# Patient Record
Sex: MALE | Race: BLACK OR AFRICAN AMERICAN | Employment: UNEMPLOYED | URBAN - METROPOLITAN AREA
[De-identification: names, ages, dates, MRNs, and addresses within clinical notes are randomized per-mention and may not be internally consistent; named-entity substitution may affect disease eponyms.]

---

## 2017-01-10 ENCOUNTER — OFFICE VISIT (OUTPATIENT)
Dept: ORTHOPEDIC SURGERY | Age: 47
End: 2017-01-10

## 2017-01-10 VITALS
HEART RATE: 75 BPM | HEIGHT: 69 IN | DIASTOLIC BLOOD PRESSURE: 95 MMHG | TEMPERATURE: 98.2 F | OXYGEN SATURATION: 94 % | WEIGHT: 223 LBS | SYSTOLIC BLOOD PRESSURE: 147 MMHG | BODY MASS INDEX: 33.03 KG/M2 | RESPIRATION RATE: 16 BRPM

## 2017-01-10 DIAGNOSIS — M25.669 DECREASED RANGE OF MOTION (ROM) OF KNEE: ICD-10-CM

## 2017-01-10 DIAGNOSIS — M17.32 POST-TRAUMATIC OSTEOARTHRITIS OF LEFT KNEE: Primary | ICD-10-CM

## 2017-01-10 DIAGNOSIS — S83.411A TEAR OF MCL (MEDIAL COLLATERAL LIGAMENT) OF KNEE, RIGHT, INITIAL ENCOUNTER: ICD-10-CM

## 2017-01-10 RX ORDER — LIDOCAINE 50 MG/G
OINTMENT TOPICAL
COMMUNITY
End: 2017-03-08

## 2017-01-10 RX ORDER — METFORMIN HYDROCHLORIDE 500 MG/1
500 TABLET ORAL
COMMUNITY
Start: 2016-10-12 | End: 2017-03-08

## 2017-01-10 RX ORDER — LISINOPRIL 20 MG/1
TABLET ORAL
Refills: 10 | COMMUNITY
Start: 2016-12-28 | End: 2018-07-09 | Stop reason: SDUPTHER

## 2017-01-10 RX ORDER — METFORMIN HYDROCHLORIDE EXTENDED-RELEASE TABLETS 1000 MG/1
TABLET, FILM COATED, EXTENDED RELEASE ORAL
Refills: 4 | COMMUNITY
Start: 2016-12-27 | End: 2017-03-08

## 2017-01-10 RX ORDER — ASPIRIN 325 MG
325 TABLET ORAL
COMMUNITY
End: 2017-03-08

## 2017-01-10 RX ORDER — FLUTICASONE PROPIONATE AND SALMETEROL 500; 50 UG/1; UG/1
1 POWDER RESPIRATORY (INHALATION)
COMMUNITY
End: 2017-03-08

## 2017-01-10 RX ORDER — NITROGLYCERIN 0.4 MG/1
0.4 TABLET SUBLINGUAL
COMMUNITY
End: 2017-03-08

## 2017-01-10 RX ORDER — OXYCODONE AND ACETAMINOPHEN 5; 325 MG/1; MG/1
1 TABLET ORAL
Qty: 90 TAB | Refills: 0 | Status: SHIPPED | OUTPATIENT
Start: 2017-01-10 | End: 2017-02-14 | Stop reason: SDUPTHER

## 2017-01-10 RX ORDER — LISINOPRIL 5 MG/1
5 TABLET ORAL
COMMUNITY
Start: 2014-10-22 | End: 2017-03-08

## 2017-01-10 RX ORDER — DULOXETIN HYDROCHLORIDE 30 MG/1
CAPSULE, DELAYED RELEASE ORAL
Refills: 0 | COMMUNITY
Start: 2016-12-27 | End: 2017-07-25 | Stop reason: SDUPTHER

## 2017-01-10 RX ORDER — METOPROLOL SUCCINATE 25 MG/1
25 TABLET, EXTENDED RELEASE ORAL
COMMUNITY
Start: 2016-10-12 | End: 2017-03-08

## 2017-01-10 RX ORDER — PRAVASTATIN SODIUM 20 MG/1
20 TABLET ORAL
COMMUNITY
End: 2017-03-08

## 2017-01-10 RX ORDER — LORATADINE AND PSEUDOEPHEDRINE 10; 240 MG/1; MG/1
TABLET, EXTENDED RELEASE ORAL
COMMUNITY
End: 2018-10-25

## 2017-01-10 RX ORDER — ALBUTEROL SULFATE 90 UG/1
1-2 AEROSOL, METERED RESPIRATORY (INHALATION)
COMMUNITY
Start: 2016-10-12 | End: 2018-10-25 | Stop reason: SDUPTHER

## 2017-01-10 RX ORDER — OMEPRAZOLE 20 MG/1
20 CAPSULE, DELAYED RELEASE ORAL
COMMUNITY
End: 2017-03-08

## 2017-01-10 RX ORDER — ASPIRIN 325 MG
500 TABLET, DELAYED RELEASE (ENTERIC COATED) ORAL
COMMUNITY
End: 2017-03-08

## 2017-01-10 RX ORDER — NAPROXEN 500 MG/1
500 TABLET ORAL
COMMUNITY
Start: 2016-12-03 | End: 2017-03-08

## 2017-01-10 RX ORDER — OMEPRAZOLE 40 MG/1
CAPSULE, DELAYED RELEASE ORAL
Refills: 10 | COMMUNITY
Start: 2016-12-28 | End: 2018-11-23 | Stop reason: SDUPTHER

## 2017-01-10 RX ORDER — MONTELUKAST SODIUM 10 MG/1
10 TABLET ORAL
COMMUNITY
End: 2017-03-08

## 2017-01-10 RX ORDER — LIDOCAINE 50 MG/G
OINTMENT TOPICAL
Refills: 3 | COMMUNITY
Start: 2016-12-27 | End: 2020-10-06

## 2017-01-10 NOTE — PROGRESS NOTES
.1. Have you been to the ER, urgent care clinic since your last visit? Hospitalized since your last visit? No    2. Have you seen or consulted any other health care providers outside of the [address] [place] [address] since your last visit? Include any pap smears or colon screening.  No

## 2017-01-10 NOTE — PROGRESS NOTES
HISTORY OF PRESENT ILLNESS    Stephanie Amador is a 51y.o. year old male comes in today to be evaluated and treated for: follow up knee pain    Since last appt has been having mechanical Sx right knee and to PT once as he is waiting on evening appt but is doing HEP on own at home. Injection right knee didn't seem to help as he still has pain that side. Was having issues with ROM after surgery prior but still having issues as no consistent PT. Social History     Social History    Marital status: SINGLE     Spouse name: N/A    Number of children: N/A    Years of education: N/A     Social History Main Topics    Smoking status: Former Smoker     Packs/day: 0.25    Smokeless tobacco: None    Alcohol use No      Comment: \"a little\"    Drug use: No    Sexual activity: Yes     Partners: Female     Other Topics Concern    None     Social History Narrative     Current Outpatient Prescriptions   Medication Sig Dispense Refill    lidocaine (XYLOCAINE) 5 % ointment APPLY THIN FILM RUB EXTERNALLY TWICE DAILY IN THE MORNING AND EVENING  3    lisinopril (PRINIVIL, ZESTRIL) 20 mg tablet TAKE 1 TABLET BY MOUTH EVERY DAY AS DIRECTED  10    albuterol (PROVENTIL HFA, VENTOLIN HFA, PROAIR HFA) 90 mcg/actuation inhaler 1-2 Puffs.  naproxen (NAPROSYN) 500 mg tablet 500 mg.  pravastatin (PRAVACHOL) 20 mg tablet 20 mg.      tiotropium (SPIRIVA) 18 mcg inhalation capsule 18 mcg.  omega 3-dha-epa-fish oil (FISH OIL) 60- mg cap Take 500 mg by mouth daily.  tiotropium (SPIRIVA) 18 mcg inhalation capsule Take 1 Cap by inhalation daily.  cyclobenzaprine (FLEXERIL) 10 mg tablet Cyclobenzaprine HCl - 10 MG Oral Tablet  take 1 tablet by mouth every 8 hours if needed for UP TO 5 DAYS   Quantity: 12; Refills: 0     Started 22-Nov-2016  Active      ASPIRIN PO Take 81 mg by mouth.  metoprolol succinate (TOPROL-XL) 25 mg XL tablet Take 25 mg by mouth daily.       metFORMIN (GLUCOPHAGE) 1,000 mg tablet Take 1,000 mg by mouth two (2) times a day.  omeprazole (PRILOSEC) 20 mg capsule Take 20 mg by mouth daily.  montelukast (SINGULAIR) 10 mg tablet Take 10 mg by mouth daily.  loratadine (CLARITIN) 10 mg tablet Take 10 mg by mouth daily.  fluticasone-salmeterol (ADVAIR DISKUS) 500-50 mcg/dose diskus inhaler Take 1 Puff by inhalation every twelve (12) hours.  albuterol (VENTOLIN HFA) 90 mcg/actuation inhaler Take 1 Puff by inhalation every six (6) hours as needed.  nitroglycerin (NITROSTAT) 0.4 mg SL tablet 0.4 mg by SubLINGual route every five (5) minutes as needed for Chest Pain.  DULoxetine (CYMBALTA) 30 mg capsule TAKE 1 CAPSULE BY MOUTH EVERY MORNING  0    metFORMIN ER 1,000 mg tr24 TAKE 1 TABLET BY MOUTH 2 TIMES A DAY  4    omeprazole (PRILOSEC) 40 mg capsule TAKE 1 CAPSULE BY MOUTH 2 TIMES A DAY  10    aspirin (ASPIRIN) 325 mg tablet 325 mg.      omega 3-dha-epa-fish oil (FISH OIL) 60- mg cap 500 mg.      fluticasone-salmeterol (ADVAIR) 500-50 mcg/dose diskus inhaler 1 Puff.  loratadine-pseudoephedrine (CLARITIN-D 24-HOUR)  mg per tablet Take 1 Tab by Mouth Once a Day.  lidocaine (XYLOCAINE) 5 % ointment Use 1 Application to affected area Take As Needed.  lisinopril (PRINIVIL, ZESTRIL) 5 mg tablet 5 mg.  metFORMIN (GLUCOPHAGE) 500 mg tablet 500 mg.      metoprolol succinate (TOPROL-XL) 25 mg XL tablet 25 mg.      montelukast (SINGULAIR) 10 mg tablet 10 mg.      nitroglycerin (NITROSTAT) 0.4 mg SL tablet 0.4 mg.      omeprazole (PRILOSEC) 20 mg capsule 20 mg.      oxyCODONE-acetaminophen (PERCOCET) 5-325 mg per tablet Take 1 Tab by mouth every eight (8) hours as needed for Pain. Max Daily Amount: 3 Tabs. 90 Tab 0    pravastatin (PRAVACHOL) 20 mg tablet Take 20 mg by mouth nightly.       docusate sodium (COLACE) 100 mg capsule Take one po daily prn constipation 30 Cap 3    lisinopril (PRINIVIL, ZESTRIL) 10 mg tablet Take 20 mg by mouth daily. Past Medical History   Diagnosis Date    Arthritis      left knee    Asthma     CAD (coronary artery disease)      heart attack 6/2012, Being followed by Dr. Greyson Crenshaw Depression     Diabetes ([facility] [address]     GERD (gastroesophageal reflux disease)     Hypertension     Psychiatric disorder      depression and schziophenia    Schizophrenia ([facility] [address]      Will be seeing [facility]    Unspecified sleep apnea      cpap machine,Will be seeing neurologist     Family History   Problem Relation Age of Onset    Anemia Mother     Cancer Father      prostate    Cancer Brother     Diabetes Brother      ROS:  Some swell, bruise, no numb, tingle, swell    Objective:  Visit Vitals    BP (!) 147/95 (BP 1 Location: Left arm, BP Patient Position: Sitting)    Pulse 75    Temp 98.2 °F (36.8 °C) (Oral)    Resp 16    Ht 5' 9\" (1.753 m)    Wt 223 lb (101.2 kg)    SpO2 94%    BMI 32.93 kg/m2     GEN: Appears stated age in NAD. HEAD: Normocephalic, atraumatic. NEURO: Sensation intact light touch B/L lower extremities. MS: LE Strength +5/5 bilateral .   right Knee:  2+ Effusion . Lachman negative. Valgus positive B/L. Varus negative. positive joint line tenderness medial, posterior. Ashok positive. Posterior drawer positive. Noble compression test negative. Patellar apprehension negative. Patellar facet positive tender to palpation medial no crepitance right. Loss ROM left knee to 70 degrees but I can get to 90 degrees with gentle pressure. EXT: no clubbing/cyanosis. no edema. SKIN: Warm/dry without rash. Assessment/Plan:     ICD-10-CM ICD-9-CM    1. Post-traumatic osteoarthritis of left knee M17.32 715.26 oxyCODONE-acetaminophen (PERCOCET) 5-325 mg per tablet   2. Tear of MCL (medial collateral ligament) of knee, right, initial encounter S83.411A 844.1 oxyCODONE-acetaminophen (PERCOCET) 5-325 mg per tablet   3.  Decreased range of motion (ROM) of knee M25.669 719.56        Patient verbalizes understanding of evaluation and plan. Will await MRI and refill percocet for pain control and RTC after MRI for results. Continue PT.  free of abhorrences.

## 2017-01-10 NOTE — PATIENT INSTRUCTIONS
Magnetic Resonance Imaging (MRI): About This Test  What is it? Magnetic resonance imaging (MRI) is a test that uses a magnetic field and pulses of radio wave energy to make pictures of organs and structures inside the body. When you have an MRI, you lie on a table and your body is moved into the MRI machine, where an image is taken of the area of the body being studied. Why is this test done? You may have an MRI for many reasons. This test can find problems such as tumors, bleeding, injury, blood vessel disease, and infection. An MRI also may provide more information about a problem seen on an X-ray, ultrasound scan, CT scan, or nuclear medicine exam.  How can you prepare for the test?  Talk to your doctor about all your health conditions before the test. For example, tell your doctor if:  · You are allergic to any medicines. · You are or might be pregnant. · You have a pacemaker, an artificial limb, any metal pins or metal parts in your body, metal heart valves, metal clips in your brain, metal implants in your ears, or any other implanted or prosthetic medical device. · You have an intrauterine device (IUD) in place. · You get nervous in confined spaces. You may need medicine to help you relax. · You wear any patches that contain medicine. · You have kidney disease. What happens before the test?  · You will remove all metal objects from your body. These include hearing aids, dentures, jewelry, watches, and hairpins. · You will take off all or most of your clothes and then change into a gown. · If you do leave some clothes on, make sure you take everything out of your pockets. · You may have contrast materials (dye) put into your arm through a tube called an IV. Contrast material helps doctors see specific organs, blood vessels, and most tumors. What happens during the test?  · You will lie on your back on a table that is part of the MRI scanner.   · The table will slide into the space that contains the magnet. · Inside the scanner you will hear a fan and feel air moving. You may hear tapping, thumping, or snapping noises. You may be given earplugs or headphones to reduce the noise. · You will be asked to hold still during the scan. You may be asked to hold your breath for short periods. · You may be alone in the scanning room, but a technologist will be watching you through a window and talking with you during the test.  What else should you know about the test?  · An MRI does not hurt. · If a dye is used, you may feel a quick sting or pinch and some coolness when the IV is started. The dye may give you a metallic taste in your mouth. Some people feel sick to their stomach or get a headache. · If you breastfeed and are concerned about whether the dye used in this test is safe, talk to your doctor. Most experts believe that very little dye passes into breast milk and even less is passed on to the baby. But if you prefer, you can store some of your breast milk ahead of time and use it for a day or two after the test.  · You may feel warmth in the area being examined. This is normal.  How long does the test take? · The test usually takes 30 to 60 minutes but can take as long as 2 hours. What happens after the test?  · You will probably be able to go home right away, depending on the reason for the test.  Follow-up care is a key part of your treatment and safety. Be sure to make and go to all appointments, and call your doctor if you are having problems. It's also a good idea to keep a list of the medicines you take. Ask your doctor when you can expect to have your test results. Where can you learn more? Go to http://carlos a-fernando.info/. Enter V016 in the search box to learn more about \"Magnetic Resonance Imaging (MRI): About This Test.\"  Current as of: February 19, 2016  Content Version: 11.1  © 2088-4690 [email or web address].  Care instructions adapted under license by [employer] (which disclaims liability or warranty for this information). If you have questions about a medical condition or this instruction, always ask your healthcare professional. [address] any warranty or liability for your use of this information.

## 2017-01-10 NOTE — MR AVS SNAPSHOT
Visit Information Date & Time Provider Department Dept. Phone Encounter #  
 1/10/2017  9:20 AM Triston Roach, [address] [facility] - [phone] [id number] Upcoming Health Maintenance Date Due  
 LIPID PANEL Q1 1970 FOOT EXAM Q1 2/5/1979 MICROALBUMIN Q1 2/5/1979 EYE EXAM RETINAL OR DILATED Q1 2/5/1979 HEMOGLOBIN A1C Q6M 2/5/2015 DTaP/Tdap/Td series (2 - Td) 12/12/2026 Allergies as of 1/10/2017  Review Complete On: 1/10/2017 By: Triston Roach DO Severity Noted Reaction Type Reactions Norco [Hydrocodone-acetaminophen] High 12/12/2016    Nausea and Vomiting Gi distress Shellfish Derived High 07/31/2014    Swelling, Angioedema Other reaction(s): anaphylaxis/angioedema [address] AND [facility] Current Immunizations  Never Reviewed Name Date Influenza Vaccine 10/27/2014, 9/1/2014 Pneumococcal Polysaccharide (PPSV-23) 11/4/2014 12:17 PM, 10/31/2013 Not reviewed this visit You Were Diagnosed With   
  
 Codes Comments Post-traumatic osteoarthritis of left knee    -  Primary ICD-10-CM: M17.32 
ICD-9-CM: 715.26 Tear of MCL (medial collateral ligament) of knee, right, initial encounter     ICD-10-CM: B32.058Q ICD-9-CM: 844.1 Decreased range of motion (ROM) of knee     ICD-10-CM: K67.767 ICD-9-CM: 719.56 Vitals BP Pulse Temp Resp Height(growth percentile) Weight(growth percentile) (!) 147/95 (BP 1 Location: Left arm, BP Patient Position: Sitting) 75 98.2 °F (36.8 °C) (Oral) 16 5' 9\" (1.753 m) 223 lb (101.2 kg) SpO2 BMI Smoking Status 94% 32.93 kg/m2 Former Smoker Vitals History BMI and BSA Data Body Mass Index Body Surface Area  
 32.93 kg/m 2 2.22 m 2 Preferred Pharmacy Pharmacy Name Phone [facility] [address] Your Updated Medication List  
  
   
 This list is accurate as of: 1/10/17 10:05 AM.  Always use your most recent med list.  
  
  
  
  
 * ADVAIR DISKUS 500-50 mcg/dose diskus inhaler Generic drug:  fluticasone-salmeterol Take 1 Puff by inhalation every twelve (12) hours. * fluticasone-salmeterol 500-50 mcg/dose diskus inhaler Commonly known as:  ADVAIR  
1 Puff. * ASPIRIN PO Take 81 mg by mouth. * aspirin 325 mg tablet Commonly known as:  ASPIRIN  
325 mg.  
  
 cyclobenzaprine 10 mg tablet Commonly known as:  FLEXERIL Cyclobenzaprine HCl - 10 MG Oral Tablet take 1 tablet by mouth every 8 hours if needed for UP TO 5 DAYS  Quantity: 12; Refills: 0   Started 22-Nov-2016 Active  
  
 docusate sodium 100 mg capsule Commonly known as:  Vikash Barefoot Take one po daily prn constipation DULoxetine 30 mg capsule Commonly known as:  CYMBALTA TAKE 1 CAPSULE BY MOUTH EVERY MORNING  
  
 * FISH OIL 60- mg Cap Generic drug:  omega 3-dha-epa-fish oil Take 500 mg by mouth daily. * FISH OIL 60- mg Cap Generic drug:  omega 3-dha-epa-fish oil 500 mg.  
  
 * lidocaine 5 % ointment Commonly known as:  XYLOCAINE Use 1 Application to affected area Take As Needed. * lidocaine 5 % ointment Commonly known as:  XYLOCAINE  
APPLY THIN FILM RUB EXTERNALLY TWICE DAILY IN THE MORNING AND EVENING  
  
 * lisinopril 10 mg tablet Commonly known as:  Leticia Primmer Take 20 mg by mouth daily. * lisinopril 5 mg tablet Commonly known as:  PRINIVIL, ZESTRIL  
5 mg.  
  
 * lisinopril 20 mg tablet Commonly known as:  PRINIVIL, ZESTRIL  
TAKE 1 TABLET BY MOUTH EVERY DAY AS DIRECTED  
  
 loratadine 10 mg tablet Commonly known as:  Shira Nay Take 10 mg by mouth daily. loratadine-pseudoephedrine  mg per tablet Commonly known as:  CLARITIN-D 24-hour Take 1 Tab by Mouth Once a Day. * metFORMIN 1,000 mg tablet Commonly known as:  GLUCOPHAGE  
 Take 1,000 mg by mouth two (2) times a day. * GLUCOPHAGE 500 mg tablet Generic drug:  metFORMIN  
500 mg.  
  
 * metFORMIN ER 1,000 mg Tr24 TAKE 1 TABLET BY MOUTH 2 TIMES A DAY  
  
 * metoprolol succinate 25 mg XL tablet Commonly known as:  TOPROL-XL Take 25 mg by mouth daily. * metoprolol succinate 25 mg XL tablet Commonly known as:  TOPROL-XL 25 mg.  
  
 * montelukast 10 mg tablet Commonly known as:  SINGULAIR Take 10 mg by mouth daily. * montelukast 10 mg tablet Commonly known as:  SINGULAIR  
10 mg. NAPROSYN 500 mg tablet Generic drug:  naproxen 500 mg.  
  
 * NITROSTAT 0.4 mg SL tablet Generic drug:  nitroglycerin 0.4 mg by SubLINGual route every five (5) minutes as needed for Chest Pain. * nitroglycerin 0.4 mg SL tablet Commonly known as:  NITROSTAT  
0.4 mg.  
  
 * omeprazole 20 mg capsule Commonly known as:  PRILOSEC Take 20 mg by mouth daily. * omeprazole 20 mg capsule Commonly known as:  PRILOSEC  
20 mg.  
  
 * omeprazole 40 mg capsule Commonly known as:  PRILOSEC  
TAKE 1 CAPSULE BY MOUTH 2 TIMES A DAY  
  
 oxyCODONE-acetaminophen 5-325 mg per tablet Commonly known as:  PERCOCET Take 1 Tab by mouth every eight (8) hours as needed for Pain. Max Daily Amount: 3 Tabs. * pravastatin 20 mg tablet Commonly known as:  PRAVACHOL Take 20 mg by mouth nightly. * pravastatin 20 mg tablet Commonly known as:  PRAVACHOL  
20 mg.  
  
 * tiotropium 18 mcg inhalation capsule Commonly known as:  Karen Standing Take 1 Cap by inhalation daily. * tiotropium 18 mcg inhalation capsule Commonly known as:  Karen Standing 18 mcg. * VENTOLIN HFA 90 mcg/actuation inhaler Generic drug:  albuterol Take 1 Puff by inhalation every six (6) hours as needed. * albuterol 90 mcg/actuation inhaler Commonly known as:  PROVENTIL HFA, VENTOLIN HFA, PROAIR HFA  
1-2 Puffs. * Notice: This list has 29 medication(s) that are the same as other medications prescribed for you. Read the directions carefully, and ask your doctor or other care provider to review them with you. Prescriptions Printed Refills  
 oxyCODONE-acetaminophen (PERCOCET) 5-325 mg per tablet 0 Sig: Take 1 Tab by mouth every eight (8) hours as needed for Pain. Max Daily Amount: 3 Tabs. Class: Print Route: Oral  
  
Patient Instructions Magnetic Resonance Imaging (MRI): About This Test 
What is it? Magnetic resonance imaging (MRI) is a test that uses a magnetic field and pulses of radio wave energy to make pictures of organs and structures inside the body. When you have an MRI, you lie on a table and your body is moved into the MRI machine, where an image is taken of the area of the body being studied. Why is this test done? You may have an MRI for many reasons. This test can find problems such as tumors, bleeding, injury, blood vessel disease, and infection. An MRI also may provide more information about a problem seen on an X-ray, ultrasound scan, CT scan, or nuclear medicine exam. 
How can you prepare for the test? 
Talk to your doctor about all your health conditions before the test. For example, tell your doctor if: 
· You are allergic to any medicines. · You are or might be pregnant. · You have a pacemaker, an artificial limb, any metal pins or metal parts in your body, metal heart valves, metal clips in your brain, metal implants in your ears, or any other implanted or prosthetic medical device. · You have an intrauterine device (IUD) in place. · You get nervous in confined spaces. You may need medicine to help you relax. · You wear any patches that contain medicine. · You have kidney disease. What happens before the test? 
· You will remove all metal objects from your body. These include hearing aids, dentures, jewelry, watches, and hairpins. · You will take off all or most of your clothes and then change into a gown. · If you do leave some clothes on, make sure you take everything out of your pockets. · You may have contrast materials (dye) put into your arm through a tube called an IV. Contrast material helps doctors see specific organs, blood vessels, and most tumors. What happens during the test? 
· You will lie on your back on a table that is part of the MRI scanner. · The table will slide into the space that contains the magnet. · Inside the scanner you will hear a fan and feel air moving. You may hear tapping, thumping, or snapping noises. You may be given earplugs or headphones to reduce the noise. · You will be asked to hold still during the scan. You may be asked to hold your breath for short periods. · You may be alone in the scanning room, but a technologist will be watching you through a window and talking with you during the test. 
What else should you know about the test? 
· An MRI does not hurt. · If a dye is used, you may feel a quick sting or pinch and some coolness when the IV is started. The dye may give you a metallic taste in your mouth. Some people feel sick to their stomach or get a headache. · If you breastfeed and are concerned about whether the dye used in this test is safe, talk to your doctor. Most experts believe that very little dye passes into breast milk and even less is passed on to the baby. But if you prefer, you can store some of your breast milk ahead of time and use it for a day or two after the test. 
· You may feel warmth in the area being examined. This is normal. 
How long does the test take? · The test usually takes 30 to 60 minutes but can take as long as 2 hours. What happens after the test? 
· You will probably be able to go home right away, depending on the reason for the test. 
Follow-up care is a key part of your treatment and safety.  Be sure to make and go to all appointments, and call your doctor if you are having problems. It's also a good idea to keep a list of the medicines you take. Ask your doctor when you can expect to have your test results. Where can you learn more? Go to http://carlos a-fernando.info/. Enter V016 in the search box to learn more about \"Magnetic Resonance Imaging (MRI): About This Test.\" Current as of: February 19, 2016 Content Version: 11.1 © 8824-5198 [employer]. Care instructions adapted under license by [employer] (which disclaims liability or warranty for this information). If you have questions about a medical condition or this instruction, always ask your healthcare professional. [address] any warranty or liability for your use of this information. Introducing [facility]! Erica Andrade introduces [employer] patient portal. Now you can access parts of your medical record, email your doctor's office, and request medication refills online. 1. In your internet browser, go to [email or web address] 2. Click on the First Time User? Click Here link in the Sign In box. You will see the New Member Sign Up page. 3. Enter your [employer] Access Code exactly as it appears below. You will not need to use this code after youve completed the sign-up process. If you do not sign up before the expiration date, you must request a new code. · [employer] Access Code: [email or web address] Expires: 3/12/2017  3:38 PM 
 
4. Enter the last four digits of your Social Security Number (xxxx) and Date of Birth (mm/dd/yyyy) as indicated and click Submit. You will be taken to the next sign-up page. 5. Create a thinkingphonest ID. This will be your [employer] login ID and cannot be changed, so think of one that is secure and easy to remember. 6. Create a thinkingphonest password. You can change your password at any time. 7. Enter your Password Reset Question and Answer. This can be used at a later time if you forget your password. 8. Enter your e-mail address. You will receive e-mail notification when new information is available in [address] 9. Click Sign Up. You can now view and download portions of your medical record. 10. Click the Download Summary menu link to download a portable copy of your medical information. If you have questions, please visit the Frequently Asked Questions section of the [employer] website. Remember, [employer] is NOT to be used for urgent needs. For medical emergencies, dial 911. Now available from your iPhone and Android! Please provide this summary of care documentation to your next provider. Your primary care clinician is listed as oTny Howard. If you have any questions after today's visit, please call [phone].

## 2017-01-11 ENCOUNTER — TELEPHONE (OUTPATIENT)
Dept: ORTHOPEDIC SURGERY | Age: 47
End: 2017-01-11

## 2017-01-11 NOTE — TELEPHONE ENCOUNTER
Pt called and states that he is in need of Pre-authorization to obtain he recent prescription of Percocet. Please call pt with further information at [phone].

## 2017-01-12 DIAGNOSIS — E78.5 DYSLIPIDEMIA: ICD-10-CM

## 2017-01-12 DIAGNOSIS — I10 BENIGN HYPERTENSION WITHOUT CHF: ICD-10-CM

## 2017-01-12 DIAGNOSIS — Z13.21 ENCOUNTER FOR VITAMIN DEFICIENCY SCREENING: ICD-10-CM

## 2017-01-12 DIAGNOSIS — K59.00 CONSTIPATION, UNSPECIFIED CONSTIPATION TYPE: ICD-10-CM

## 2017-01-12 DIAGNOSIS — E11.9 DIABETES MELLITUS, STABLE (HCC): ICD-10-CM

## 2017-01-12 NOTE — TELEPHONE ENCOUNTER
I have placed the prior authorization for patient's percocet on provider desk to be signed and then faxed to insurance company

## 2017-01-12 NOTE — TELEPHONE ENCOUNTER
Called and left patient a message that with his insurance his percocet will not be approved and per Dr. Arlette Solomon he can then take Aleve or Ibuprofen. If he had any question to call me back.

## 2017-01-23 ENCOUNTER — OFFICE VISIT (OUTPATIENT)
Dept: SURGERY | Age: 47
End: 2017-01-23

## 2017-01-23 VITALS
RESPIRATION RATE: 20 BRPM | DIASTOLIC BLOOD PRESSURE: 80 MMHG | HEIGHT: 69 IN | BODY MASS INDEX: 33.53 KG/M2 | TEMPERATURE: 97.6 F | OXYGEN SATURATION: 95 % | HEART RATE: 76 BPM | SYSTOLIC BLOOD PRESSURE: 150 MMHG | WEIGHT: 226.4 LBS

## 2017-01-23 DIAGNOSIS — R19.01 ABDOMINAL MASS, RIGHT UPPER QUADRANT: Primary | ICD-10-CM

## 2017-01-23 DIAGNOSIS — R10.11 RIGHT UPPER QUADRANT ABDOMINAL PAIN: ICD-10-CM

## 2017-01-23 NOTE — PROGRESS NOTES
General Surgery Consult    Dilma Crespo  Admit date: (Not on file)    MRN: [id number]     : 1970     Age: 51 y.o. Attending Physician: Elizabeth Martinez MD, [facility]      History of Present Illness:      Dilma Crespo is a 51 y.o. male who presented with was referred for the possibility of an incisional hernia. The patient had umbilical hernia about 2-3 years ago. And for the past one to 2 years been having constipation. He also he has been having abdominal pain on the right side of the hernia repair. He was noticed to have a bulge just on the right side of the hernia. He denies any nausea or vomiting. Patient has significant medical history including schizophrenia and DM and it seems history of coronary artery disease. He said that in his previous hernia surgery a mesh was placed.     Patient Active Problem List    Diagnosis Date Noted    [facility]ia ([facility]     Unspecified sleep apnea     Psychiatric disorder     Hypertension     GERD (gastroesophageal reflux disease)     Diabetes ([facility]     Depression     CAD (coronary artery disease)     Asthma     Arthritis     Encounter for long-term (current) use of other medications 2015    Left knee pain 2015    Chronic pain syndrome 2015    History of total knee arthroplasty 2015    S/P surgical manipulation of knee joint 2015    History of anxiety 2015    DJD (degenerative joint disease) of knee 2014     Past Medical History   Diagnosis Date    Arthritis      left knee    Asthma     CAD (coronary artery disease)      heart attack 2012, Being followed by Dr. Tabitha Cedeño Depression     Diabetes ([facility]     GERD (gastroesophageal reflux disease)     Hypertension     Psychiatric disorder      depression and schziophenia    Schizophrenia ([facility]      Will be seeing [employer]    Unspecified sleep apnea      cpap machine,Will be seeing neurologist      Past Surgical History Procedure Laterality Date    Hx orthopaedic       L knee 1987, 1996    Pr abdomen surgery proc unlisted      Hx heart catheterization       cardiac cath     Hx hernia repair      Hx other surgical       eye sx at 2 yrs old    Hx knee replacement       left TKR      Social History   Substance Use Topics    Smoking status: Former Smoker     Packs/day: 0.25    Smokeless tobacco: Not on file    Alcohol use No      Comment: \"a little\"      History   Smoking Status    Former Smoker    Packs/day: 0.25   Smokeless Tobacco    Not on file     Family History   Problem Relation Age of Onset    Anemia Mother     Cancer Father      prostate    Cancer Brother     Diabetes Brother       Current Outpatient Prescriptions   Medication Sig    lisinopril (PRINIVIL, ZESTRIL) 20 mg tablet TAKE 1 TABLET BY MOUTH EVERY DAY AS DIRECTED    metFORMIN ER 1,000 mg tr24 TAKE 1 TABLET BY MOUTH 2 TIMES A DAY    omeprazole (PRILOSEC) 40 mg capsule TAKE 1 CAPSULE BY MOUTH 2 TIMES A DAY    aspirin (ASPIRIN) 325 mg tablet 325 mg.    albuterol (PROVENTIL HFA, VENTOLIN HFA, PROAIR HFA) 90 mcg/actuation inhaler 1-2 Puffs.  naproxen (NAPROSYN) 500 mg tablet 500 mg.    omega 3-dha-epa-fish oil (FISH OIL) 60- mg cap 500 mg.    fluticasone-salmeterol (ADVAIR) 500-50 mcg/dose diskus inhaler 1 Puff.  loratadine-pseudoephedrine (CLARITIN-D 24-HOUR)  mg per tablet Take 1 Tab by Mouth Once a Day.  lisinopril (PRINIVIL, ZESTRIL) 5 mg tablet 5 mg.  metFORMIN (GLUCOPHAGE) 500 mg tablet 500 mg.    metoprolol succinate (TOPROL-XL) 25 mg XL tablet 25 mg.    montelukast (SINGULAIR) 10 mg tablet 10 mg.    nitroglycerin (NITROSTAT) 0.4 mg SL tablet 0.4 mg.    omeprazole (PRILOSEC) 20 mg capsule 20 mg.  pravastatin (PRAVACHOL) 20 mg tablet 20 mg.    tiotropium (SPIRIVA) 18 mcg inhalation capsule 18 mcg.  omega 3-dha-epa-fish oil (FISH OIL) 60- mg cap Take 500 mg by mouth daily.     pravastatin (PRAVACHOL) 20 mg tablet Take 20 mg by mouth nightly.  tiotropium (SPIRIVA) 18 mcg inhalation capsule Take 1 Cap by inhalation daily.  ASPIRIN PO Take 81 mg by mouth.  docusate sodium (COLACE) 100 mg capsule Take one po daily prn constipation    metoprolol succinate (TOPROL-XL) 25 mg XL tablet Take 25 mg by mouth daily.  metFORMIN (GLUCOPHAGE) 1,000 mg tablet Take 1,000 mg by mouth two (2) times a day.  omeprazole (PRILOSEC) 20 mg capsule Take 20 mg by mouth daily.  montelukast (SINGULAIR) 10 mg tablet Take 10 mg by mouth daily.  loratadine (CLARITIN) 10 mg tablet Take 10 mg by mouth daily.  lisinopril (PRINIVIL, ZESTRIL) 10 mg tablet Take 20 mg by mouth daily.  fluticasone-salmeterol (ADVAIR DISKUS) 500-50 mcg/dose diskus inhaler Take 1 Puff by inhalation every twelve (12) hours.  albuterol (VENTOLIN HFA) 90 mcg/actuation inhaler Take 1 Puff by inhalation every six (6) hours as needed.  nitroglycerin (NITROSTAT) 0.4 mg SL tablet 0.4 mg by SubLINGual route every five (5) minutes as needed for Chest Pain.  DULoxetine (CYMBALTA) 30 mg capsule TAKE 1 CAPSULE BY MOUTH EVERY MORNING    lidocaine (XYLOCAINE) 5 % ointment APPLY THIN FILM RUB EXTERNALLY TWICE DAILY IN THE MORNING AND EVENING    lidocaine (XYLOCAINE) 5 % ointment Use 1 Application to affected area Take As Needed.  oxyCODONE-acetaminophen (PERCOCET) 5-325 mg per tablet Take 1 Tab by mouth every eight (8) hours as needed for Pain. Max Daily Amount: 3 Tabs.  cyclobenzaprine (FLEXERIL) 10 mg tablet Cyclobenzaprine HCl - 10 MG Oral Tablet  take 1 tablet by mouth every 8 hours if needed for UP TO 5 DAYS   Quantity: 12; Refills: 0     Started 22-Nov-2016  Active     No current facility-administered medications for this visit.        Allergies   Allergen Reactions    Norco [Hydrocodone-Acetaminophen] Nausea and Vomiting     Gi distress    Shellfish Derived Swelling and Angioedema     Other reaction(s): anaphylaxis/angioedema  [place] AND CRAB        Review of Systems:  Constitutional: negative for fevers and chills  Eyes: negative  Ears, Nose, Mouth, Throat, and Face: negative  Respiratory: negative for cough  Cardiovascular: negative for chest pain  Gastrointestinal: positive for constipation and abdominal pain, negative for nausea and vomiting  Genitourinary:negative  Integument/Breast: negative  Hematologic/Lymphatic: negative  Musculoskeletal:negative  Neurological: negative  Behavioral/Psychiatric: negative  Endocrine: negative  Allergic/Immunologic: negative    Objective:     Visit Vitals    /80 (BP 1 Location: Right arm, BP Patient Position: Sitting)    Pulse 76    Temp 97.6 °F (36.4 °C) (Oral)    Resp 20    Ht 5' 9\" (1.753 m)    Wt 102.7 kg (226 lb 6.4 oz)    SpO2 95%    BMI 33.43 kg/m2       Physical Exam:      General:  in no apparent distress   Eyes:  conjunctivae and sclerae normal, pupils equal, round, reactive to light   Throat & Neck: no erythema or exudates noted and neck supple and symmetrical; no palpable masses   Lungs:   clear to auscultation bilaterally   Heart:  Regular rate and rhythm   Abdomen:   rounded, soft, nontender, nondistended, no organomegaly. A ball which can be noticed just to the right side of the umbilicus that is non tender. I could not appreciate a clear hernia defect though there may be one just lateral to the previous hernia repair.     Extremities: extremities normal, atraumatic, no cyanosis or edema   Skin: Normal.       Imaging and Lab Review:     CBC:   Lab Results   Component Value Date/Time    HGB 8.0 08/07/2014 05:38 AM    HCT 25.3 08/07/2014 05:38 AM     BMP:   Lab Results   Component Value Date/Time    Glucose 100 08/07/2014 05:38 AM    Sodium 140 08/07/2014 05:38 AM    Potassium 4.2 08/07/2014 05:38 AM    Chloride 104 08/07/2014 05:38 AM    CO2 29 08/07/2014 05:38 AM    BUN 10 08/07/2014 05:38 AM    Creatinine 1.27 08/07/2014 05:38 AM    Calcium 8.4 08/07/2014 05:38 AM     CMP:  Lab Results   Component Value Date/Time    Glucose 100 08/07/2014 05:38 AM    Sodium 140 08/07/2014 05:38 AM    Potassium 4.2 08/07/2014 05:38 AM    Chloride 104 08/07/2014 05:38 AM    CO2 29 08/07/2014 05:38 AM    BUN 10 08/07/2014 05:38 AM    Creatinine 1.27 08/07/2014 05:38 AM    Calcium 8.4 08/07/2014 05:38 AM    Anion gap 7 08/07/2014 05:38 AM    BUN/Creatinine ratio 8 08/07/2014 05:38 AM       No results found for this or any previous visit (from the past 24 hour(s)). images and reports reviewed    Assessment:   Brad Buenrostro is a 51 y.o. male is presenting with abdominal pain and a picture of possible recurrence abdomen for hernia. The patient has pain and bulge on the right side of the abdominal wall. Given the fact that I could not for sure establish a diagnosis of a ventral hernia and giving the fact that the patient has multiple medical co morbidities, I would like to get a CT scan for confirmation of the presence of a hernia.     Plan:     Get a CT scan of the abdomen and pelvis to rule out abdominal wall hernia  If positive I would bring the patient back to discuss hernia surgery     Please call me if you have any questions (cell phone: [phone])     Signed By: Varun Gordon MD     January 23, 2017

## 2017-01-23 NOTE — PATIENT INSTRUCTIONS
If you have any questions or concerns about today's appointment, the verbal and/or written instructions you were given for follow up care, please call our office at [phone]. [facility] - [address]    [phone] office  [phone]. bss

## 2017-01-23 NOTE — PROGRESS NOTES
Patient is a 51 y.o. male who presents today for an evaluation of a ventral hernia, which was discovered approximately three weeks ago by his PCP upon physical exam. Patient denies having any abdominal pain or N/V.

## 2017-01-23 NOTE — LETTER
1/23/2017 8:41 AM 
 
Patient:  Obed Lewis YOB: 1970 Date of Visit: 1/23/2017 Jose Nath MD 
[address] [place] [address] VIA In Basket Dear Jose Nath MD, Thank you for referring [facility]. Ariana [facility] and treatment. Below are the relevant portions of my assessment and plan of care. Thank you very much for your referral of Mr. Ariana Turner. If you have questions, please do not hesitate to call me. I look forward to following Mr. Rah Loredo along with you and will keep you updated as to his progress. Sincerely, Johnny Sandhu MD

## 2017-01-23 NOTE — MR AVS SNAPSHOT
Visit Information Date & Time Provider Department Dept. Phone Encounter #  
 1/23/2017  8:00 AM Johnny Sandhu MD [facility] [address] [facility] [phone] [id number] Upcoming Health Maintenance Date Due  
 LIPID PANEL Q1 1970 FOOT EXAM Q1 2/5/1979 MICROALBUMIN Q1 2/5/1979 EYE EXAM RETINAL OR DILATED Q1 2/5/1979 HEMOGLOBIN A1C Q6M 2/5/2015 DTaP/Tdap/Td series (2 - Td) 12/12/2026 Allergies as of 1/23/2017  Review Complete On: 1/23/2017 By: Ara Tejada Severity Noted Reaction Type Reactions Norco [Hydrocodone-acetaminophen] High 12/12/2016    Nausea and Vomiting Gi distress Shellfish Derived High 07/31/2014    Swelling, Angioedema Other reaction(s): anaphylaxis/angioedema [address] AND CRAB Current Immunizations  Never Reviewed Name Date Influenza Vaccine 10/27/2014, 9/1/2014 Pneumococcal Polysaccharide (PPSV-23) 11/4/2014 12:17 PM, 10/31/2013 Not reviewed this visit Vitals BP Pulse Temp Resp Height(growth percentile) Weight(growth percentile) 150/80 (BP 1 Location: Right arm, BP Patient Position: Sitting) 76 97.6 °F (36.4 °C) (Oral) 20 5' 9\" (1.753 m) 226 lb 6.4 oz (102.7 kg) SpO2 BMI Smoking Status 95% 33.43 kg/m2 Former Smoker BMI and BSA Data Body Mass Index Body Surface Area  
 33.43 kg/m 2 2.24 m 2 Preferred Pharmacy Pharmacy Name Phone [address] Your Updated Medication List  
  
   
This list is accurate as of: 1/23/17  8:54 AM.  Always use your most recent med list.  
  
  
  
  
 * ADVAIR DISKUS 500-50 mcg/dose diskus inhaler Generic drug:  fluticasone-salmeterol Take 1 Puff by inhalation every twelve (12) hours. * fluticasone-salmeterol 500-50 mcg/dose diskus inhaler Commonly known as:  ADVAIR  
1 Puff. * ASPIRIN PO Take 81 mg by mouth. * aspirin 325 mg tablet Commonly known as:  ASPIRIN  
325 mg.  
  
 cyclobenzaprine 10 mg tablet Commonly known as:  FLEXERIL Cyclobenzaprine HCl - 10 MG Oral Tablet take 1 tablet by mouth every 8 hours if needed for UP TO 5 DAYS  Quantity: 12; Refills: 0   Started 22-Nov-2016 Active  
  
 docusate sodium 100 mg capsule Commonly known as:  Ltanya Harshad Take one po daily prn constipation DULoxetine 30 mg capsule Commonly known as:  CYMBALTA TAKE 1 CAPSULE BY MOUTH EVERY MORNING  
  
 * FISH OIL 60- mg Cap Generic drug:  omega 3-dha-epa-fish oil Take 500 mg by mouth daily. * FISH OIL 60- mg Cap Generic drug:  omega 3-dha-epa-fish oil 500 mg.  
  
 * lidocaine 5 % ointment Commonly known as:  XYLOCAINE Use 1 Application to affected area Take As Needed. * lidocaine 5 % ointment Commonly known as:  XYLOCAINE  
APPLY THIN FILM RUB EXTERNALLY TWICE DAILY IN THE MORNING AND EVENING  
  
 * lisinopril 10 mg tablet Commonly known as:  Merilynn Yusef Take 20 mg by mouth daily. * lisinopril 5 mg tablet Commonly known as:  PRINIVIL, ZESTRIL  
5 mg.  
  
 * lisinopril 20 mg tablet Commonly known as:  PRINIVIL, ZESTRIL  
TAKE 1 TABLET BY MOUTH EVERY DAY AS DIRECTED  
  
 loratadine 10 mg tablet Commonly known as:  Vicgabriel Braxton Take 10 mg by mouth daily. loratadine-pseudoephedrine  mg per tablet Commonly known as:  CLARITIN-D 24-hour Take 1 Tab by Mouth Once a Day. * metFORMIN 1,000 mg tablet Commonly known as:  GLUCOPHAGE Take 1,000 mg by mouth two (2) times a day. * GLUCOPHAGE 500 mg tablet Generic drug:  metFORMIN  
500 mg.  
  
 * metFORMIN ER 1,000 mg Tr24 TAKE 1 TABLET BY MOUTH 2 TIMES A DAY  
  
 * metoprolol succinate 25 mg XL tablet Commonly known as:  TOPROL-XL Take 25 mg by mouth daily. * metoprolol succinate 25 mg XL tablet Commonly known as:  TOPROL-XL 25 mg.  
  
 * montelukast 10 mg tablet Commonly known as:  SINGULAIR Take 10 mg by mouth daily. * montelukast 10 mg tablet Commonly known as:  SINGULAIR  
10 mg. NAPROSYN 500 mg tablet Generic drug:  naproxen 500 mg.  
  
 * NITROSTAT 0.4 mg SL tablet Generic drug:  nitroglycerin 0.4 mg by SubLINGual route every five (5) minutes as needed for Chest Pain. * nitroglycerin 0.4 mg SL tablet Commonly known as:  NITROSTAT  
0.4 mg.  
  
 * omeprazole 20 mg capsule Commonly known as:  PRILOSEC Take 20 mg by mouth daily. * omeprazole 20 mg capsule Commonly known as:  PRILOSEC  
20 mg.  
  
 * omeprazole 40 mg capsule Commonly known as:  PRILOSEC  
TAKE 1 CAPSULE BY MOUTH 2 TIMES A DAY  
  
 oxyCODONE-acetaminophen 5-325 mg per tablet Commonly known as:  PERCOCET Take 1 Tab by mouth every eight (8) hours as needed for Pain. Max Daily Amount: 3 Tabs. * pravastatin 20 mg tablet Commonly known as:  PRAVACHOL Take 20 mg by mouth nightly. * pravastatin 20 mg tablet Commonly known as:  PRAVACHOL  
20 mg.  
  
 * tiotropium 18 mcg inhalation capsule Commonly known as:  Kelly Pandy Take 1 Cap by inhalation daily. * tiotropium 18 mcg inhalation capsule Commonly known as:  Kelly Pandy 18 mcg. * VENTOLIN HFA 90 mcg/actuation inhaler Generic drug:  albuterol Take 1 Puff by inhalation every six (6) hours as needed. * albuterol 90 mcg/actuation inhaler Commonly known as:  PROVENTIL HFA, VENTOLIN HFA, PROAIR HFA  
1-2 Puffs. * Notice: This list has 29 medication(s) that are the same as other medications prescribed for you. Read the directions carefully, and ask your doctor or other care provider to review them with you. Patient Instructions If you have any questions or concerns about today's appointment, the verbal and/or written instructions you were given for follow up care, please call our office at [phone]. [facility] [address] [facility], [address] 
 
[phone] office [phone]. bss Introducing [facility] [place] [facility]! Reymundo Almendarez introduces [employer] patient portal. Now you can access parts of your medical record, email your doctor's office, and request medication refills online. 1. In your internet browser, go to [email or web address] 2. Click on the First Time User? Click Here link in the Sign In box. You will see the New Member Sign Up page. 3. Enter your [employer] Access Code exactly as it appears below. You will not need to use this code after youve completed the sign-up process. If you do not sign up before the expiration date, you must request a new code. · [employer] Access Code: [email or web address] Expires: 3/12/2017  3:38 PM 
 
4. Enter the last four digits of your Social Security Number (xxxx) and Date of Birth (mm/dd/yyyy) as indicated and click Submit. You will be taken to the next sign-up page. 5. Create a [employer] ID. This will be your [employer] login ID and cannot be changed, so think of one that is secure and easy to remember. 6. Create a [employer] password. You can change your password at any time. 7. Enter your Password Reset Question and Answer. This can be used at a later time if you forget your password. 8. Enter your e-mail address. You will receive e-mail notification when new information is available in [address] 9. Click Sign Up. You can now view and download portions of your medical record. 10. Click the Download Summary menu link to download a portable copy of your medical information. If you have questions, please visit the Frequently Asked Questions section of the [employer] website. Remember, [employer] is NOT to be used for urgent needs. For medical emergencies, dial 911. Now available from your iPhone and Android! Please provide this summary of care documentation to your next provider. Your primary care clinician is listed as Tony Howard. If you have any questions after today's visit, please call [phone].

## 2017-01-26 ENCOUNTER — HOSPITAL ENCOUNTER (OUTPATIENT)
Dept: MRI IMAGING | Age: 47
Discharge: HOME | End: 2017-01-26
Attending: FAMILY MEDICINE
Payer: MEDICAID

## 2017-01-26 DIAGNOSIS — M25.561 MEDIAL JOINT LINE TENDERNESS OF KNEE, RIGHT: ICD-10-CM

## 2017-01-26 DIAGNOSIS — M25.461 KNEE EFFUSION, RIGHT: ICD-10-CM

## 2017-01-26 PROCEDURE — 73721 MRI JNT OF LWR EXTRE W/O DYE: CPT

## 2017-02-11 ENCOUNTER — HOSPITAL ENCOUNTER (EMERGENCY)
Age: 47
Discharge: HOME | End: 2017-02-11
Attending: EMERGENCY MEDICINE | Admitting: EMERGENCY MEDICINE
Payer: SELF-PAY

## 2017-02-11 ENCOUNTER — APPOINTMENT (OUTPATIENT)
Dept: GENERAL RADIOLOGY | Age: 47
End: 2017-02-11
Attending: EMERGENCY MEDICINE
Payer: SELF-PAY

## 2017-02-11 ENCOUNTER — APPOINTMENT (OUTPATIENT)
Dept: CT IMAGING | Age: 47
End: 2017-02-11
Attending: EMERGENCY MEDICINE
Payer: SELF-PAY

## 2017-02-11 VITALS
WEIGHT: 230 LBS | HEART RATE: 78 BPM | TEMPERATURE: 97.2 F | BODY MASS INDEX: 33.97 KG/M2 | RESPIRATION RATE: 18 BRPM | DIASTOLIC BLOOD PRESSURE: 93 MMHG | OXYGEN SATURATION: 94 % | SYSTOLIC BLOOD PRESSURE: 145 MMHG

## 2017-02-11 DIAGNOSIS — T40.601A NARCOTIC OVERDOSE, ACCIDENTAL OR UNINTENTIONAL, INITIAL ENCOUNTER (HCC): Primary | ICD-10-CM

## 2017-02-11 LAB
ALBUMIN SERPL BCP-MCNC: 3.5 G/DL (ref 3.4–5)
ALBUMIN/GLOB SERPL: 1.1 {RATIO} (ref 0.8–1.7)
ALP SERPL-CCNC: 96 U/L (ref 45–117)
ALT SERPL-CCNC: 78 U/L (ref 16–61)
AMPHET UR QL SCN: NEGATIVE
ANION GAP BLD CALC-SCNC: 13 MMOL/L (ref 3–18)
ANION GAP BLD CALC-SCNC: 21 MMOL/L (ref 10–20)
AST SERPL W P-5'-P-CCNC: 75 U/L (ref 15–37)
BARBITURATES UR QL SCN: NEGATIVE
BASOPHILS # BLD AUTO: 0 K/UL (ref 0–0.06)
BASOPHILS # BLD: 0 % (ref 0–2)
BENZODIAZ UR QL: NEGATIVE
BILIRUB SERPL-MCNC: 0.3 MG/DL (ref 0.2–1)
BUN BLD-MCNC: 10 MG/DL (ref 7–18)
BUN SERPL-MCNC: 11 MG/DL (ref 7–18)
BUN/CREAT SERPL: 7 (ref 12–20)
CA-I BLD-MCNC: 1.09 MMOL/L (ref 1.12–1.32)
CALCIUM SERPL-MCNC: 8.3 MG/DL (ref 8.5–10.1)
CANNABINOIDS UR QL SCN: NEGATIVE
CHLORIDE BLD-SCNC: 103 MMOL/L (ref 100–108)
CHLORIDE SERPL-SCNC: 106 MMOL/L (ref 100–108)
CO2 BLD-SCNC: 26 MMOL/L (ref 19–24)
CO2 SERPL-SCNC: 26 MMOL/L (ref 21–32)
COCAINE UR QL SCN: POSITIVE
CREAT SERPL-MCNC: 1.59 MG/DL (ref 0.6–1.3)
CREAT UR-MCNC: 1.3 MG/DL (ref 0.6–1.3)
DIFFERENTIAL METHOD BLD: ABNORMAL
EOSINOPHIL # BLD: 0 K/UL (ref 0–0.4)
EOSINOPHIL NFR BLD: 0 % (ref 0–5)
ERYTHROCYTE [DISTWIDTH] IN BLOOD BY AUTOMATED COUNT: 14.9 % (ref 11.6–14.5)
ETHANOL SERPL-MCNC: 3 MG/DL (ref 0–3)
GLOBULIN SER CALC-MCNC: 3.3 G/DL (ref 2–4)
GLUCOSE BLD STRIP.AUTO-MCNC: 302 MG/DL (ref 74–106)
GLUCOSE SERPL-MCNC: 289 MG/DL (ref 74–99)
HCT VFR BLD AUTO: 43.2 % (ref 36–48)
HCT VFR BLD CALC: 44 % (ref 36–49)
HDSCOM,HDSCOM: ABNORMAL
HGB BLD-MCNC: 13.7 G/DL (ref 13–16)
HGB BLD-MCNC: 15 G/DL (ref 12–16)
LYMPHOCYTES # BLD AUTO: 10 % (ref 21–52)
LYMPHOCYTES # BLD: 1 K/UL (ref 0.9–3.6)
MCH RBC QN AUTO: 28.5 PG (ref 24–34)
MCHC RBC AUTO-ENTMCNC: 31.7 G/DL (ref 31–37)
MCV RBC AUTO: 89.8 FL (ref 74–97)
METHADONE UR QL: NEGATIVE
MONOCYTES # BLD: 0.3 K/UL (ref 0.05–1.2)
MONOCYTES NFR BLD AUTO: 3 % (ref 3–10)
NEUTS SEG # BLD: 8.8 K/UL (ref 1.8–8)
NEUTS SEG NFR BLD AUTO: 87 % (ref 40–73)
OPIATES UR QL: NEGATIVE
PCP UR QL: NEGATIVE
PLATELET # BLD AUTO: 286 K/UL (ref 135–420)
PMV BLD AUTO: 9.7 FL (ref 9.2–11.8)
POTASSIUM BLD-SCNC: 3.7 MMOL/L (ref 3.5–5.5)
POTASSIUM SERPL-SCNC: 3.7 MMOL/L (ref 3.5–5.5)
PROT SERPL-MCNC: 6.8 G/DL (ref 6.4–8.2)
RBC # BLD AUTO: 4.81 M/UL (ref 4.7–5.5)
SODIUM BLD-SCNC: 145 MMOL/L (ref 136–145)
SODIUM SERPL-SCNC: 145 MMOL/L (ref 136–145)
WBC # BLD AUTO: 10.2 K/UL (ref 4.6–13.2)

## 2017-02-11 PROCEDURE — 77030005563 HC CATH URETH INT MMGH -A

## 2017-02-11 PROCEDURE — 80053 COMPREHEN METABOLIC PANEL: CPT | Performed by: EMERGENCY MEDICINE

## 2017-02-11 PROCEDURE — 70450 CT HEAD/BRAIN W/O DYE: CPT

## 2017-02-11 PROCEDURE — 96374 THER/PROPH/DIAG INJ IV PUSH: CPT

## 2017-02-11 PROCEDURE — 99285 EMERGENCY DEPT VISIT HI MDM: CPT

## 2017-02-11 PROCEDURE — 80307 DRUG TEST PRSMV CHEM ANLYZR: CPT | Performed by: EMERGENCY MEDICINE

## 2017-02-11 PROCEDURE — 93005 ELECTROCARDIOGRAM TRACING: CPT

## 2017-02-11 PROCEDURE — 74011250636 HC RX REV CODE- 250/636

## 2017-02-11 PROCEDURE — 71010 XR CHEST PORT: CPT

## 2017-02-11 PROCEDURE — 80047 BASIC METABLC PNL IONIZED CA: CPT

## 2017-02-11 PROCEDURE — 85025 COMPLETE CBC W/AUTO DIFF WBC: CPT | Performed by: EMERGENCY MEDICINE

## 2017-02-11 RX ORDER — NALOXONE HYDROCHLORIDE 1 MG/ML
1 INJECTION INTRAMUSCULAR; INTRAVENOUS; SUBCUTANEOUS
Status: COMPLETED | OUTPATIENT
Start: 2017-02-11 | End: 2017-02-11

## 2017-02-11 RX ORDER — NALOXONE HYDROCHLORIDE 1 MG/ML
INJECTION INTRAMUSCULAR; INTRAVENOUS; SUBCUTANEOUS
Status: COMPLETED
Start: 2017-02-11 | End: 2017-02-11

## 2017-02-11 RX ADMIN — NALOXONE HYDROCHLORIDE 1 MG: 1 INJECTION PARENTERAL at 07:52

## 2017-02-11 RX ADMIN — NALOXONE HYDROCHLORIDE 1 MG: 1 INJECTION INTRAMUSCULAR; INTRAVENOUS; SUBCUTANEOUS at 07:52

## 2017-02-11 NOTE — ED PROVIDER NOTES
HPI Comments: 7:47 AM Dalton Izaguirre is a 49 y.o. male with h/o HTN, DM, CAD, asthma, and GERD who presents to ED via EMS for evaluation of AMS with last known normal at 2:00 AM. EMS gave 4 narcan total; two in nose and two in arm. EMS was having to ventilate patient initially, after two narcan, patient was able to breathe on his own. No Zofran was given by EMS. Pt was not responsive when EMS arrived to house. EMS was called by patient's fiancee. EMS state patient started to become more alert after narcan was given. No other concerns or symptoms at this time. PCP: Teo Marquez MD    The history is provided by the patient. Past Medical History:   Diagnosis Date    Arthritis      left knee    Asthma     CAD (coronary artery disease)      heart attack 6/2012, Being followed by Dr. Greyson Crenshaw Depression     Diabetes ([facility] [address]     GERD (gastroesophageal reflux disease)     Hypertension     Psychiatric disorder      depression and schziophenia    Schizophrenia ([facility] [address]      Will be seeing Tepha    Unspecified sleep apnea      cpap machine,Will be seeing neurologist       Past Surgical History:   Procedure Laterality Date    Hx orthopaedic       L knee 1987, 1996    Pr abdomen surgery proc unlisted      Hx heart catheterization       cardiac cath     Hx hernia repair      Hx other surgical       eye sx at 2 yrs old   [de-identified] Hx knee replacement       left TKR         Family History:   Problem Relation Age of Onset    Anemia Mother     Cancer Father      prostate    Cancer Brother     Diabetes Brother        Social History     Social History    Marital status: SINGLE     Spouse name: N/A    Number of children: N/A    Years of education: N/A     Occupational History    Not on file.      Social History Main Topics    Smoking status: Former Smoker     Packs/day: 0.25    Smokeless tobacco: Not on file    Alcohol use No      Comment: \"a little\"    Drug use: No    Sexual activity: Yes     Partners: Female     Other Topics Concern    Not on file     Social History Narrative         ALLERGIES: Norco [hydrocodone-acetaminophen] and Shellfish derived    Review of Systems   Unable to perform ROS: Mental status change       Vitals:    02/11/17 1118 02/11/17 1200 02/11/17 1217 02/11/17 1257   BP: 131/85 119/77  (!) 145/93   Pulse: 76 76  78   Resp: 18 18 18 18   Temp: 97.2 °F (36.2 °C)      SpO2: 94% 92% 96% 94%   Weight:                Physical Exam   Constitutional: He appears well-developed and well-nourished. No distress. Face mask in place. HENT:   Head: Normocephalic and atraumatic. Mouth/Throat: Oropharynx is clear and moist.   Eyes: Conjunctivae and EOM are normal. Pupils are equal, round, and reactive to light. No scleral icterus. Right pupil appears to have surgical change. Let pupils appears to be 3 to 4 mm. Pupils are not pinpoint. Neck: Normal range of motion. Neck supple. Cardiovascular: Regular rhythm and normal heart sounds. Tachycardia present. No murmur heard. Pulmonary/Chest: Effort normal and breath sounds normal. No respiratory distress. Abdominal: Soft. Bowel sounds are normal. He exhibits no distension. There is no tenderness. Musculoskeletal: He exhibits no edema. Lymphadenopathy:     He has no cervical adenopathy. Neurological:   Does not follow commands. Purposeful movements. Patient opens eyes to noxious stimulus. Goes right back to sleep after being woken up. Skin: Skin is warm and dry. No rash noted. Psychiatric:   Somnolent. Nursing note and vitals reviewed. MDM  Number of Diagnoses or Management Options  Narcotic overdose, accidental or unintentional, initial encounter:   Diagnosis management comments: Pt unresponsive mild improvement with Narcan assumed possible overdose however only report was cocaine use last night.  Now awakens with sternal rub only with purposeful movement does not follow commands min verbal      Ekg; nsr rate 97 no acute st changes Qtc 490 QRS 84    1:16 PM  Alert ambulatory in ED sats 94% on RA will dc home pt denies suicide attempt        Amount and/or Complexity of Data Reviewed  Clinical lab tests: ordered and reviewed  Tests in the radiology section of CPT®: ordered and reviewed  Independent visualization of images, tracings, or specimens: yes    Risk of Complications, Morbidity, and/or Mortality  Presenting problems: high  Diagnostic procedures: moderate  Management options: moderate    Patient Progress  Patient progress: stable    ED Course       Procedures    Vitals:  Patient Vitals for the past 12 hrs:   Temp Pulse Resp BP SpO2   02/11/17 1257 - 78 18 (!) 145/93 94 %   02/11/17 1217 - - 18 - 96 %   02/11/17 1200 - 76 18 119/77 92 %   02/11/17 1118 97.2 °F (36.2 °C) 76 18 131/85 94 %   02/11/17 0933 - - - - 97 %   02/11/17 0930 - 89 22 - (!) 83 %   02/11/17 0915 - 88 21 126/82 96 %   02/11/17 0900 - 88 22 134/82 99 %   02/11/17 0845 - 90 21 114/69 99 %   02/11/17 0748 - (!) 108 15 110/72 90 %         Medications ordered:   Medications   naloxone (NARCAN) injection 1 mg (1 mg IntraVENous Given 2/11/17 0752)         Lab findings:  Recent Results (from the past 12 hour(s))   POC CHEM8    Collection Time: 02/11/17  7:54 AM   Result Value Ref Range    CO2 (POC) 26 (H) 19 - 24 MMOL/L    Glucose (POC) 302 (H) 74 - 106 MG/DL    BUN (POC) 10 7 - 18 MG/DL    Creatinine (POC) 1.3 0.6 - 1.3 MG/DL    GFR-AA (POC) >60 >60 ml/min/1.73m2    GFR, non-AA (POC) 59 (L) >60 ml/min/1.73m2    Sodium (POC) 145 136 - 145 MMOL/L    Potassium (POC) 3.7 3.5 - 5.5 MMOL/L    Calcium, ionized (POC) 1.09 (L) 1.12 - 1.32 MMOL/L    Chloride (POC) 103 100 - 108 MMOL/L    Anion gap (POC) 21 (H) 10 - 20      Hematocrit (POC) 44 36 - 49 %    Hemoglobin (POC) 15.0 12 - 16 G/DL   EKG, 12 LEAD, INITIAL    Collection Time: 02/11/17  7:57 AM   Result Value Ref Range    Ventricular Rate 97 BPM    Atrial Rate 97 BPM    P-R Interval 142 ms    QRS Duration 84 ms    Q-T Interval 386 ms    QTC Calculation (Bezet) 490 ms    Calculated P Axis 37 degrees    Calculated R Axis -39 degrees    Calculated T Axis -32 degrees    Diagnosis       Normal sinus rhythm  Left axis deviation  Moderate voltage criteria for LVH, may be normal variant  Nonspecific ST and T wave abnormality  Prolonged QT  Abnormal ECG  No previous ECGs available     CBC WITH AUTOMATED DIFF    Collection Time: 02/11/17  8:01 AM   Result Value Ref Range    WBC 10.2 4.6 - 13.2 K/uL    RBC 4.81 4.70 - 5.50 M/uL    HGB 13.7 13.0 - 16.0 g/dL    HCT 43.2 36.0 - 48.0 %    MCV 89.8 74.0 - 97.0 FL    MCH 28.5 24.0 - 34.0 PG    MCHC 31.7 31.0 - 37.0 g/dL    RDW 14.9 (H) 11.6 - 14.5 %    PLATELET 037 079 - 294 K/uL    MPV 9.7 9.2 - 11.8 FL    NEUTROPHILS 87 (H) 40 - 73 %    LYMPHOCYTES 10 (L) 21 - 52 %    MONOCYTES 3 3 - 10 %    EOSINOPHILS 0 0 - 5 %    BASOPHILS 0 0 - 2 %    ABS. NEUTROPHILS 8.8 (H) 1.8 - 8.0 K/UL    ABS. LYMPHOCYTES 1.0 0.9 - 3.6 K/UL    ABS. MONOCYTES 0.3 0.05 - 1.2 K/UL    ABS. EOSINOPHILS 0.0 0.0 - 0.4 K/UL    ABS. BASOPHILS 0.0 0.0 - 0.06 K/UL    DF AUTOMATED     METABOLIC PANEL, COMPREHENSIVE    Collection Time: 02/11/17  8:01 AM   Result Value Ref Range    Sodium 145 136 - 145 mmol/L    Potassium 3.7 3.5 - 5.5 mmol/L    Chloride 106 100 - 108 mmol/L    CO2 26 21 - 32 mmol/L    Anion gap 13 3.0 - 18 mmol/L    Glucose 289 (H) 74 - 99 mg/dL    BUN 11 7.0 - 18 MG/DL    Creatinine 1.59 (H) 0.6 - 1.3 MG/DL    BUN/Creatinine ratio 7 (L) 12 - 20      GFR est AA 57 (L) >60 ml/min/1.73m2    GFR est non-AA 47 (L) >60 ml/min/1.73m2    Calcium 8.3 (L) 8.5 - 10.1 MG/DL    Bilirubin, total 0.3 0.2 - 1.0 MG/DL    ALT (SGPT) 78 (H) 16 - 61 U/L    AST (SGOT) 75 (H) 15 - 37 U/L    Alk.  phosphatase 96 45 - 117 U/L    Protein, total 6.8 6.4 - 8.2 g/dL    Albumin 3.5 3.4 - 5.0 g/dL    Globulin 3.3 2.0 - 4.0 g/dL    A-G Ratio 1.1 0.8 - 1.7     ETHYL ALCOHOL    Collection Time: 02/11/17  8:01 AM   Result Value Ref Range    ALCOHOL(ETHYL),SERUM 3 0 - 3 MG/DL   DRUG SCREEN, URINE    Collection Time: 02/11/17  8:04 AM   Result Value Ref Range    BENZODIAZEPINE NEGATIVE  NEG      BARBITURATES NEGATIVE  NEG      THC (TH-CANNABINOL) NEGATIVE  NEG      OPIATES NEGATIVE  NEG      PCP(PHENCYCLIDINE) NEGATIVE  NEG      COCAINE POSITIVE (A) NEG      AMPHETAMINE NEGATIVE  NEG      METHADONE NEGATIVE       HDSCOM (NOTE)        EKG interpretation by ED Physician:       X-Ray, CT or other radiology findings or impressions:  XR CHEST PORT   Final Result   IMPRESSION:     Low lung volumes without acute cardiopulmonary process. Interpreted by radiologist 9:19 AM   CT HEAD WO CONT   Final Result   IMPRESSION:     No acute intracranial process, specifically, no evidence of intracranial hemorrhage, mass effect, or midline shift. Interpreted by radiologist 8:47 AM       Orders:  Orders Placed This Encounter    CT HEAD WO CONT     Standing Status:   Standing     Number of Occurrences:   1     Order Specific Question:   Transport     Answer:   Dee [5]     Order Specific Question:   Is Patient Allergic to Contrast Dye?      Answer:   No    XR CHEST PORT     Standing Status:   Standing     Number of Occurrences:   1     Order Specific Question:   Reason for Exam     Answer:   overdose    CBC WITH AUTOMATED DIFF     Standing Status:   Standing     Number of Occurrences:   1    METABOLIC PANEL, COMPREHENSIVE     Standing Status:   Standing     Number of Occurrences:   1    ETHYL ALCOHOL     Standing Status:   Standing     Number of Occurrences:   1    DRUG SCREEN, URINE     Standing Status:   Standing     Number of Occurrences:   1    POC CHEM8     Standing Status:   Standing     Number of Occurrences:   1    EKG, 12 LEAD, INITIAL     Standing Status:   Standing     Number of Occurrences:   1     Order Specific Question:   Reason for Exam:     Answer:   overdose    naloxone (NARCAN) 1 mg/mL injection     [place] Push : cabinet override    naloxone (NARCAN) injection 1 mg       Progress notes, Consult notes, or additional Procedure notes:   12:00 PM Patient is now more alert. Patient's o2 saturation is in high 80's on room air and blood pressure slightly low. Will order fluids and reevaluate. 1:09 PM I have reassessed the patient and discussed their results and diagnosis. Pt will be discharged in stable condition. Patient is to return to emergency department if any new or worsening condition. Patient understands and verbalizes agreement with plan. Disposition:  Diagnosis:   1. Narcotic overdose, accidental or unintentional, initial encounter        Disposition: Discharged    Follow-up Information     Follow up With Details Comments Contact [facility] EMERGENCY DEPT  As needed, If symptoms worsen [address]  [phone]    Wai Kam MD Schedule an appointment as soon as possible for a visit  [facility]  [phone]             Patient's Medications   Start Taking    No medications on file   Continue Taking    ALBUTEROL (PROVENTIL HFA, VENTOLIN HFA, PROAIR HFA) 90 MCG/ACTUATION INHALER    1-2 Puffs. ALBUTEROL (VENTOLIN HFA) 90 MCG/ACTUATION INHALER    Take 1 Puff by inhalation every six (6) hours as needed. ASPIRIN (ASPIRIN) 325 MG TABLET    325 mg. ASPIRIN PO    Take 81 mg by mouth. CYCLOBENZAPRINE (FLEXERIL) 10 MG TABLET    Cyclobenzaprine HCl - 10 MG Oral Tablet  take 1 tablet by mouth every 8 hours if needed for UP TO 5 DAYS   Quantity: 12; Refills: 0     Started 22-Nov-2016  Active    DOCUSATE SODIUM (COLACE) 100 MG CAPSULE    Take one po daily prn constipation    DULOXETINE (CYMBALTA) 30 MG CAPSULE    TAKE 1 CAPSULE BY MOUTH EVERY MORNING    FLUTICASONE-SALMETEROL (ADVAIR DISKUS) 500-50 MCG/DOSE DISKUS INHALER    Take 1 Puff by inhalation every twelve (12) hours.     FLUTICASONE-SALMETEROL (ADVAIR) 500-50 MCG/DOSE DISKUS INHALER    1 Puff. LIDOCAINE (XYLOCAINE) 5 % OINTMENT    APPLY THIN FILM RUB EXTERNALLY TWICE DAILY IN THE MORNING AND EVENING    LIDOCAINE (XYLOCAINE) 5 % OINTMENT    Use 1 Application to affected area Take As Needed. LISINOPRIL (PRINIVIL, ZESTRIL) 10 MG TABLET    Take 20 mg by mouth daily. LISINOPRIL (PRINIVIL, ZESTRIL) 20 MG TABLET    TAKE 1 TABLET BY MOUTH EVERY DAY AS DIRECTED    LISINOPRIL (PRINIVIL, ZESTRIL) 5 MG TABLET    5 mg. LORATADINE (CLARITIN) 10 MG TABLET    Take 10 mg by mouth daily. LORATADINE-PSEUDOEPHEDRINE (CLARITIN-D 24-HOUR)  MG PER TABLET    Take 1 Tab by Mouth Once a Day. METFORMIN (GLUCOPHAGE) 1,000 MG TABLET    Take 1,000 mg by mouth two (2) times a day. METFORMIN (GLUCOPHAGE) 500 MG TABLET    500 mg. METFORMIN ER 1,000 MG TR24    TAKE 1 TABLET BY MOUTH 2 TIMES A DAY    METOPROLOL SUCCINATE (TOPROL-XL) 25 MG XL TABLET    Take 25 mg by mouth daily. METOPROLOL SUCCINATE (TOPROL-XL) 25 MG XL TABLET    25 mg. MONTELUKAST (SINGULAIR) 10 MG TABLET    Take 10 mg by mouth daily. MONTELUKAST (SINGULAIR) 10 MG TABLET    10 mg. NAPROXEN (NAPROSYN) 500 MG TABLET    500 mg. NITROGLYCERIN (NITROSTAT) 0.4 MG SL TABLET    0.4 mg by SubLINGual route every five (5) minutes as needed for Chest Pain. NITROGLYCERIN (NITROSTAT) 0.4 MG SL TABLET    0.4 mg.    OMEGA 3-DHA-EPA-FISH OIL (FISH OIL) 60- MG CAP    Take 500 mg by mouth daily. OMEGA 3-DHA-EPA-FISH OIL (FISH OIL) 60- MG CAP    500 mg. OMEPRAZOLE (PRILOSEC) 20 MG CAPSULE    Take 20 mg by mouth daily. OMEPRAZOLE (PRILOSEC) 20 MG CAPSULE    20 mg.    OMEPRAZOLE (PRILOSEC) 40 MG CAPSULE    TAKE 1 CAPSULE BY MOUTH 2 TIMES A DAY    OXYCODONE-ACETAMINOPHEN (PERCOCET) 5-325 MG PER TABLET    Take 1 Tab by mouth every eight (8) hours as needed for Pain. Max Daily Amount: 3 Tabs. PRAVASTATIN (PRAVACHOL) 20 MG TABLET    Take 20 mg by mouth nightly.     PRAVASTATIN (PRAVACHOL) 20 MG TABLET    20 mg.    TIOTROPIUM (SPIRIVA) 18 MCG INHALATION CAPSULE    Take 1 Cap by inhalation daily. TIOTROPIUM (SPIRIVA) 18 MCG INHALATION CAPSULE    18 mcg. These Medications have changed    No medications on file   Stop Taking    No medications on file       [id number] [facility] for and in the presence of Angelica Santoro MD (02/11/17)      Physician Attestation  I personally performed the services described in this documentation, reviewed and edited the documentation which was dictated to the scribe in my presence, and it accurately records my words and actions.     Angelica Santoro MD (02/11/17)      Signed by: Rhodia Habermann, Scribe, February 11, 2017 at 1:09 PM

## 2017-02-11 NOTE — ED NOTES
Pt has minimal response after narcan, pt breathing 35 times a minute after administration. Pt responsive to straight cath with initial insertion then calmed down immediately after. Dr Zeynep Rea aware.

## 2017-02-11 NOTE — DISCHARGE INSTRUCTIONS
Alcohol, Drug, or Poison Ingestion: Care Instructions  Your Care Instructions  A person can become very sick, or die, from swallowing or using alcohol, drugs, or poisons. Alcohol poisoning occurs when a person drinks a large amount of alcohol. Alcohol can stop nerve signals that control breathing. It can also stop the gag reflex that prevents choking. Alcohol poisoning is serious. It can lead to brain damage or death if it's not treated right away. Drugs can be used by accident or on purpose. They can be swallowed, inhaled, injected, or absorbed through the skin. Drugs include over-the-counter medicine (such as aspirin or acetaminophen) and prescription medicine. They also include vitamins and supplements. And they include illegal drugs such as cocaine and heroin. And poisons are all around us. They include household , cosmetics, houseplants, and garden chemicals. The doctor has checked you carefully, but problems can develop later. If you notice any problems or new symptoms, get medical treatment right away. Follow-up care is a key part of your treatment and safety. Be sure to make and go to all appointments, and call your doctor if you are having problems. It's also a good idea to know your test results and keep a list of the medicines you take. How can you care for yourself at home? Alcohol problems  · Talk to your doctor or counselor about programs that can help you stop using alcohol. · Plan ways to avoid being tempted to drink. ¨ Get rid of all alcohol in your home. ¨ Avoid places where you tend to drink. ¨ Stay away from places or events that offer alcohol. ¨ Stay away from people who drink a lot. Drug problems  · Talk to your doctor about programs that can help you stop using drugs. · Get rid of any drugs you might be tempted to misuse. · Learn how to say no when other people use drugs. · Don't spend time with people who use drugs.   Poison prevention  · Keep products in the containers they came in. Keep them with the original labels. · Be careful when you use cleaning products, paints, solvents, and pesticides. Read labels before use. Use a fan to move strong odors and fumes out of your home. · Do not mix cleaning products. Try to use nontoxic . These include vinegar, lemon juice, and baking soda. When should you call for help? Poison control centers, hospitals, or your doctor can give immediate advice in the case of a poisoning. The [facility] [employer] number is [phone]. Have the poison container with you so you can give complete information to the poison control center, such as what the poison or substance is, how much was taken and when. Do not try to make the person vomit. Call 911 anytime you think you may need emergency care. For example, call if you or someone else:  · Has used or currently uses alcohol or drugs and is very confused or can't stay awake. · Has passed out (lost consciousness). · Has severe trouble breathing. · Is having a seizure. Call your doctor now or seek immediate medical care if you or someone else:  · Has new symptoms, or is not acting normally. Watch closely for changes in your health, and be sure to contact your doctor if:  · You do not get better as expected. · You need help with drug or alcohol problems. · You have problems with depression or other mental health issues. Where can you learn more? Go to http://carlos a-fernando.info/. Enter M474 in the search box to learn more about \"Alcohol, Drug, or Poison Ingestion: Care Instructions. \"  Current as of: May 27, 2016  Content Version: 11.1  © 2062-3849 [employer]. Care instructions adapted under license by [employer] (which disclaims liability or warranty for this information).  If you have questions about a medical condition or this instruction, always ask your healthcare professional. [employer] disclaims any warranty or liability for your use of this information.

## 2017-02-11 NOTE — ED NOTES
Pt taken off NRB while family in room and pt awake.   When family left and pt sleeping, pt desat down to 82%, placed back on NRB

## 2017-02-11 NOTE — ED NOTES
Pt now awake but drowsy, alert and oriented times 4. Pt states he smoked crack cocaine and snorted a pill, states normally does not do that and does not know what the medication was.   Removed NRB and placed on 4L NC.

## 2017-02-11 NOTE — ED NOTES
Discharge instructions reviewed with patient and verbalized understanding. PIV removed. Pt ambulated to waiting room with a steady gait, family to drive him home.

## 2017-02-11 NOTE — ED NOTES
Pt moved to [facility] for critical care pt arriving, pt placed on portable oxygen and portable monitor.

## 2017-02-11 NOTE — ED NOTES
Pt falling asleep off and on and O2 sats appear to be dropping while patient is sleeping.   Pt states he has a history of sleep apnea and uses CPAP at night

## 2017-02-11 NOTE — ED TRIAGE NOTES
Pt found unresponsive by family member this morning, pt with history cocaine use and was seen at 0200 smoking it. Pt arrives to ER responsive to pain, not following commands. Pt given 4mg Narcan by EMS.

## 2017-02-12 LAB
ATRIAL RATE: 97 BPM
CALCULATED P AXIS, ECG09: 37 DEGREES
CALCULATED R AXIS, ECG10: -39 DEGREES
CALCULATED T AXIS, ECG11: -32 DEGREES
DIAGNOSIS, 93000: NORMAL
P-R INTERVAL, ECG05: 142 MS
Q-T INTERVAL, ECG07: 386 MS
QRS DURATION, ECG06: 84 MS
QTC CALCULATION (BEZET), ECG08: 490 MS
VENTRICULAR RATE, ECG03: 97 BPM

## 2017-02-13 ENCOUNTER — PATIENT OUTREACH (OUTPATIENT)
Dept: INTERNAL MEDICINE CLINIC | Age: 47
End: 2017-02-13

## 2017-02-13 NOTE — PROGRESS NOTES
Transitional Care Nurse Navigator Note:  Emergency Department Follow Up for Susy 2/11/17    Per EMR due to: Over dose    NN outgoing call to patient. Not able to reach pt. Left message on voice mail for return call. Pt has an appt 2/14/17 with Lohn.

## 2017-02-14 ENCOUNTER — OFFICE VISIT (OUTPATIENT)
Dept: ORTHOPEDIC SURGERY | Age: 47
End: 2017-02-14

## 2017-02-14 VITALS
BODY MASS INDEX: 33.62 KG/M2 | RESPIRATION RATE: 16 BRPM | TEMPERATURE: 98 F | SYSTOLIC BLOOD PRESSURE: 164 MMHG | HEART RATE: 70 BPM | WEIGHT: 227 LBS | DIASTOLIC BLOOD PRESSURE: 100 MMHG | HEIGHT: 69 IN | OXYGEN SATURATION: 95 %

## 2017-02-14 DIAGNOSIS — M23.41 LOOSE, BODY, JOINT, KNEE, RIGHT: ICD-10-CM

## 2017-02-14 DIAGNOSIS — M17.32 POST-TRAUMATIC OSTEOARTHRITIS OF LEFT KNEE: ICD-10-CM

## 2017-02-14 DIAGNOSIS — S83.241A ACUTE MEDIAL MENISCAL TEAR, RIGHT, INITIAL ENCOUNTER: Primary | ICD-10-CM

## 2017-02-14 RX ORDER — OXYCODONE AND ACETAMINOPHEN 5; 325 MG/1; MG/1
1 TABLET ORAL
Qty: 30 TAB | Refills: 0 | Status: SHIPPED | OUTPATIENT
Start: 2017-02-14 | End: 2017-03-08

## 2017-02-14 NOTE — PATIENT INSTRUCTIONS
Meniscus Tear: Care Instructions  Your Care Instructions    The meniscus is rubbery tissue in the knee that acts as a shock absorber between the upper and lower leg bones. The meniscus also keeps your knee stable by spreading weight across it. Each knee has two menisci (plural of meniscus). You can tear a meniscus if you plant your foot and twist, or pivot. The meniscus also can wear down as you age, and it can tear from squatting or kneeling. Small tears may heal on their own with rest and some physical therapy. But a more serious tear may need surgery to repair it or to remove part of the meniscus. Your doctor may want you to see a doctor who specializes in bones and sports injuries. Follow-up care is a key part of your treatment and safety. Be sure to make and go to all appointments, and call your doctor if you are having problems. It's also a good idea to know your test results and keep a list of the medicines you take. How can you care for yourself at home? · Rest your knee when possible. · Do not squat or kneel. · Take pain medicines exactly as directed. ¨ If the doctor gave you a prescription medicine for pain, take it as prescribed. ¨ If you are not taking a prescription pain medicine, ask your doctor if you can take an over-the-counter medicine. · Put ice or a cold pack on your knee for 10 to 20 minutes at a time. Try to do this every 1 to 2 hours for the next 3 days (when you are awake) or until the swelling goes down. Put a thin cloth between the ice and your skin. · Prop up the sore leg on a pillow when you ice your knee or any time you sit or lie down during the next 3 days. Try to keep your leg above the level of your heart. This will help reduce swelling. · Follow your doctor's directions for using crutches or a knee brace, if suggested. · Follow your doctor's directions for exercises to keep your knee mobile and your leg muscles strong.  Here are a few exercises you can try if your doctor says it is okay. ¨ Quad sets: Lie down on the floor or the bed with your injured leg straight. Fully extend your leg--there should be no or little bend in your knee. Tighten the thigh (quadriceps) of your injured leg for 6 seconds. Do not lift your heel up. Relax your quadriceps for 10 seconds. Repeat this exercise 8 to 12 times several times during the day. ¨ Straight-leg raises: Lie down on the floor or the bed with your injured leg flat and your uninjured leg bent so that the bottom of your foot is on the floor or bed. Tighten the quadriceps of your injured leg. Keeping your knee as straight as possible, lift your injured leg off the bed until it is about 18 inches above the bed or floor. Lower your leg back down and relax for 5 seconds. Do 3 sets of 20 repetitions, or if you tire quickly, 3 sets of 8 to 12 repetitions. ¨ Heel raises: Stand with your feet a few inches apart. Rest your hands lightly on a counter or chair in front of you. Slowly raise your heels off the floor while keeping your knees straight. Hold for 3 seconds, then slowly lower your heels to the floor. Do 3 sets of 8 to 12 repetitions. ¨ Heel slides: Lie down on the floor or the bed with your leg flat. Slowly begin to slide your heel toward your rear end (buttocks), keeping your heel on the floor. Your knee will begin to bend. Slide your heel and bend your knee until it becomes a little sore and you can feel a small amount of pressure inside your knee. Hold this position for 10 seconds. Slide your heel back down until your leg is straight on the floor. Relax for 10 seconds. Repeat this exercise 20 times. When should you call for help? Watch closely for changes in your health, and be sure to contact your doctor if:  · You have increasing knee pain or swelling or both. · Your knee is so sore or stiff that you cannot walk on it. · You do not get better as expected. Where can you learn more?   Go to http://carlos a-fernando.info/. Enter M527 in the search box to learn more about \"Meniscus Tear: Care Instructions. \"  Current as of: May 23, 2016  Content Version: 11.1  © 6539-8039 [employer]. Care instructions adapted under license by [employer] (which disclaims liability or warranty for this information). If you have questions about a medical condition or this instruction, always ask your healthcare professional. [place] [address] any warranty or liability for your use of this information.
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
## 2017-02-14 NOTE — MR AVS SNAPSHOT
Visit Information Date & Time Provider Department Dept. Phone Encounter #  
 2/14/2017  3:40 PM Radha Kramer, [address] [facility] [phone] [id number] Upcoming Health Maintenance Date Due  
 LIPID PANEL Q1 1969 FOOT EXAM Q1 2/5/1979 MICROALBUMIN Q1 2/5/1979 EYE EXAM RETINAL OR DILATED Q1 2/5/1979 HEMOGLOBIN A1C Q6M 2/5/2015 DTaP/Tdap/Td series (2 - Td) 12/12/2026 Allergies as of 2/14/2017  Review Complete On: 2/14/2017 By: Radha Kramer,  Severity Noted Reaction Type Reactions Norco [Hydrocodone-acetaminophen] High 12/12/2016    Nausea and Vomiting Gi distress Shellfish Derived High 07/31/2014    Swelling, Angioedema Other reaction(s): anaphylaxis/angioedema [address] AND CR[facility] Current Immunizations  Never Reviewed Name Date Influenza Vaccine 10/27/2014, 9/1/2014 Pneumococcal Polysaccharide (PPSV-23) 11/4/2014 12:17 PM, 10/31/2013 Not reviewed this visit You Were Diagnosed With   
  
 Codes Comments Acute medial meniscal tear, right, initial encounter    -  Primary ICD-10-CM: V35.254F ICD-9-CM: 836.0 Post-traumatic osteoarthritis of left knee     ICD-10-CM: M17.32 
ICD-9-CM: 715.26 Loose, body, joint, knee, right     ICD-10-CM: M23.41 
ICD-9-CM: 717.6 Vitals BP Pulse Temp Resp Height(growth percentile) Weight(growth percentile) (!) 164/100 (BP 1 Location: Right arm, BP Patient Position: Sitting) 70 98 °F (36.7 °C) (Oral) 16 5' 9\" (1.753 m) 227 lb (103 kg) SpO2 BMI Smoking Status 95% 33.52 kg/m2 Former Smoker Vitals History BMI and BSA Data Body Mass Index Body Surface Area  
 33.52 kg/m 2 2.24 m 2 Preferred Pharmacy Pharmacy Name Phone [facility] [address] [place] [address] Your Updated Medication List  
  
   
 This list is accurate as of: 2/14/17  4:18 PM.  Always use your most recent med list.  
  
  
  
  
 * ADVAIR DISKUS 500-50 mcg/dose diskus inhaler Generic drug:  fluticasone-salmeterol Take 1 Puff by inhalation every twelve (12) hours. * fluticasone-salmeterol 500-50 mcg/dose diskus inhaler Commonly known as:  ADVAIR  
1 Puff. * ASPIRIN PO Take 81 mg by mouth. * aspirin 325 mg tablet Commonly known as:  ASPIRIN  
325 mg.  
  
 cyclobenzaprine 10 mg tablet Commonly known as:  FLEXERIL Cyclobenzaprine HCl - 10 MG Oral Tablet take 1 tablet by mouth every 8 hours if needed for UP TO 5 DAYS  Quantity: 12; Refills: 0   Started 22-Nov-2016 Active  
  
 docusate sodium 100 mg capsule Commonly known as:  Daiana Dilling Take one po daily prn constipation DULoxetine 30 mg capsule Commonly known as:  CYMBALTA TAKE 1 CAPSULE BY MOUTH EVERY MORNING  
  
 * FISH OIL 60- mg Cap Generic drug:  omega 3-dha-epa-fish oil Take 500 mg by mouth daily. * FISH OIL 60- mg Cap Generic drug:  omega 3-dha-epa-fish oil 500 mg.  
  
 * lidocaine 5 % ointment Commonly known as:  XYLOCAINE Use 1 Application to affected area Take As Needed. * lidocaine 5 % ointment Commonly known as:  XYLOCAINE  
APPLY THIN FILM RUB EXTERNALLY TWICE DAILY IN THE MORNING AND EVENING  
  
 * lisinopril 10 mg tablet Commonly known as:  Mikayla Sahae Take 20 mg by mouth daily. * lisinopril 5 mg tablet Commonly known as:  PRINIVIL, ZESTRIL  
5 mg.  
  
 * lisinopril 20 mg tablet Commonly known as:  PRINIVIL, ZESTRIL  
TAKE 1 TABLET BY MOUTH EVERY DAY AS DIRECTED  
  
 loratadine 10 mg tablet Commonly known as:  Gena Dustman Take 10 mg by mouth daily. loratadine-pseudoephedrine  mg per tablet Commonly known as:  CLARITIN-D 24-hour Take 1 Tab by Mouth Once a Day. * metFORMIN 1,000 mg tablet Commonly known as:  GLUCOPHAGE  
 Take 1,000 mg by mouth two (2) times a day. * GLUCOPHAGE 500 mg tablet Generic drug:  metFORMIN  
500 mg.  
  
 * metFORMIN ER 1,000 mg Tr24 TAKE 1 TABLET BY MOUTH 2 TIMES A DAY  
  
 * metoprolol succinate 25 mg XL tablet Commonly known as:  TOPROL-XL Take 25 mg by mouth daily. * metoprolol succinate 25 mg XL tablet Commonly known as:  TOPROL-XL 25 mg.  
  
 * montelukast 10 mg tablet Commonly known as:  SINGULAIR Take 10 mg by mouth daily. * montelukast 10 mg tablet Commonly known as:  SINGULAIR  
10 mg. NAPROSYN 500 mg tablet Generic drug:  naproxen 500 mg.  
  
 * NITROSTAT 0.4 mg SL tablet Generic drug:  nitroglycerin 0.4 mg by SubLINGual route every five (5) minutes as needed for Chest Pain. * nitroglycerin 0.4 mg SL tablet Commonly known as:  NITROSTAT  
0.4 mg.  
  
 * omeprazole 20 mg capsule Commonly known as:  PRILOSEC Take 20 mg by mouth daily. * omeprazole 20 mg capsule Commonly known as:  PRILOSEC  
20 mg.  
  
 * omeprazole 40 mg capsule Commonly known as:  PRILOSEC  
TAKE 1 CAPSULE BY MOUTH 2 TIMES A DAY  
  
 oxyCODONE-acetaminophen 5-325 mg per tablet Commonly known as:  PERCOCET Take 1 Tab by mouth every eight (8) hours as needed for Pain. Max Daily Amount: 3 Tabs. * pravastatin 20 mg tablet Commonly known as:  PRAVACHOL Take 20 mg by mouth nightly. * pravastatin 20 mg tablet Commonly known as:  PRAVACHOL  
20 mg.  
  
 * tiotropium 18 mcg inhalation capsule Commonly known as:  Marvia Gottron Take 1 Cap by inhalation daily. * tiotropium 18 mcg inhalation capsule Commonly known as:  Marvia Gottron 18 mcg. * VENTOLIN HFA 90 mcg/actuation inhaler Generic drug:  albuterol Take 1 Puff by inhalation every six (6) hours as needed. * albuterol 90 mcg/actuation inhaler Commonly known as:  PROVENTIL HFA, VENTOLIN HFA, PROAIR HFA  
1-2 Puffs. * Notice: This list has 29 medication(s) that are the same as other medications prescribed for you. Read the directions carefully, and ask your doctor or other care provider to review them with you. Prescriptions Printed Refills  
 oxyCODONE-acetaminophen (PERCOCET) 5-325 mg per tablet 0 Sig: Take 1 Tab by mouth every eight (8) hours as needed for Pain. Max Daily Amount: 3 Tabs. Class: Print Route: Oral  
  
We Performed the Following REFERRAL TO ORTHOPEDICS [[id number] Custom] Referral Information Referral ID Referred By Referred To  
  
 [id number] SAMMIE HASSAN [facility], Michael Paige MD   
   [address] [place] [address], [address] [place] [address] Phone: [phone] Fax: [phone] Visits Status Start Date End Date 1 New Request 2/14/17 2/14/18 If your referral has a status of pending review or denied, additional information will be sent to support the outcome of this decision. Patient Instructions Meniscus Tear: Care Instructions Your Care Instructions The meniscus is rubbery tissue in the knee that acts as a shock absorber between the upper and lower leg bones. The meniscus also keeps your knee stable by spreading weight across it. Each knee has two menisci (plural of meniscus). You can tear a meniscus if you plant your foot and twist, or pivot. The meniscus also can wear down as you age, and it can tear from squatting or kneeling. Small tears may heal on their own with rest and some physical therapy. But a more serious tear may need surgery to repair it or to remove part of the meniscus. Your doctor may want you to see a doctor who specializes in bones and sports injuries. Follow-up care is a key part of your treatment and safety. Be sure to make and go to all appointments, and call your doctor if you are having problems. It's also a good idea to know your test results and keep a list of the medicines you take. How can you care for yourself at home? · Rest your knee when possible. · Do not squat or kneel. · Take pain medicines exactly as directed. ¨ If the doctor gave you a prescription medicine for pain, take it as prescribed. ¨ If you are not taking a prescription pain medicine, ask your doctor if you can take an over-the-counter medicine. · Put ice or a cold pack on your knee for 10 to 20 minutes at a time. Try to do this every 1 to 2 hours for the next 3 days (when you are awake) or until the swelling goes down. Put a thin cloth between the ice and your skin. · Prop up the sore leg on a pillow when you ice your knee or any time you sit or lie down during the next 3 days. Try to keep your leg above the level of your heart. This will help reduce swelling. · Follow your doctor's directions for using crutches or a knee brace, if suggested. · Follow your doctor's directions for exercises to keep your knee mobile and your leg muscles strong. Here are a few exercises you can try if your doctor says it is okay. ¨ Quad sets: Lie down on the floor or the bed with your injured leg straight. Fully extend your legthere should be no or little bend in your knee. Tighten the thigh (quadriceps) of your injured leg for 6 seconds. Do not lift your heel up. Relax your quadriceps for 10 seconds. Repeat this exercise 8 to 12 times several times during the day. ¨ Straight-leg raises: Lie down on the floor or the bed with your injured leg flat and your uninjured leg bent so that the bottom of your foot is on the floor or bed. Tighten the quadriceps of your injured leg. Keeping your knee as straight as possible, lift your injured leg off the bed until it is about 18 inches above the bed or floor. Lower your leg back down and relax for 5 seconds. Do 3 sets of 20 repetitions, or if you tire quickly, 3 sets of 8 to 12 repetitions. ¨ Heel raises: Stand with your feet a few inches apart.  Rest your hands lightly on a counter or chair in front of you. Slowly raise your heels off the floor while keeping your knees straight. Hold for 3 seconds, then slowly lower your heels to the floor. Do 3 sets of 8 to 12 repetitions. ¨ Heel slides: Lie down on the floor or the bed with your leg flat. Slowly begin to slide your heel toward your rear end (buttocks), keeping your heel on the floor. Your knee will begin to bend. Slide your heel and bend your knee until it becomes a little sore and you can feel a small amount of pressure inside your knee. Hold this position for 10 seconds. Slide your heel back down until your leg is straight on the floor. Relax for 10 seconds. Repeat this exercise 20 times. When should you call for help? Watch closely for changes in your health, and be sure to contact your doctor if: 
· You have increasing knee pain or swelling or both. · Your knee is so sore or stiff that you cannot walk on it. · You do not get better as expected. Where can you learn more? Go to http://carlos a-fernando.info/. Enter I827 in the search box to learn more about \"Meniscus Tear: Care Instructions. \" Current as of: May 23, 2016 Content Version: 11.1 © 9420-8672 [employer]. Care instructions adapted under license by [employer] (which disclaims liability or warranty for this information). If you have questions about a medical condition or this instruction, always ask your healthcare professional. [place] [address] any warranty or liability for your use of this information. Introducing [facility]! Dear Tessa Phelps: Thank you for requesting a Mindshapes account. Our records indicate that you already have an active Mindshapes account. You can access your account anytime at [email or web address]. [employer] Did you know that you can access your hospital and ER discharge instructions at any time in Mindshapes?   You can also review all of your test results from your hospital stay or ER visit. Additional Information If you have questions, please visit the Frequently Asked Questions section of the Pinguo website at [email or web address] Remember, Pinguo is NOT to be used for urgent needs. For medical emergencies, dial 911. Now available from your iPhone and Android! Please provide this summary of care documentation to your next provider. Your primary care clinician is listed as Tony Howard. If you have any questions after today's visit, please call [phone].
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
## 2017-03-08 ENCOUNTER — OFFICE VISIT (OUTPATIENT)
Dept: INTERNAL MEDICINE CLINIC | Age: 47
End: 2017-03-08

## 2017-03-08 VITALS
BODY MASS INDEX: 31.9 KG/M2 | HEIGHT: 69 IN | RESPIRATION RATE: 18 BRPM | SYSTOLIC BLOOD PRESSURE: 126 MMHG | TEMPERATURE: 97.8 F | WEIGHT: 215.4 LBS | DIASTOLIC BLOOD PRESSURE: 86 MMHG | OXYGEN SATURATION: 95 % | HEART RATE: 95 BPM

## 2017-03-08 DIAGNOSIS — M17.32 POST-TRAUMATIC OSTEOARTHRITIS OF LEFT KNEE: ICD-10-CM

## 2017-03-08 DIAGNOSIS — M23.41 LOOSE, BODY, JOINT, KNEE, RIGHT: ICD-10-CM

## 2017-03-08 DIAGNOSIS — S83.241A ACUTE MEDIAL MENISCAL TEAR, RIGHT, INITIAL ENCOUNTER: ICD-10-CM

## 2017-03-08 RX ORDER — OXYCODONE AND ACETAMINOPHEN 5; 325 MG/1; MG/1
1 TABLET ORAL
Qty: 30 TAB | Refills: 0 | Status: SHIPPED | OUTPATIENT
Start: 2017-03-08 | End: 2017-03-28 | Stop reason: SDUPTHER

## 2017-03-08 NOTE — PROGRESS NOTES
ROOM # 1    Hector Donald presents today for   Chief Complaint   Patient presents with    Knee Pain     zen knee pain, kathe right       Hector Donald preferred language for health care discussion is english/other. Is someone accompanying this pt? no    Is the patient using any DME equipment during [address]? Yes, cane    Depression Screening:  PHQ 2 / 9, over the last two weeks 12/12/2016   Little interest or pleasure in doing things Several days   Feeling down, depressed or hopeless Several days   Total Score PHQ 2 2       Learning Assessment:  Learning Assessment 7/7/2016 5/18/2015   PRIMARY LEARNER Patient Patient   HIGHEST LEVEL OF EDUCATION - PRIMARY LEARNER  - 2 YEARS OF COLLEGE   PRIMARY LANGUAGE ENGLISH ENGLISH   LEARNER PREFERENCE PRIMARY LISTENING DEMONSTRATION   ANSWERED BY self patient   RELATIONSHIP SELF SELF       Abuse Screening:  No flowsheet data found. Fall Risk  Fall Risk Assessment, last 12 mths 8/4/2014   Able to walk? Yes   Fall in past 12 months? No       Health Maintenance reviewed and discussed per provider. Yes    Hector Donald is due for foot exam, eye exam, lipid panel, hgbA1C, microalbumin. Please order/place referral if appropriate. Advance Directive:  1. Do you have an advance directive in place? Patient Reply: no    2. If not, would you like material regarding how to put one in place? Patient Reply: no    Coordination of Care:  1. Have you been to the ER, urgent care clinic since your last visit? Hospitalized since your last visit? [facility] for poss drug OD    2. Have you seen or consulted any other health care providers outside of the [facility] since your last visit? Include any pap smears or colon screening.  no

## 2017-03-08 NOTE — PROGRESS NOTES
Chief Complaint   Patient presents with    Knee Pain     zen knee pain, kathe right       HPI:     Amber Crook is a 49 y.o.  male with history of  Arthritis and asthma here for the above complaint. He saw Dr. Jason Hull who referred him to Dr. Adelfo Peters and can't see him until April 2017 for right knee surgery. He needs pain medication until he can see orthopedics. He denies any chest pain, shortness of breath, abdominal pain, headaches or dizziness. The pain is constant and the percocet helps with his pain. Status Provider Status      Final result (Exam End: 1/26/2017  9:03 PM) Reviewed    Study Result   ============================  [facility]  ============================     INDICATION: 79-year-old patient with right knee pain and swelling status post  injury.     TECHNIQUE: Sagittal proton density with and without fat saturation; coronal  proton density with and without fat saturation; axial proton density with fat  saturation images of the knee were obtained.     COMPARISONS: No prior imaging of the right knee is available.     FINDINGS:  -----------     MEDIAL COMPARTMENT: There is a complex tear of the medial meniscus involving the  body and posterior horn, with a near complete radial component present just  proximal to the meniscal root. There is deep partial-thickness chondral loss  involving the periphery of the posterior weightbearing medial femoral condyle  with subchondral marrow edema present.     LATERAL COMPARTMENT: Markedly truncated appearance to the lateral meniscus body  and posterior horn. There is full-thickness chondral loss involving the posterior weightbearing  portions of the lateral femoral condyle, with small foci of subchondral marrow  edema.     CRUCIATE AND COLLATERAL LIGAMENTS: The anterior and posterior cruciate ligaments  are intact.  Bilateral collateral ligaments, as well as the supporting structures  of the posterior lateral corner appear within normal limits.     EXTENSOR MECHANISM, PATELLOFEMORAL COMPARTMENT: Quadriceps and patellar tendons  are normal. No morphology of patellar maltracking. There is full-thickness  chondral fissuring of the midportion of the patellar median ridge with  subchondral marrow edema. Matching severity deep partial-thickness chondrosis of  the central trochlea noted.     JOINT SPACE, SYNOVIUM: There is a large knee joint effusion. Multiple  intra-articular ossified bodies are noted at the posterior aspect of the  superior intercondylar notch. There is a large multilocular Baker cyst present.     OSSEOUS: The osseous structures to include the patella are unremarkable. There  is no evidence for fracture or contusion.        REGIONAL SOFT TISSUES: Neurovascular bundles are normal in appearance.     IMPRESSION  IMPRESSION:  --------------     1. Complex tear of the right knee medial meniscus involving the body and  posterior horn, extending to and involving the meniscal root. 2. Considerably truncated appearance to the lateral meniscus body and posterior  horn, the appearance of which is most in keeping with prior meniscectomy. 3. Tricompartmental right knee chondrosis as above, most pronounced in the  tibiofemoral compartments. 4. Large joint effusion with several intra-articular bodies present in the  posterior/dependent portion of the intercondylar notch.   5. Large multilocular Baker's cyst.             Past Medical History:   Diagnosis Date    Arthritis     left knee    Asthma     CAD (coronary artery disease)     heart attack 6/2012, Being followed by Dr. Marc Shah Cocaine use 2017    Depression     Diabetes ([facility] [address]     GERD (gastroesophageal reflux disease)     Hypertension     Psychiatric disorder     depression and schziophenia    Schizophrenia ([facility] [address]     Will be seeing [facility]    Unspecified sleep apnea     cpap machine,Will be seeing neurologist     Past Surgical History: Procedure Laterality Date    ABDOMEN SURGERY PROC UNLISTED      HX HEART CATHETERIZATION      cardiac cath     HX HERNIA REPAIR      HX KNEE REPLACEMENT      left TKR    HX ORTHOPAEDIC      L knee 1987, 1996    HX OTHER SURGICAL      eye sx at 2 yrs old     Current Outpatient Prescriptions   Medication Sig    oxyCODONE-acetaminophen (PERCOCET) 5-325 mg per tablet Take 1 Tab by mouth every eight (8) hours as needed for Pain. Max Daily Amount: 3 Tabs.  DULoxetine (CYMBALTA) 30 mg capsule TAKE 1 CAPSULE BY MOUTH EVERY MORNING    lidocaine (XYLOCAINE) 5 % ointment APPLY THIN FILM RUB EXTERNALLY TWICE DAILY IN THE MORNING AND EVENING    lisinopril (PRINIVIL, ZESTRIL) 20 mg tablet TAKE 1 TABLET BY MOUTH EVERY DAY AS DIRECTED    omeprazole (PRILOSEC) 40 mg capsule TAKE 1 CAPSULE BY MOUTH 2 TIMES A DAY    albuterol (PROVENTIL HFA, VENTOLIN HFA, PROAIR HFA) 90 mcg/actuation inhaler 1-2 Puffs.  loratadine-pseudoephedrine (CLARITIN-D 24-HOUR)  mg per tablet Take 1 Tab by Mouth Once a Day.  omega 3-dha-epa-fish oil (FISH OIL) 60- mg cap Take 500 mg by mouth daily.  pravastatin (PRAVACHOL) 20 mg tablet Take 20 mg by mouth nightly.  tiotropium (SPIRIVA) 18 mcg inhalation capsule Take 1 Cap by inhalation daily.  ASPIRIN PO Take 81 mg by mouth.  docusate sodium (COLACE) 100 mg capsule Take one po daily prn constipation    metoprolol succinate (TOPROL-XL) 25 mg XL tablet Take 25 mg by mouth daily.  montelukast (SINGULAIR) 10 mg tablet Take 10 mg by mouth daily.  loratadine (CLARITIN) 10 mg tablet Take 10 mg by mouth daily.  fluticasone-salmeterol (ADVAIR DISKUS) 500-50 mcg/dose diskus inhaler Take 1 Puff by inhalation every twelve (12) hours.  nitroglycerin (NITROSTAT) 0.4 mg SL tablet 0.4 mg by SubLINGual route every five (5) minutes as needed for Chest Pain. No current facility-administered medications for this visit.       Health Maintenance   Topic Date Due    LIPID PANEL Q1  1970    FOOT EXAM Q1  02/05/1979    MICROALBUMIN Q1  02/05/1979    EYE EXAM RETINAL OR DILATED Q1  02/05/1979    HEMOGLOBIN A1C Q6M  02/05/2015    DTaP/Tdap/Td series (2 - Td) 12/12/2026    Pneumococcal 19-64 Medium Risk  Completed    INFLUENZA AGE 9 TO ADULT  Completed     Immunization History   Administered Date(s) Administered    Influenza Vaccine 09/01/2014, 10/27/2014    Pneumococcal Polysaccharide (PPSV-23) 10/31/2013, 11/04/2014     No LMP for male patient. Allergies and Intolerances: Allergies   Allergen Reactions    Iodine Anaphylaxis    Norco [Hydrocodone-Acetaminophen] Nausea and Vomiting     Gi distress    Shellfish Derived Swelling and Angioedema     Other reaction(s): anaphylaxis/angioedema  [place] AND CRAB       Family History:   Family History   Problem Relation Age of Onset    Anemia Mother     Cancer Father      prostate    Cancer Brother     Diabetes Brother        Social History:   He  reports that he has quit smoking. He smoked 0.25 packs per day. He does not have any smokeless tobacco history on file. He  reports that he does not drink alcohol. ·     Objective:   Physical exam:   Visit Vitals    /86 (BP 1 Location: Left arm, BP Patient Position: Sitting)    Pulse 95    Temp 97.8 °F (36.6 °C) (Oral)    Resp 18    Ht 5' 9\" (1.753 m)    Wt 215 lb 6.4 oz (97.7 kg)    SpO2 95%    BMI 31.81 kg/m2        Generally: Pleasant male in no acute distress  Cardiac Exam: regular, rate, and rhythm. Normal S1 and S2. No murmurs, gallops, or rubs  Pulmonary exam: Clear to auscultation bilaterally  Bilateral knee exam: good rom, TTP in the right knee area. No TTP in the left knee.     LABS/Radiological Tests:  Lab Results   Component Value Date/Time    WBC 10.2 02/11/2017 08:01 AM    HGB 13.7 02/11/2017 08:01 AM    HCT 43.2 02/11/2017 08:01 AM    PLATELET 300 31/17/1491 08:01 AM     Lab Results   Component Value Date/Time    Sodium 145 02/11/2017 08:01 AM    Potassium 3.7 02/11/2017 08:01 AM    Chloride 106 02/11/2017 08:01 AM    CO2 26 02/11/2017 08:01 AM    Glucose 289 02/11/2017 08:01 AM    BUN 11 02/11/2017 08:01 AM    Creatinine 1.59 02/11/2017 08:01 AM     No results found for: CHOL, CHOLX, CHLST, CHOLV, HDL, LDL, DLDL, LDLC, DLDLP, TGL, TGLX, TRIGL, TRIGP  No results found for: GPT    Previous labs      ASSESSMENT/PLAN:    1. Post-traumatic osteoarthritis of left knee  -     oxyCODONE-acetaminophen (PERCOCET) 5-325 mg per tablet; Take 1 Tab by mouth every eight (8) hours as needed for Pain. Max Daily Amount: 3 Tabs. 2. Acute medial meniscal tear, right, initial encounter  -     oxyCODONE-acetaminophen (PERCOCET) 5-325 mg per tablet; Take 1 Tab by mouth every eight (8) hours as needed for Pain. Max Daily Amount: 3 Tabs. 3. Loose, body, joint, knee, right  -     oxyCODONE-acetaminophen (PERCOCET) 5-325 mg per tablet; Take 1 Tab by mouth every eight (8) hours as needed for Pain. Max Daily Amount: 3 Tabs. 4.   Requested Prescriptions     Signed Prescriptions Disp Refills    oxyCODONE-acetaminophen (PERCOCET) 5-325 mg per tablet 30 Tab 0     Sig: Take 1 Tab by mouth every eight (8) hours as needed for Pain. Max Daily Amount: 3 Tabs. 5. Patient verbalized understanding and agreement with the plan. 6. Patient was given an after-visit summary. 7. Follow-up Disposition:  Return if symptoms worsen or fail to improve. or sooner if worsening symptoms.                 Yane Tovar MD

## 2017-03-08 NOTE — MR AVS SNAPSHOT
Visit Information Date & Time Provider Department Dept. Phone Encounter #  
 3/8/2017  4:15 PM Tony Andrade MD [facility] [phone] [id number] Follow-up Instructions Return if symptoms worsen or fail to improve. Your Appointments 3/14/2017  2:20 PM  
Follow Up with Governor Divine DO  
[facility]) Appt Note: RT KNEE PAIN FU  
 [address], [address] [id number]  
[phone]  
  
   
 [address], [address] [facility] [address] [facility] Upcoming Health Maintenance Date Due  
 LIPID PANEL Q1 1970 FOOT EXAM Q1 2/5/1979 MICROALBUMIN Q1 2/5/1979 EYE EXAM RETINAL OR DILATED Q1 2/5/1979 HEMOGLOBIN A1C Q6M 2/5/2015 DTaP/Tdap/Td series (2 - Td) 12/12/2026 Allergies as of 3/8/2017  Review Complete On: 3/8/2017 By: Sofi Choudhury MD  
  
 Severity Noted Reaction Type Reactions Iodine High 04/01/2014    Anaphylaxis Norco [Hydrocodone-acetaminophen] High 12/12/2016    Nausea and Vomiting Gi distress Shellfish Derived High 07/31/2014    Swelling, Angioedema Other reaction(s): anaphylaxis/angioedema [address] AND CRAB Current Immunizations  Never Reviewed Name Date Influenza Vaccine 10/27/2014, 9/1/2014 Pneumococcal Polysaccharide (PPSV-23) 11/4/2014 12:17 PM, 10/31/2013 Not reviewed this visit You Were Diagnosed With   
  
 Codes Comments Post-traumatic osteoarthritis of left knee     ICD-10-CM: M17.32 
ICD-9-CM: 715.26 Acute medial meniscal tear, right, initial encounter     ICD-10-CM: M78.154I ICD-9-CM: 836.0 Loose, body, joint, knee, right     ICD-10-CM: M23.41 
ICD-9-CM: 717.6 Vitals BP Pulse Temp Resp Height(growth percentile) Weight(growth percentile) 126/86 (BP 1 Location: Left arm, BP Patient Position: Sitting) 95 97.8 °F (36.6 °C) (Oral) 18 5' 9\" (1.753 m) 215 lb 6.4 oz (97.7 kg) SpO2 BMI Smoking Status 95% 31.81 kg/m2 Former Smoker Vitals History BMI and BSA Data Body Mass Index Body Surface Area  
 31.81 kg/m 2 2.18 m 2 Preferred Pharmacy Pharmacy Name Phone [facility] [address] [facility] Your Updated Medication List  
  
   
This list is accurate as of: 3/8/17  4:41 PM.  Always use your most recent med list.  
  
  
  
  
 ADVAIR DISKUS 500-50 mcg/dose diskus inhaler Generic drug:  fluticasone-salmeterol Take 1 Puff by inhalation every twelve (12) hours. albuterol 90 mcg/actuation inhaler Commonly known as:  PROVENTIL HFA, VENTOLIN HFA, PROAIR HFA  
1-2 Puffs. ASPIRIN PO Take 81 mg by mouth. docusate sodium 100 mg capsule Commonly known as:  Maretta Willard Take one po daily prn constipation DULoxetine 30 mg capsule Commonly known as:  CYMBALTA TAKE 1 CAPSULE BY MOUTH EVERY MORNING  
  
 FISH OIL 60- mg Cap Generic drug:  omega 3-dha-epa-fish oil Take 500 mg by mouth daily. lidocaine 5 % ointment Commonly known as:  XYLOCAINE  
APPLY THIN FILM RUB EXTERNALLY TWICE DAILY IN THE MORNING AND EVENING  
  
 lisinopril 20 mg tablet Commonly known as:  PRINIVIL, ZESTRIL  
TAKE 1 TABLET BY MOUTH EVERY DAY AS DIRECTED  
  
 loratadine 10 mg tablet Commonly known as:  Garlan Baas Take 10 mg by mouth daily. loratadine-pseudoephedrine  mg per tablet Commonly known as:  CLARITIN-D 24-hour Take 1 Tab by Mouth Once a Day. metoprolol succinate 25 mg XL tablet Commonly known as:  TOPROL-XL Take 25 mg by mouth daily. montelukast 10 mg tablet Commonly known as:  SINGULAIR Take 10 mg by mouth daily. NITROSTAT 0.4 mg SL tablet Generic drug:  nitroglycerin 0.4 mg by SubLINGual route every five (5) minutes as needed for Chest Pain. omeprazole 40 mg capsule Commonly known as:  PRILOSEC  
 TAKE 1 CAPSULE BY MOUTH 2 TIMES A DAY  
  
 oxyCODONE-acetaminophen 5-325 mg per tablet Commonly known as:  PERCOCET Take 1 Tab by mouth every eight (8) hours as needed for Pain. Max Daily Amount: 3 Tabs. pravastatin 20 mg tablet Commonly known as:  PRAVACHOL Take 20 mg by mouth nightly. tiotropium 18 mcg inhalation capsule Commonly known as:  Steve Bunkers Take 1 Cap by inhalation daily. Prescriptions Printed Refills  
 oxyCODONE-acetaminophen (PERCOCET) 5-325 mg per tablet 0 Sig: Take 1 Tab by mouth every eight (8) hours as needed for Pain. Max Daily Amount: 3 Tabs. Class: Print Route: Oral  
  
Follow-up Instructions Return if symptoms worsen or fail to improve. Patient Instructions 1) follow-up as needed or sooner if worsening symptoms [facility]! Deakrupa De La [place] Fails: Thank you for requesting a [employer] account. Our records indicate that you already have an active Vicarious account. You can access your account anytime at [email or web address] Did you know that you can access your hospital and ER discharge instructions at any time in [employer]? You can also review all of your test results from your hospital stay or ER visit. Additional Information If you have questions, please visit the Frequently Asked Questions section of the V[employer]ious website at [email or web address] Remember, [employer] is NOT to be used for urgent needs. For medical emergencies, dial 911. Now available from your iPhone and Android! Please provide this summary of care documentation to your next provider. Your primary care clinician is listed as Tony Howard. If you have any questions after today's visit, please call [phone].

## 2017-03-16 ENCOUNTER — TELEPHONE (OUTPATIENT)
Dept: ORTHOPEDIC SURGERY | Age: 47
End: 2017-03-16

## 2017-03-16 NOTE — TELEPHONE ENCOUNTER
Spoke with patient advised that we missed him on 3/14/17 he stated he did not get the reminder call we rescheduled him for 3/21/17 @ 8 am.  Advised to go ahead and put it in his phone incase he doesn't get a reminder call on Monday.   Patient stated he would

## 2017-03-23 ENCOUNTER — TELEPHONE (OUTPATIENT)
Dept: ORTHOPEDIC SURGERY | Age: 47
End: 2017-03-23

## 2017-03-23 NOTE — TELEPHONE ENCOUNTER
Called patient to reschedule for second missed appointment on 3/21/2017. If Patient calls please reschedule patient.

## 2017-03-28 ENCOUNTER — OFFICE VISIT (OUTPATIENT)
Dept: ORTHOPEDIC SURGERY | Age: 47
End: 2017-03-28

## 2017-03-28 VITALS
OXYGEN SATURATION: 95 % | RESPIRATION RATE: 16 BRPM | SYSTOLIC BLOOD PRESSURE: 129 MMHG | BODY MASS INDEX: 32.14 KG/M2 | HEIGHT: 69 IN | HEART RATE: 95 BPM | DIASTOLIC BLOOD PRESSURE: 80 MMHG | WEIGHT: 217 LBS | TEMPERATURE: 98 F

## 2017-03-28 DIAGNOSIS — M23.41 LOOSE, BODY, JOINT, KNEE, RIGHT: ICD-10-CM

## 2017-03-28 DIAGNOSIS — S83.241A ACUTE MEDIAL MENISCAL TEAR, RIGHT, INITIAL ENCOUNTER: ICD-10-CM

## 2017-03-28 DIAGNOSIS — M17.32 POST-TRAUMATIC OSTEOARTHRITIS OF LEFT KNEE: ICD-10-CM

## 2017-03-28 RX ORDER — OXYCODONE AND ACETAMINOPHEN 5; 325 MG/1; MG/1
1 TABLET ORAL
Qty: 90 TAB | Refills: 0 | Status: SHIPPED | OUTPATIENT
Start: 2017-03-28 | End: 2017-07-21

## 2017-03-28 NOTE — PATIENT INSTRUCTIONS
Meniscus Tear: Care Instructions  Your Care Instructions    The meniscus is rubbery tissue in the knee that acts as a shock absorber between the upper and lower leg bones. The meniscus also keeps your knee stable by spreading weight across it. Each knee has two menisci (plural of meniscus). You can tear a meniscus if you plant your foot and twist, or pivot. The meniscus also can wear down as you age, and it can tear from squatting or kneeling. Small tears may heal on their own with rest and some physical therapy. But a more serious tear may need surgery to repair it or to remove part of the meniscus. Your doctor may want you to see a doctor who specializes in bones and sports injuries. Follow-up care is a key part of your treatment and safety. Be sure to make and go to all appointments, and call your doctor if you are having problems. It's also a good idea to know your test results and keep a list of the medicines you take. How can you care for yourself at home? · Rest your knee when possible. · Do not squat or kneel. · Take pain medicines exactly as directed. ¨ If the doctor gave you a prescription medicine for pain, take it as prescribed. ¨ If you are not taking a prescription pain medicine, ask your doctor if you can take an over-the-counter medicine. · Put ice or a cold pack on your knee for 10 to 20 minutes at a time. Try to do this every 1 to 2 hours for the next 3 days (when you are awake) or until the swelling goes down. Put a thin cloth between the ice and your skin. · Prop up the sore leg on a pillow when you ice your knee or any time you sit or lie down during the next 3 days. Try to keep your leg above the level of your heart. This will help reduce swelling. · Follow your doctor's directions for using crutches or a knee brace, if suggested. · Follow your doctor's directions for exercises to keep your knee mobile and your leg muscles strong.  Here are a few exercises you can try if your doctor says it is okay. ¨ Quad sets: Lie down on the floor or the bed with your injured leg straight. Fully extend your leg--there should be no or little bend in your knee. Tighten the thigh (quadriceps) of your injured leg for 6 seconds. Do not lift your heel up. Relax your quadriceps for 10 seconds. Repeat this exercise 8 to 12 times several times during the day. ¨ Straight-leg raises: Lie down on the floor or the bed with your injured leg flat and your uninjured leg bent so that the bottom of your foot is on the floor or bed. Tighten the quadriceps of your injured leg. Keeping your knee as straight as possible, lift your injured leg off the bed until it is about 18 inches above the bed or floor. Lower your leg back down and relax for 5 seconds. Do 3 sets of 20 repetitions, or if you tire quickly, 3 sets of 8 to 12 repetitions. ¨ Heel raises: Stand with your feet a few inches apart. Rest your hands lightly on a counter or chair in front of you. Slowly raise your heels off the floor while keeping your knees straight. Hold for 3 seconds, then slowly lower your heels to the floor. Do 3 sets of 8 to 12 repetitions. ¨ Heel slides: Lie down on the floor or the bed with your leg flat. Slowly begin to slide your heel toward your rear end (buttocks), keeping your heel on the floor. Your knee will begin to bend. Slide your heel and bend your knee until it becomes a little sore and you can feel a small amount of pressure inside your knee. Hold this position for 10 seconds. Slide your heel back down until your leg is straight on the floor. Relax for 10 seconds. Repeat this exercise 20 times. When should you call for help? Watch closely for changes in your health, and be sure to contact your doctor if:  · You have increasing knee pain or swelling or both. · Your knee is so sore or stiff that you cannot walk on it. · You do not get better as expected. Where can you learn more?   Go to http://carlos a-fernando.info/. Enter H959 in the search box to learn more about \"Meniscus Tear: Care Instructions. \"  Current as of: May 23, 2016  Content Version: 11.1  © 1435-3913 [employer]. Care instructions adapted under license by [employer] (which disclaims liability or warranty for this information). If you have questions about a medical condition or this instruction, always ask your healthcare professional. [place] [address] any warranty or liability for your use of this information.

## 2017-03-28 NOTE — Clinical Note
Will refill percocet #90 until appt w/ surgeon and he will likely contact you about chronic pain and request referral to pain management. Thanks for letting me try to fix him up.

## 2017-03-28 NOTE — PROGRESS NOTES
HISTORY OF PRESENT ILLNESS    Stephanie Amador is a 49y.o. year old male comes in today to be evaluated and treated for: right knee pain (has not gone to see Orthopedic for his consult)    Has issues with loose joint body and meniscal tear in right knee and pending appt w/ ortho but insurance starts [id number] and can see surgeon 30 days later. Has been using percocet for pain control 3/day and helps. Social History     Social History    Marital status: SINGLE     Spouse name: N/A    Number of children: N/A    Years of education: N/A     Social History Main Topics    Smoking status: Former Smoker     Packs/day: 0.25    Smokeless tobacco: None    Alcohol use No      Comment: \"a little\"    Drug use: No    Sexual activity: Yes     Partners: Female     Other Topics Concern    None     Social History Narrative     Current Outpatient Prescriptions   Medication Sig Dispense Refill    oxyCODONE-acetaminophen (PERCOCET) 5-325 mg per tablet Take 1 Tab by mouth every eight (8) hours as needed for Pain. Max Daily Amount: 3 Tabs. 30 Tab 0    DULoxetine (CYMBALTA) 30 mg capsule TAKE 1 CAPSULE BY MOUTH EVERY MORNING  0    lidocaine (XYLOCAINE) 5 % ointment APPLY THIN FILM RUB EXTERNALLY TWICE DAILY IN THE MORNING AND EVENING  3    lisinopril (PRINIVIL, ZESTRIL) 20 mg tablet TAKE 1 TABLET BY MOUTH EVERY DAY AS DIRECTED  10    omeprazole (PRILOSEC) 40 mg capsule TAKE 1 CAPSULE BY MOUTH 2 TIMES A DAY  10    albuterol (PROVENTIL HFA, VENTOLIN HFA, PROAIR HFA) 90 mcg/actuation inhaler 1-2 Puffs.  loratadine-pseudoephedrine (CLARITIN-D 24-HOUR)  mg per tablet Take 1 Tab by Mouth Once a Day.  omega 3-dha-epa-fish oil (FISH OIL) 60- mg cap Take 500 mg by mouth daily.  pravastatin (PRAVACHOL) 20 mg tablet Take 20 mg by mouth nightly.  tiotropium (SPIRIVA) 18 mcg inhalation capsule Take 1 Cap by inhalation daily.  ASPIRIN PO Take 81 mg by mouth.       docusate sodium (COLACE) 100 mg capsule Take one po daily prn constipation 30 Cap 3    metoprolol succinate (TOPROL-XL) 25 mg XL tablet Take 25 mg by mouth daily.  montelukast (SINGULAIR) 10 mg tablet Take 10 mg by mouth daily.  loratadine (CLARITIN) 10 mg tablet Take 10 mg by mouth daily.  fluticasone-salmeterol (ADVAIR DISKUS) 500-50 mcg/dose diskus inhaler Take 1 Puff by inhalation every twelve (12) hours.  nitroglycerin (NITROSTAT) 0.4 mg SL tablet 0.4 mg by SubLINGual route every five (5) minutes as needed for Chest Pain. Past Medical History:   Diagnosis Date    Arthritis     left knee    Asthma     CAD (coronary artery disease)     heart attack 6/2012, Being followed by Dr. Venegas Neither Cocaine use 2017    Depression     Diabetes ([facility]     GERD (gastroesophageal reflux disease)     Hypertension     Psychiatric disorder     depression and schziophenia    Schizophrenia ([facility] [address] [facility]     Will be seeing [facility]    Unspecified sleep apnea     cpap machine,Will be seeing neurologist     Family History   Problem Relation Age of Onset    Anemia Mother     Cancer Father      prostate    Cancer Brother     Diabetes Brother          ROS:  + reji, laika, lock right knee    Objective:  Visit Vitals    /80 (BP 1 Location: Right arm, BP Patient Position: Sitting)    Pulse 95    Temp 98 °F (36.7 °C) (Oral)    Resp 16    Ht 5' 9\" (1.753 m)    Wt 217 lb (98.4 kg)    SpO2 95%    BMI 32.05 kg/m2     GEN: Appears stated age in NAD. HEAD: Normocephalic, atraumatic. NEURO: Sensation intact light touch B/L lower extremities. MS: LE Strength +5/5 bilateral .   right Knee:  2+ Effusion . Lachman negative. Valgus positive B/L. Varus negative. positive joint line tenderness medial, posterior. Ashok positive. Posterior drawer positive. Noble compression test negative. Patellar apprehension negative. Patellar facet positive tender to palpation medial no crepitance right.  Loss ROM left knee to 90 degrees active 110 passive. EXT: no clubbing/cyanosis. no edema. SKIN: Warm/dry without rash. Assessment/Plan:     ICD-10-CM ICD-9-CM    1. Post-traumatic osteoarthritis of left knee M17.32 715.26 oxyCODONE-acetaminophen (PERCOCET) 5-325 mg per tablet   2. Acute medial meniscal tear, right, initial encounter S83.241A 836.0 oxyCODONE-acetaminophen (PERCOCET) 5-325 mg per tablet   3. Loose, body, joint, knee, right M23.41 717.6 oxyCODONE-acetaminophen (PERCOCET) 5-325 mg per tablet     Patient verbalizes understanding of evaluation and plan. Will refill percocet 90 tabs to allow for appt with surgeon. Will need long term pain management plan afterwards if Sx not improved w/ surgery. Time with Pt 27 minutes, >50% of which was counseling pt regarding Dx and Tx options and coordination of care.  reviewed and free of abherent finding(s): Yes.

## 2017-03-28 NOTE — PROGRESS NOTES
1. Have you been to the ER, urgent care clinic since your last visit? Hospitalized since your last visit? No    2. Have you seen or consulted any other health care providers outside of the [address] [place] [address] since your last visit? Include any pap smears or colon screening.  No

## 2017-03-28 NOTE — MR AVS SNAPSHOT
Visit Information Date & Time Provider Department Dept. Phone Encounter #  
 3/28/2017  2:00 PM Sunita Hernandez, [address] [facility] [phone] [id number] Follow-up Instructions Return if symptoms worsen or fail to improve. Upcoming Health Maintenance Date Due  
 LIPID PANEL Q1 1969 FOOT EXAM Q1 2/5/1979 MICROALBUMIN Q1 2/5/1979 EYE EXAM RETINAL OR DILATED Q1 2/5/1979 HEMOGLOBIN A1C Q6M 2/5/2015 DTaP/Tdap/Td series (2 - Td) 12/12/2026 Allergies as of 3/28/2017  Review Complete On: 3/28/2017 By: Sunita Hernandez,  Severity Noted Reaction Type Reactions Iodine High 04/01/2014    Anaphylaxis Norco [Hydrocodone-acetaminophen] High 12/12/2016    Nausea and Vomiting Gi distress Shellfish Derived High 07/31/2014    Swelling, Angioedema Other reaction(s): anaphylaxis/angioedema [address] AND [facility] Current Immunizations  Never Reviewed Name Date Influenza Vaccine 10/27/2014, 9/1/2014 Pneumococcal Polysaccharide (PPSV-23) 11/4/2014 12:17 PM, 10/31/2013 Not reviewed this visit You Were Diagnosed With   
  
 Codes Comments Post-traumatic osteoarthritis of left knee     ICD-10-CM: M17.32 
ICD-9-CM: 715.26 Acute medial meniscal tear, right, initial encounter     ICD-10-CM: H99.727Q ICD-9-CM: 836.0 Loose, body, joint, knee, right     ICD-10-CM: M23.41 
ICD-9-CM: 717.6 Vitals BP Pulse Temp Resp Height(growth percentile) Weight(growth percentile) 129/80 (BP 1 Location: Right arm, BP Patient Position: Sitting) 95 98 °F (36.7 °C) (Oral) 16 5' 9\" (1.753 m) 217 lb (98.4 kg) SpO2 BMI Smoking Status 95% 32.05 kg/m2 Former Smoker Vitals History BMI and BSA Data Body Mass Index Body Surface Area 32.05 kg/m 2 2.19 m 2 Preferred Pharmacy Pharmacy Name Phone [facility] [address] Your Updated Medication List  
  
   
This list is accurate as of: 3/28/17  2:23 PM.  Always use your most recent med list.  
  
  
  
  
 ADVAIR DISKUS 500-50 mcg/dose diskus inhaler Generic drug:  fluticasone-salmeterol Take 1 Puff by inhalation every twelve (12) hours. albuterol 90 mcg/actuation inhaler Commonly known as:  PROVENTIL HFA, VENTOLIN HFA, PROAIR HFA  
1-2 Puffs. ASPIRIN PO Take 81 mg by mouth. docusate sodium 100 mg capsule Commonly known as:  Ltanya Harshad Take one po daily prn constipation DULoxetine 30 mg capsule Commonly known as:  CYMBALTA TAKE 1 CAPSULE BY MOUTH EVERY MORNING  
  
 FISH OIL 60- mg Cap Generic drug:  omega 3-dha-epa-fish oil Take 500 mg by mouth daily. lidocaine 5 % ointment Commonly known as:  XYLOCAINE  
APPLY THIN FILM RUB EXTERNALLY TWICE DAILY IN THE MORNING AND EVENING  
  
 lisinopril 20 mg tablet Commonly known as:  PRINIVIL, ZESTRIL  
TAKE 1 TABLET BY MOUTH EVERY DAY AS DIRECTED  
  
 loratadine 10 mg tablet Commonly known as:  Vickki Braxton Take 10 mg by mouth daily. loratadine-pseudoephedrine  mg per tablet Commonly known as:  CLARITIN-D 24-hour Take 1 Tab by Mouth Once a Day. metoprolol succinate 25 mg XL tablet Commonly known as:  TOPROL-XL Take 25 mg by mouth daily. montelukast 10 mg tablet Commonly known as:  SINGULAIR Take 10 mg by mouth daily. NITROSTAT 0.4 mg SL tablet Generic drug:  nitroglycerin 0.4 mg by SubLINGual route every five (5) minutes as needed for Chest Pain. omeprazole 40 mg capsule Commonly known as:  PRILOSEC  
TAKE 1 CAPSULE BY MOUTH 2 TIMES A DAY  
  
 oxyCODONE-acetaminophen 5-325 mg per tablet Commonly known as:  PERCOCET Take 1 Tab by mouth every eight (8) hours as needed for Pain. Max Daily Amount: 3 Tabs. pravastatin 20 mg tablet Commonly known as:  PRAVACHOL Take 20 mg by mouth nightly. tiotropium 18 mcg inhalation capsule Commonly known as:  Jonas Carroll Take 1 Cap by inhalation daily. Prescriptions Printed Refills  
 oxyCODONE-acetaminophen (PERCOCET) 5-325 mg per tablet 0 Sig: Take 1 Tab by mouth every eight (8) hours as needed for Pain. Max Daily Amount: 3 Tabs. Class: Print Route: Oral  
  
Follow-up Instructions Return if symptoms worsen or fail to improve. Patient Instructions Meniscus Tear: Care Instructions Your Care Instructions The meniscus is rubbery tissue in the knee that acts as a shock absorber between the upper and lower leg bones. The meniscus also keeps your knee stable by spreading weight across it. Each knee has two menisci (plural of meniscus). You can tear a meniscus if you plant your foot and twist, or pivot. The meniscus also can wear down as you age, and it can tear from squatting or kneeling. Small tears may heal on their own with rest and some physical therapy. But a more serious tear may need surgery to repair it or to remove part of the meniscus. Your doctor may want you to see a doctor who specializes in bones and sports injuries. Follow-up care is a key part of your treatment and safety. Be sure to make and go to all appointments, and call your doctor if you are having problems. It's also a good idea to know your test results and keep a list of the medicines you take. How can you care for yourself at home? · Rest your knee when possible. · Do not squat or kneel. · Take pain medicines exactly as directed. ¨ If the doctor gave you a prescription medicine for pain, take it as prescribed. ¨ If you are not taking a prescription pain medicine, ask your doctor if you can take an over-the-counter medicine. · Put ice or a cold pack on your knee for 10 to 20 minutes at a time.  Try to do this every 1 to 2 hours for the next 3 days (when you are awake) or until the swelling goes down. Put a thin cloth between the ice and your skin. · Prop up the sore leg on a pillow when you ice your knee or any time you sit or lie down during the next 3 days. Try to keep your leg above the level of your heart. This will help reduce swelling. · Follow your doctor's directions for using crutches or a knee brace, if suggested. · Follow your doctor's directions for exercises to keep your knee mobile and your leg muscles strong. Here are a few exercises you can try if your doctor says it is okay. ¨ Quad sets: Lie down on the floor or the bed with your injured leg straight. Fully extend your legthere should be no or little bend in your knee. Tighten the thigh (quadriceps) of your injured leg for 6 seconds. Do not lift your heel up. Relax your quadriceps for 10 seconds. Repeat this exercise 8 to 12 times several times during the day. ¨ Straight-leg raises: Lie down on the floor or the bed with your injured leg flat and your uninjured leg bent so that the bottom of your foot is on the floor or bed. Tighten the quadriceps of your injured leg. Keeping your knee as straight as possible, lift your injured leg off the bed until it is about 18 inches above the bed or floor. Lower your leg back down and relax for 5 seconds. Do 3 sets of 20 repetitions, or if you tire quickly, 3 sets of 8 to 12 repetitions. ¨ Heel raises: Stand with your feet a few inches apart. Rest your hands lightly on a counter or chair in front of you. Slowly raise your heels off the floor while keeping your knees straight. Hold for 3 seconds, then slowly lower your heels to the floor. Do 3 sets of 8 to 12 repetitions. ¨ Heel slides: Lie down on the floor or the bed with your leg flat. Slowly begin to slide your heel toward your rear end (buttocks), keeping your heel on the floor. Your knee will begin to bend.  Slide your heel and bend your knee until it becomes a little sore and you can feel a small amount of pressure inside your knee. Hold this position for 10 seconds. Slide your heel back down until your leg is straight on the floor. Relax for 10 seconds. Repeat this exercise 20 times. When should you call for help? Watch closely for changes in your health, and be sure to contact your doctor if: 
· You have increasing knee pain or swelling or both. · Your knee is so sore or stiff that you cannot walk on it. · You do not get better as expected. Where can you learn more? Go to http://carlos a-fernando.info/. Enter G335 in the search box to learn more about \"Meniscus Tear: Care Instructions. \" Current as of: May 23, 2016 Content Version: 11.1 © 1586-7102 [employer]. Care instructions adapted under license by [employer] (which disclaims liability or warranty for this information). If you have questions about a medical condition or this instruction, always ask your healthcare professional. [address] any warranty or liability for your use of this information. Introducing [facility]! Dear Yolette Fails: Thank you for requesting a [employer] account. Our records indicate that you already have an active [employer] account. You can access your account anytime at [email or web address] Did you know that you can access your hospital and ER discharge instructions at any time in [employer]? You can also review all of your test results from your hospital stay or ER visit. Additional Information If you have questions, please visit the Frequently Asked Questions section of the [employer] website at [email or web address] Remember, [employer] is NOT to be used for urgent needs. For medical emergencies, dial 911. Now available from your iPhone and Android! Please provide this summary of care documentation to your next provider. Your primary care clinician is listed as Tony Howard.  If you have any questions after today's visit, please call [phone].

## 2017-05-03 ENCOUNTER — OFFICE VISIT (OUTPATIENT)
Dept: INTERNAL MEDICINE CLINIC | Age: 47
End: 2017-05-03

## 2017-05-03 VITALS
TEMPERATURE: 98.6 F | WEIGHT: 223.6 LBS | HEIGHT: 69 IN | DIASTOLIC BLOOD PRESSURE: 80 MMHG | RESPIRATION RATE: 16 BRPM | SYSTOLIC BLOOD PRESSURE: 120 MMHG | BODY MASS INDEX: 33.12 KG/M2 | OXYGEN SATURATION: 96 % | HEART RATE: 86 BPM

## 2017-05-03 DIAGNOSIS — Z00.00 WELCOME TO MEDICARE PREVENTIVE VISIT: Primary | ICD-10-CM

## 2017-05-03 DIAGNOSIS — E78.5 DYSLIPIDEMIA: ICD-10-CM

## 2017-05-03 DIAGNOSIS — M25.561 CHRONIC PAIN OF BOTH KNEES: ICD-10-CM

## 2017-05-03 DIAGNOSIS — M25.562 CHRONIC PAIN OF BOTH KNEES: ICD-10-CM

## 2017-05-03 DIAGNOSIS — E11.9 DIABETES MELLITUS, STABLE (HCC): ICD-10-CM

## 2017-05-03 DIAGNOSIS — R52 PAIN MANAGEMENT: ICD-10-CM

## 2017-05-03 DIAGNOSIS — Z13.21 ENCOUNTER FOR VITAMIN DEFICIENCY SCREENING: ICD-10-CM

## 2017-05-03 DIAGNOSIS — G89.29 CHRONIC PAIN OF BOTH KNEES: ICD-10-CM

## 2017-05-03 DIAGNOSIS — I10 BENIGN HYPERTENSION WITHOUT CHF: ICD-10-CM

## 2017-05-03 DIAGNOSIS — J45.909 UNCOMPLICATED ASTHMA, UNSPECIFIED ASTHMA SEVERITY: ICD-10-CM

## 2017-05-03 PROBLEM — Z71.89 ADVANCE DIRECTIVE DISCUSSED WITH PATIENT: Status: ACTIVE | Noted: 2017-05-03

## 2017-05-03 RX ORDER — METFORMIN HYDROCHLORIDE EXTENDED-RELEASE TABLETS 1000 MG/1
TABLET, FILM COATED, EXTENDED RELEASE ORAL
Qty: 180 TAB | Refills: 3 | Status: SHIPPED | OUTPATIENT
Start: 2017-05-03 | End: 2018-07-11 | Stop reason: SDUPTHER

## 2017-05-03 RX ORDER — METFORMIN HYDROCHLORIDE EXTENDED-RELEASE TABLETS 1000 MG/1
TABLET, FILM COATED, EXTENDED RELEASE ORAL
Refills: 4 | COMMUNITY
Start: 2017-01-24 | End: 2017-05-03 | Stop reason: SDUPTHER

## 2017-05-03 NOTE — PROGRESS NOTES
ROOM # 1    Oswaldo Syed presents today for   Chief Complaint   Patient presents with    Annual Wellness Visit    Medication Refill       Oswaldo Syed preferred language for health care discussion is english/other. Is someone accompanying this pt? no    Is the patient using any DME equipment during [address]? Yes, cane    Depression Screening:  PHQ over the last two weeks 12/12/2016   Little interest or pleasure in doing things Several days   Feeling down, depressed or hopeless Several days   Total Score PHQ 2 2       Learning Assessment:  Learning Assessment 7/7/2016 5/18/2015   PRIMARY LEARNER Patient Patient   HIGHEST LEVEL OF EDUCATION - PRIMARY LEARNER  - 2 YEARS OF COLLEGE   PRIMARY LANGUAGE ENGLISH ENGLISH   LEARNER PREFERENCE PRIMARY LISTENING DEMONSTRATION   ANSWERED BY self patient   RELATIONSHIP SELF SELF       Abuse Screening:  No flowsheet data found. Fall Risk  Fall Risk Assessment, last 12 mths 8/4/2014   Able to walk? Yes   Fall in past 12 months? No       Health Maintenance reviewed and discussed per provider. Yes    Oswaldo Syed is due for lipid, foot exam, eye exam, microalbumin, hgbA1C . Please order/place referral if appropriate. Advance Directive:  1. Do you have an advance directive in place? Patient Reply: no    2. If not, would you like material regarding how to put one in place? Patient Reply: no    Coordination of Care:  1. Have you been to the ER, urgent care clinic since your last visit? Hospitalized since your last visit? no    2. Have you seen or consulted any other health care providers outside of the [address] [place] [address] since your last visit? Include any pap smears or colon screening.  no

## 2017-05-03 NOTE — PROGRESS NOTES
Moose Paul is a 49 y.o. male and presents for annual Medicare Wellness Visit. Problem List: Reviewed with patient and discussed risk factors.     Patient Active Problem List   Diagnosis Code    DJD (degenerative joint disease) of knee M17.10    Encounter for long-term (current) use of other medications Z79.899    Left knee pain M25.562    Chronic pain syndrome G89.4    History of total knee arthroplasty Z96.659    S/P surgical manipulation of knee joint Z98.890    History of anxiety Z86.59    Schizophrenia ([facility] [address] F20.9    Unspecified sleep apnea G47.30    Psychiatric disorder F99    Hypertension I10    GERD (gastroesophageal reflux disease) K21.9    Diabetes ([facility] [address] E11.9    Depression F32.9    CAD (coronary artery disease) I25.10    Asthma J45.909    Arthritis M19.90    Cocaine use F14.10    Advance directive discussed with patient Z71.89       Current medical providers:  Patient Care Team:  Zaina Narayan MD as PCP - General (Internal Medicine)  Traci Ballard MD (Orthopedic Surgery)  Falguni Singh MD (Neurology)  Eliseo Lundy DO (Sports Medicine)  Chloe Miranda RN as Ambulatory Care Navigator    PSH: Reviewed with patient  Past Surgical History:   Procedure Laterality Date    ABDOMEN SURGERY PROC UNLISTED      HX HEART CATHETERIZATION      cardiac cath     HX HERNIA REPAIR      HX KNEE REPLACEMENT      left TKR    HX ORTHOPAEDIC      L knee 1987, 1996    HX OTHER SURGICAL      eye sx at 2 yrs old        SH: Reviewed with patient  Social History   Substance Use Topics    Smoking status: Former Smoker     Packs/day: 0.25    Smokeless tobacco: None    Alcohol use No      Comment: \"a little\"       FH: Reviewed with patient  Family History   Problem Relation Age of Onset    Anemia Mother     Cancer Father      prostate    Cancer Brother     Diabetes Brother        Medications/Allergies: Reviewed with patient  Current Outpatient Prescriptions on File Prior to Visit Medication Sig Dispense Refill    metoprolol succinate (TOPROL-XL) 25 mg XL tablet Take 1 Tab by mouth daily. 90 Tab 3    oxyCODONE-acetaminophen (PERCOCET) 5-325 mg per tablet Take 1 Tab by mouth every eight (8) hours as needed for Pain. Max Daily Amount: 3 Tabs. 90 Tab 0    DULoxetine (CYMBALTA) 30 mg capsule TAKE 1 CAPSULE BY MOUTH EVERY MORNING  0    lidocaine (XYLOCAINE) 5 % ointment APPLY THIN FILM RUB EXTERNALLY TWICE DAILY IN THE MORNING AND EVENING  3    lisinopril (PRINIVIL, ZESTRIL) 20 mg tablet TAKE 1 TABLET BY MOUTH EVERY DAY AS DIRECTED  10    omeprazole (PRILOSEC) 40 mg capsule TAKE 1 CAPSULE BY MOUTH 2 TIMES A DAY  10    albuterol (PROVENTIL HFA, VENTOLIN HFA, PROAIR HFA) 90 mcg/actuation inhaler 1-2 Puffs.  loratadine-pseudoephedrine (CLARITIN-D 24-HOUR)  mg per tablet Take 1 Tab by Mouth Once a Day.  omega 3-dha-epa-fish oil (FISH OIL) 60- mg cap Take 500 mg by mouth daily.  pravastatin (PRAVACHOL) 20 mg tablet Take 20 mg by mouth nightly.  tiotropium (SPIRIVA) 18 mcg inhalation capsule Take 1 Cap by inhalation daily.  ASPIRIN PO Take 81 mg by mouth.  docusate sodium (COLACE) 100 mg capsule Take one po daily prn constipation 30 Cap 3    montelukast (SINGULAIR) 10 mg tablet Take 10 mg by mouth daily.  fluticasone-salmeterol (ADVAIR DISKUS) 500-50 mcg/dose diskus inhaler Take 1 Puff by inhalation every twelve (12) hours.  nitroglycerin (NITROSTAT) 0.4 mg SL tablet 0.4 mg by SubLINGual route every five (5) minutes as needed for Chest Pain. No current facility-administered medications on file prior to visit.        Allergies   Allergen Reactions    Iodine Anaphylaxis    Norco [Hydrocodone-Acetaminophen] Nausea and Vomiting     Gi distress    Shellfish Derived Swelling and Angioedema     Other reaction(s): anaphylaxis/angioedema  [employer]       Objective:  Visit Vitals    /80 (BP 1 Location: Left arm, BP Patient Position: Sitting)    Pulse 86    Temp 98.6 °F (37 °C) (Oral)    Resp 16    Ht 5' 9\" (1.753 m)    Wt 223 lb 9.6 oz (101.4 kg)    SpO2 96%    BMI 33.02 kg/m2    Body mass index is 33.02 kg/(m^2). Assessment of cognitive impairment: Alert and oriented x 3    Depression Screen:   PHQ over the last two weeks 12/12/2016   Little interest or pleasure in doing things Several days   Feeling down, depressed or hopeless Several days   Total Score PHQ 2 2       Fall Risk Assessment:    Fall Risk Assessment, last 12 mths 8/4/2014   Able to walk? Yes   Fall in past 12 months? No       Functional Ability:   Does the patient exhibit a steady gait? No, pt uses a cane   How long did it take the patient to get up and walk from a sitting position? 15 seconds   Is the patient self reliant?  (ie can do own laundry, meals, household chores)  yes     Does the patient handle his/her own medications? yes     Does the patient handle his/her own money? yes     Is the patients home safe (ie good lighting, handrails on stairs and bath, etc.)? yes     Did you notice or did patient express any hearing difficulties? no     Did you notice or did patient express any vision difficulties?   no     Were distance and reading eye charts used? no       Advance Care Planning:   Patient was offered the opportunity to discuss advance care planning:  yes     Does patient have an Advance Directive:  no   If no, did you provide information on [facility] Connections?   yes       Plan:      Orders Placed This Encounter    CBC W/O DIFF    METABOLIC PANEL, COMPREHENSIVE    LIPID PANEL    TSH 3RD GENERATION    MICROALBUMIN, UR, RAND W/ MICROALBUMIN/CREA RATIO    URINALYSIS W/ RFLX MICROSCOPIC    HEMOGLOBIN A1C WITH EAG    VITAMIN D, 25 HYDROXY    REFERRAL TO OPHTHALMOLOGY    REFERRAL TO PAIN MANAGEMENT    DISCONTD: metFORMIN ER 1,000 mg tr24    metFORMIN ER 1,000 mg tr24       Health Maintenance   Topic Date Due    LIPID PANEL Q1 1969    FOOT EXAM Q1  02/05/1979    MICROALBUMIN Q1  02/05/1979    EYE EXAM RETINAL OR DILATED Q1  02/05/1979    HEMOGLOBIN A1C Q6M  02/05/2015    INFLUENZA AGE 9 TO ADULT  08/01/2017    DTaP/Tdap/Td series (2 - Td) 12/12/2026    Pneumococcal 19-64 Medium Risk  Completed       *Patient verbalized understanding and agreement with the plan. A copy of the After Visit Summary with personalized health plan was given to the patient today. FAMILY HISTORY:   Family History   Problem Relation Age of Onset    Anemia Mother     Cancer Father      prostate    Cancer Brother     Diabetes Brother        SOCIAL HISTORY:   He  reports that he has quit smoking. He smoked 0.25 packs per day. He does not have any smokeless tobacco history on file. He  reports that he does not drink alcohol.       ROS:   General: negative for - chills, fatigue, fever, weight loss or weight gain, night sweats  HEENT: negative for - no sore throat, nasal congestion, vision problems or ear problems  Resp: negative for - cough, shortness of breath or wheezing  CV: negative for - chest pain, palpitations, orthopnea or PND,  GI: negative for - abdominal pain, change in bowel habits, constipation, diarrhea, blood or black tarry stools, or nausea/vomiting  : negative for - dysuria, hematuria, increase urgency and frequency, nocturia  Heme: negative for -excessive bleeding or bruising  Endo: negative for - polydipsia/polyuria or hot or cold intolerance  MSK: negative for - joint swelling or muscle pain,positive for joint pain,   Neuro: negative for - numbness, tingling, headache or dizziness  Derm: negative for - dry skin, hair changes, rash or skin lesion changes  Psych: negative for - anxiety, depression, irritability or mood swings, insomnia    OBJECTIVE:  PHYSICAL EXAM: Vitals:   Vitals:    05/03/17 1633 05/03/17 1659   BP: (!) 144/97 120/80   Pulse: 86    Resp: 16    Temp: 98.6 °F (37 °C)    TempSrc: Oral    SpO2: 96%    Weight: 223 lb 9.6 oz (101.4 kg)    Height: 5' 9\" (1.753 m)      Exam:   Generally: Pleasant male in no acute distress    Cardiac exam: regular, rate, and rhythm. No murmurs, gallops, or rubs. Normal S1 and S2.    Pulmonary exam: Clear to ausculation bilaterally    Abdominal exam: Positive bowel sounds in all four quadrants. Soft. Nondistended. Nontender. No hepatosplenomegaly. Extremities: 2+ dorsalis pedis bilaterally. No pedal edema bilaterally. LABS/RADIOLOGICAL TESTS:   Lab Results   Component Value Date/Time    WBC 10.2 02/11/2017 08:01 AM    HGB 13.7 02/11/2017 08:01 AM    HCT 43.2 02/11/2017 08:01 AM    PLATELET 458 16/19/2336 08:01 AM     Lab Results   Component Value Date/Time    Sodium 145 02/11/2017 08:01 AM    Potassium 3.7 02/11/2017 08:01 AM    Chloride 106 02/11/2017 08:01 AM    CO2 26 02/11/2017 08:01 AM    Glucose 289 02/11/2017 08:01 AM    BUN 11 02/11/2017 08:01 AM    Creatinine 1.59 02/11/2017 08:01 AM     No results found for: CHOL, CHOLX, CHLST, CHOLV, HDL, LDL, DLDL, LDLC, DLDLP, TGL, TGLX, TRIGL, TRIGP  No results found for: GPT   Component      Latest Ref Rng & Units 2/11/2017 2/11/2017           8:04 AM  8:01 AM   BENZODIAZEPINE      NEG   NEGATIVE    BARBITURATES      NEG   NEGATIVE    THC (TH-CANNABINOL)      NEG   NEGATIVE    OPIATES      NEG   NEGATIVE    PCP(PHENCYCLIDINE)      NEG   NEGATIVE    COCAINE      NEG   POSITIVE (A)    AMPHETAMINE      NEG   NEGATIVE    METHADONE       NEGATIVE    HDSCOM       (NOTE)    ALCOHOL(ETHYL),SERUM      0 - 3 MG/DL  3       Previous labs          ASSESSMENT/PLAN:      ICD-10-CM ICD-9-CM    1. Welcome to Medicare preventive visit Z00.00 V70.0    2. Diabetes mellitus, stable ([facility] [address] E11.9 250.00 We will see what the labs show. Continue the metformin, diet and exercise.    TSH 3RD GENERATION        MICROALBUMIN, UR, RAND W/ MICROALBUMIN/CREA RATIO      URINALYSIS W/ RFLX MICROSCOPIC      HEMOGLOBIN A1C WITH EAG      metFORMIN ER 1,000 mg tr24 REFERRAL TO OPHTHALMOLOGY   3. Dyslipidemia E78.5 272.4 We will see what the labs show. Continue the pravastatin, fish oil, diet and exercise. Pt will come back to give blood work as pt not fasting today. METABOLIC PANEL, COMPREHENSIVE      LIPID PANEL   4. Benign hypertension without CHF I10 401.1 Stable. Continue the toprol, lisinopril, diet and exercise. CBC W/O DIFF   5. Uncomplicated asthma, unspecified asthma severity J45.909 493.90 Stable. Continue the albuterol and advair   6. Encounter for vitamin deficiency screening Z13.21 V77.99 VITAMIN D, 25 HYDROXY   7. Chronic pain of both knees M25.561 719.46 REFERRAL TO PAIN MANAGEMENT    M25.562 338.29     G89.29     8. Pain managment G89.29 338.29 REFERRAL TO PAIN MANAGEMENT   9. Requested Prescriptions     Signed Prescriptions Disp Refills    metFORMIN ER 1,000 mg tr24 180 Tab 3     Sig: TAKE 1 TABLET BY MOUTH 2 TIMES A DAY       10. Patient verbalized understanding and agreement with the plan. 11. Patient was given an after visit summary. 12.   Follow-up Disposition:  Return in about 3 months (around 8/3/2017) for f/u DM/HTN. or sooner if worsening symptoms.       Roberto Macedo MD

## 2017-05-03 NOTE — PATIENT INSTRUCTIONS
1) follow-up in 3 months or sooner if worsening symptoms. Advance Care Planning: Care Instructions  Your Care Instructions  It can be hard to live with an illness that cannot be cured. But if your health is getting worse, you may want to make decisions about end-of-life care. Planning for the end of your life does not mean that you are giving up. It is a way to make sure that your wishes are met. Clearly stating your wishes can make it easier for your loved ones. Making plans while you are still able may also ease your mind and make your final days less stressful and more meaningful. Follow-up care is a key part of your treatment and safety. Be sure to make and go to all appointments, and call your doctor if you are having problems. It's also a good idea to know your test results and keep a list of the medicines you take. What can you do to plan for the end of life? · You can bring these issues up with your doctor. You do not need to wait until your doctor starts the conversation. You might start with \"I would not be willing to live with . Ortiz Hilliard \" When you complete this sentence it helps your doctor understand your wishes. · Talk openly and honestly with your doctor. This is the best way to understand the decisions you will need to make as your health changes. Know that you can always change your mind. · Ask your doctor about commonly used life-support measures. These include tube feedings, breathing machines, and fluids given through a vein (IV). Understanding these treatments will help you decide whether you want them. · You may choose to have these life-supporting treatments for a limited time. This allows a trial period to see whether they will help you. You may also decide that you want your doctor to take only certain measures to keep you alive. It is important to spell out these conditions so that your doctor and family understand them. · Talk to your doctor about how long you are likely to live.  Man or she may be able to give you an idea of what usually happens with your specific illness. · Think about preparing papers that state your wishes. This way there will not be any confusion about what you want. You can change your instructions at any time. Which papers should you prepare? Advance directives are legal papers that tell doctors how you want to be cared for at the end of your life. You do not need a  to write these papers. Ask your doctor or your state health department for information on how to write your advance directives. They may have the forms for each of these types of papers. Make sure your doctor has a copy of these on file, and give a copy to a family member or close friend. · Consider a do-not-resuscitate order (DNR). This order asks that no extra treatments be done if your heart stops or you stop breathing. Extra treatments may include cardiopulmonary resuscitation (CPR), electrical shock to restart your heart, or a machine to breathe for you. If you decide to have a DNR order, ask your doctor to explain and write it. Place the order in your home where everyone can easily see it. · Consider a living will. A living will explains your wishes about life support and other treatments at the end of your life if you become unable to speak for yourself. Living lea tell doctors to use or not use treatments that would keep you alive. You must have one or two witnesses or a notary present when you sign this form. · Consider a durable power of  for health care. This allows you to name a person to make decisions about your care if you are not able to. Most people ask a close friend or family member. Talk to this person about the kinds of treatments you want and those that you do not want. Make sure this person understands your wishes. These legal papers are simple to change. Tell your doctor what you want to change, and ask him or her to make a note in your medical file.  Give your family updated copies of the papers. Where can you learn more? Go to http://carlos a-fernando.info/. Enter P184 in the search box to learn more about \"Advance Care Planning: Care Instructions. \"  Current as of: November 17, 2016  Content Version: 11.2  © 7310-0362 [employer]. Care instructions adapted under license by [employer] (which disclaims liability or warranty for this information). If you have questions about a medical condition or this instruction, always ask your healthcare professional. [address] any warranty or liability for your use of this information. Learning About Durable Power of  for Health Care  What is a durable power of  for health care? A durable power of  for health care is one type of the legal forms called advance directives. It lets you decide who you want to make treatment decisions for you if you cannot speak or decide for yourself. The person you choose is called your health care agent. Another type of advance directive is a living will. It lets you write down what kinds of treatment or life support you want or do not want. What should you think about when choosing a health care agent? Choose your health care agent carefully. This person may or may not be a family member. Talk to the person before you make your final decision. Make sure he or she is comfortable with this responsibility. It's a good idea to choose someone who:  · Is at least 25years old. · Knows you well and understands what makes life meaningful for you. · Understands your Cheondoism and moral values. · Will do what you want, not what he or she wants. · Will be able to make difficult choices at a stressful time. · Will be able to refuse or stop treatment, if that is what you would want, even if you could die. · Will be firm and confident with health professionals if needed.   · Will ask questions to get necessary information. · Lives near you or agrees to travel to you if needed. Your family may help you make medical decisions while you can still be part of that process. But it is important to choose one person to be your health care agent in case you are not able to make decisions for yourself. If you don't fill out the legal form and name a health care agent, the decisions your family can make may be limited. Who will make decisions for you if you do not have a health care agent? If you don't have a health care agent or a living will, your family members may disagree about your medical care. And then some medical professionals who may not know you as well might have to make decisions for you. In some cases, a  makes the decisions. When you name a health care agent, it is very clear who has the power to make health decisions for you. How do you name a health care agent? You name your health care agent on a legal form. It is usually called a durable power of  for health care. Ask your hospital, state bar association, or office on aging where to find these forms. You must sign the form to make it legal. Some states require you to get the form notarized. This means that a person called a  watches you sign the form and then he or she signs the form. Some states also require that two or more witnesses sign the form. Be sure to tell your family members and doctors who your health care agent is. Keep your forms in a safe place. But make sure that your loved ones know where the forms are. This could be in your desk where you keep other important papers. Make sure your doctor has a copy of your forms. Where can you learn more? Go to http://carlos a-fernando.info/. Enter [id number] in the search box to learn more about \"Learning About Durable Power of  for [facility]. \"  Current as of: November 17, 2016  Content Version: 11.2  © 7962-4749 [employer], [facility].  Care instructions adapted under license by [employer] (which disclaims liability or warranty for this information). If you have questions about a medical condition or this instruction, always ask your healthcare professional. [address] any warranty or liability for your use of this information. Deciding About Life-Prolonging Treatment  What is life-prolonging treatment? There are many kinds of treatment that can help you live longer. These may be needed for only a short time until your illness improves. Or you may use them over the long term to help keep you alive. Some treatments include the use of:  · Medicines to slow the progress of certain diseases, such as heart disease, diabetes, cancer, AIDS, or Alzheimer's disease. · Antibiotics to treat serious infections, such as pneumonia. · Dialysis to clean your blood if your kidneys stop working. · A breathing machine to help you breathe if you can't breathe on your own. This machine pumps air into your lungs through a tube put into your throat. · A feeding tube or an intravenous (IV) line to give you food and fluids if you can't eat or drink. · Cardiopulmonary resuscitation (CPR) to try to restart your heart. The decision to receive treatments that may help you live longer is a personal one. You may want your doctor to do everything possible to keep you alive, even when your chance for recovery is small. Or you may choose to only have care to manage your pain and other symptoms. What are key points about this decision? · If there is a good chance that your illness can be cured or managed, your doctor may advise you to first try available treatments. If these don't work, then you might think about stopping treatment. · If you stop treatment, you will still receive care that focuses on pain relief and comfort. · A decision to stop treatment that keeps you alive does not have to be permanent.  You can always change your mind if your health starts to improve. · Even though treatment focuses on helping you live longer, it may cause side effects that can greatly affect your quality of life. And it could affect how you spend time with your family and friends. · If you still have personal goals that you want to pursue, you may want treatment that keeps you alive long enough to reach them. Why might you choose life-prolonging treatment? · There is a good chance that your illness can be cured or managed. · You think you can manage the possible side effects of treatment. · You don't think treatment will get in the way of your quality of life. · You have personal goals that you still want to pursue and achieve. Why might you choose to stop life-prolonging treatment? · Your chance of surviving your illness is very low. · You have tried all possible treatments for your illness, but they have not helped. · You can no longer deal with the side effects of treatment. · You have already met the goals you set out to achieve in your life. Your decision  Thinking about the facts and your feelings can help you make a decision that is right for you. Be sure you understand the benefits and risks of your options, and think about what else you need to do before you make the decision. Where can you learn more? Go to http://carlos a-fernando.info/. Enter Y431 in the search box to learn more about \"Deciding About Life-Prolonging Treatment. \"  Current as of: February 24, 2016  Content Version: 11.2  © 0685-9792 [employer]. Care instructions adapted under license by [employer] (which disclaims liability or warranty for this information). If you have questions about a medical condition or this instruction, always ask your healthcare professional. [address] any warranty or liability for your use of this information. Learning About Living [place]  What is a living will?   A living will is a legal form you use to write down the kind of care you want at the end of your life. It is used by the health professionals who will treat you if you aren't able to decide for yourself. If you put your wishes in writing, your loved ones and others will know what kind of care you want. They won't need to guess. This can ease your mind and be helpful to others. A living will is not the same as an estate or property will. An estate will explains what you want to happen with your money and property after you die. Is a living will a legal document? A living will is a legal document. Each state has its own laws about living lea. If you move to another state, make sure that your living will is legal in the state where you now live. Or you might use a universal form that has been approved by many states. This kind of form can sometimes be completed and stored online. Your electronic copy will then be available wherever you have a connection to the Internet. In most cases, doctors will respect your wishes even if you have a form from a different state. · You don't need an  to complete a living will. But legal advice can be helpful if your state's laws are unclear, your health history is complicated, or your family can't agree on what should be in your living will. · You can change your living will at any time. Some people find that their wishes about end-of-life care change as their health changes. · In addition to making a living will, think about completing a medical power of  form. This form lets you name the person you want to make end-of-life treatment decisions for you (your \"health care agent\") if you're not able to. Many hospitals and nursing homes will give you the forms you need to complete a living will and a medical power of . · Your living will is used only if you can't make or communicate decisions for yourself anymore.  If you become able to make decisions again, you can accept or refuse any treatment, no matter what you wrote in your living will. · Your state may offer an online registry. This is a place where you can store your living will online so the doctors and nurses who need to treat you can find it right away. What should you think about when creating a living will? Talk about your end-of-life wishes with your family members and your doctor. Let them know what you want. That way the people making decisions for you won't be surprised by your choices. Think about these questions as you make your living will:  · Do you know enough about life support methods that might be used? If not, talk to your doctor so you know what might be done if you can't breathe on your own, your heart stops, or you're unable to swallow. · What things would you still want to be able to do after you receive life-support methods? Would you want to be able to walk? To speak? To eat on your own? To live without the help of machines? · If you have a choice, where do you want to be cared for? In your home? At a hospital or nursing home? · Do you want certain Congregation practices performed if you become very ill? · If you have a choice at the end of your life, where would you prefer to die? At home? In a hospital or nursing home? Somewhere else? · Would you prefer to be buried or cremated? · Do you want your organs to be donated after you die? What should you do with your living will? · Make sure that your family members and your health care agent have copies of your living will. · Give your doctor a copy of your living will to keep in your medical record. If you have more than one doctor, make sure that each one has a copy. · You may want to put a copy of your living will where it can be easily found. Where can you learn more? Go to http://carlos a-fernando.info/. Enter P521 in the search box to learn more about \"Learning About Living Khushi Arguelles. \"  Current as of: February 24, 2016  Content Version: 11.2  © 3731-9061 [employer]. Care instructions adapted under license by [email or web address] (which disclaims liability or warranty for this information). If you have questions about a medical condition or this instruction, always ask your healthcare professional. [address] any warranty or liability for your use of this information. Advance Directives: Care Instructions  Your Care Instructions  An advance directive is a legal way to state your wishes at the end of your life. It tells your family and your doctor what to do if you can no longer say what you want. There are two main types of advance directives. You can change them any time that your wishes change. · A living will tells your family and your doctor your wishes about life support and other treatment. · A durable power of  for health care lets you name a person to make treatment decisions for you when you can't speak for yourself. This person is called a health care agent. If you do not have an advance directive, decisions about your medical care may be made by a doctor or a  who doesn't know you. It may help to think of an advance directive as a gift to the people who care for you. If you have one, they won't have to make tough decisions by themselves. Follow-up care is a key part of your treatment and safety. Be sure to make and go to all appointments, and call your doctor if you are having problems. It's also a good idea to know your test results and keep a list of the medicines you take. How can you care for yourself at home? · Discuss your wishes with your loved ones and your doctor. This way, there are no surprises. · Many states have a unique form. Or you might use a universal form that has been approved by many states. This kind of form can sometimes be completed and stored online.  Your electronic copy will then be available wherever you have a connection to the Internet. In most cases, doctors will respect your wishes even if you have a form from a different state. · You don't need a  to do an advance directive. But you may want to get legal advice. · Think about these questions when you prepare an advance directive:  ¨ Who do you want to make decisions about your medical care if you are not able to? Many people choose a family member or close friend. ¨ Do you know enough about life support methods that might be used? If not, talk to your doctor so you understand. ¨ What are you most afraid of that might happen? You might be afraid of having pain, losing your independence, or being kept alive by machines. ¨ Where would you prefer to die? Choices include your home, a hospital, or a nursing home. ¨ Would you like to have information about hospice care to support you and your family? ¨ Do you want to donate organs when you die? ¨ Do you want certain Mormonism practices performed before you die? If so, put your wishes in the advance directive. · Read your advance directive every year, and make changes as needed. When should you call for help? Be sure to contact your doctor if you have any questions. Where can you learn more? Go to http://carlos a-fernando.info/. Enter R264 in the search box to learn more about \"Advance Directives: Care Instructions. \"  Current as of: November 17, 2016  Content Version: 11.2  © 1980-6280 [employer]. Care instructions adapted under license by [employer] (which disclaims liability or warranty for this information). If you have questions about a medical condition or this instruction, always ask your healthcare professional. [address] any warranty or liability for your use of this information.

## 2017-05-03 NOTE — MR AVS SNAPSHOT
Visit Information Date & Time Provider Department Dept. Phone Encounter #  
 5/3/2017  4:30 PM Tony Aiken MD [facility] [phone] [id number] Follow-up Instructions Return in about 3 months (around 8/3/2017) for f/u DM/HTN. Upcoming Health Maintenance Date Due  
 LIPID PANEL Q1 1969 FOOT EXAM Q1 2/5/1979 MICROALBUMIN Q1 2/5/1979 EYE EXAM RETINAL OR DILATED Q1 2/5/1979 HEMOGLOBIN A1C Q6M 2/5/2015 INFLUENZA AGE 9 TO ADULT 8/1/2017 DTaP/Tdap/Td series (2 - Td) 12/12/2026 Allergies as of 5/3/2017  Review Complete On: 5/3/2017 By: Juanita So MD  
  
 Severity Noted Reaction Type Reactions Iodine High 04/01/2014    Anaphylaxis Norco [Hydrocodone-acetaminophen] High 12/12/2016    Nausea and Vomiting Gi distress Shellfish Derived High 07/31/2014    Swelling, Angioedema Other reaction(s): anaphylaxis/angioedema [address] AND CRAB Current Immunizations  Never Reviewed Name Date Influenza Vaccine 10/27/2014, 9/1/2014 Pneumococcal Polysaccharide (PPSV-23) 11/4/2014 12:17 PM, 10/31/2013 Not reviewed this visit You Were Diagnosed With   
  
 Codes Comments Welcome to Medicare preventive visit    -  Primary ICD-10-CM: Z00.00 ICD-9-CM: V70.0 Diabetes mellitus, stable ([facility] [address]     ICD-10-CM: E11.9 ICD-9-CM: 250.00 Dyslipidemia     ICD-10-CM: E78.5 ICD-9-CM: 272.4 Benign hypertension without CHF     ICD-10-CM: I10 
ICD-9-CM: 401.1 Uncomplicated asthma, unspecified asthma severity     ICD-10-CM: J45.909 ICD-9-CM: 493.90 Encounter for vitamin deficiency screening     ICD-10-CM: Z13.21 ICD-9-CM: V77.99 Chronic pain of both knees     ICD-10-CM: M25.561, M25.562, G89.29 ICD-9-CM: 719.46, 338.29 Other chronic pain     ICD-10-CM: G89.29 ICD-9-CM: 338.29 Vitals BP Pulse Temp Resp Height(growth percentile) Weight(growth percentile) (!) 144/97 (BP 1 Location: Left arm, BP Patient Position: Sitting) 86 98.6 °F (37 °C) (Oral) 16 5' 9\" (1.753 m) 223 lb 9.6 oz (101.4 kg) SpO2 BMI Smoking Status 96% 33.02 kg/m2 Former Smoker Vitals History BMI and BSA Data Body Mass Index Body Surface Area 33.02 kg/m 2 2.22 m 2 Preferred Pharmacy Pharmacy Name Phone [address] [place] [address]. [facility] [id number] [address] [facility] [address] [facility] [address] [phone] Your Updated Medication List  
  
   
This list is accurate as of: 5/3/17  4:56 PM.  Always use your most recent med list.  
  
  
  
  
 ADVAIR DISKUS 500-50 mcg/dose diskus inhaler Generic drug:  fluticasone-salmeterol Take 1 Puff by inhalation every twelve (12) hours. albuterol 90 mcg/actuation inhaler Commonly known as:  PROVENTIL HFA, VENTOLIN HFA, PROAIR HFA  
1-2 Puffs. ASPIRIN PO Take 81 mg by mouth. docusate sodium 100 mg capsule Commonly known as:  Greer Bakari Take one po daily prn constipation DULoxetine 30 mg capsule Commonly known as:  CYMBALTA TAKE 1 CAPSULE BY MOUTH EVERY MORNING  
  
 FISH OIL 60- mg Cap Generic drug:  omega 3-dha-epa-fish oil Take 500 mg by mouth daily. lidocaine 5 % ointment Commonly known as:  XYLOCAINE  
APPLY THIN FILM RUB EXTERNALLY TWICE DAILY IN THE MORNING AND EVENING  
  
 lisinopril 20 mg tablet Commonly known as:  PRINIVIL, ZESTRIL  
TAKE 1 TABLET BY MOUTH EVERY DAY AS DIRECTED  
  
 loratadine-pseudoephedrine  mg per tablet Commonly known as:  CLARITIN-D 24-hour Take 1 Tab by Mouth Once a Day. metFORMIN ER 1,000 mg Tr24 TAKE 1 TABLET BY MOUTH 2 TIMES A DAY  
  
 metoprolol succinate 25 mg XL tablet Commonly known as:  TOPROL-XL Take 1 Tab by mouth daily. montelukast 10 mg tablet Commonly known as:  SINGULAIR Take 10 mg by mouth daily. NITROSTAT 0.4 mg SL tablet Generic drug:  nitroglycerin 0.4 mg by SubLINGual route every five (5) minutes as needed for Chest Pain. omeprazole 40 mg capsule Commonly known as:  PRILOSEC  
TAKE 1 CAPSULE BY MOUTH 2 TIMES A DAY  
  
 oxyCODONE-acetaminophen 5-325 mg per tablet Commonly known as:  PERCOCET Take 1 Tab by mouth every eight (8) hours as needed for Pain. Max Daily Amount: 3 Tabs. pravastatin 20 mg tablet Commonly known as:  PRAVACHOL Take 20 mg by mouth nightly. tiotropium 18 mcg inhalation capsule Commonly known as:  Shimon Chong Take 1 Cap by inhalation daily. Prescriptions Sent to Pharmacy Refills  
 metFORMIN ER 1,000 mg tr24 3 Sig: TAKE 1 TABLET BY MOUTH 2 TIMES A DAY Class: Normal  
 Pharmacy: [address] [facility], [facility]. [address] #: [phone] We Performed the Following REFERRAL TO OPHTHALMOLOGY [REF57 Custom] Comments:  
 Please evaluate patient for  DM2 eye exam in 1-2 weeks. REFERRAL TO PAIN MANAGEMENT [[id number] Custom] Comments:  
 Please evaluate patient for chronic pain in both knees in 1 week. Pt will make own appt. Follow-up Instructions Return in about 3 months (around 8/3/2017) for f/u DM/HTN. To-Do List   
 05/03/2017 Lab:  CBC W/O DIFF   
  
 05/03/2017 Lab:  LIPID PANEL   
  
 05/03/2017 Lab:  METABOLIC PANEL, COMPREHENSIVE   
  
 05/03/2017 Lab:  TSH 3RD GENERATION   
  
 06/03/2017 Lab:  HEMOGLOBIN A1C WITH EAG   
  
 06/03/2017 Lab:  MICROALBUMIN, UR, RAND W/ MICROALBUMIN/CREA RATIO   
  
 06/03/2017 Lab:  URINALYSIS W/ RFLX MICROSCOPIC   
  
 06/03/2017 Lab:  VITAMIN D, 25 HYDROXY Referral Information Referral ID Referred By Referred To  
  
 [id number] [address] MD [place]   
   [address] Phone: [phone] Fax: [phone] Visits Status Start Date End Date 1 New Request 5/3/17 5/3/18 If your referral has a status of pending review or denied, additional information will be sent to support the outcome of this decision. Referral ID Referred By Referred To  
 [id number] JACKIE [address] [address] [place] Leo Phone: [phone] Fax: [phone] Visits Status Start Date End Date 1 New Request 5/3/17 5/3/18 If your referral has a status of pending review or denied, additional information will be sent to support the outcome of this decision. Patient Instructions 1) follow-up in 3 months or sooner if worsening symptoms. Advance Care Planning: Care Instructions Your Care Instructions It can be hard to live with an illness that cannot be cured. But if your health is getting worse, you may want to make decisions about end-of-life care. Planning for the end of your life does not mean that you are giving up. It is a way to make sure that your wishes are met. Clearly stating your wishes can make it easier for your loved ones. Making plans while you are still able may also ease your mind and make your final days less stressful and more meaningful. Follow-up care is a key part of your treatment and safety. Be sure to make and go to all appointments, and call your doctor if you are having problems. It's also a good idea to know your test results and keep a list of the medicines you take. What can you do to plan for the end of life? · You can bring these issues up with your doctor. You do not need to wait until your doctor starts the conversation. You might start with \"I would not be willing to live with . Lova Mean Lova Mean Lova Mean \" When you complete this sentence it helps your doctor understand your wishes. · Talk openly and honestly with your doctor. This is the best way to understand the decisions you will need to make as your health changes. Know that you can always change your mind. · Ask your doctor about commonly used life-support measures. These include tube feedings, breathing machines, and fluids given through a vein (IV). Understanding these treatments will help you decide whether you want them. · You may choose to have these life-supporting treatments for a limited time. This allows a trial period to see whether they will help you. You may also decide that you want your doctor to take only certain measures to keep you alive. It is important to spell out these conditions so that your doctor and family understand them. · Talk to your doctor about how long you are likely to live. He or she may be able to give you an idea of what usually happens with your specific illness. · Think about preparing papers that state your wishes. This way there will not be any confusion about what you want. You can change your instructions at any time. Which papers should you prepare? Advance directives are legal papers that tell doctors how you want to be cared for at the end of your life. You do not need a  to write these papers. Ask your doctor or your state [facility] department for information on how to write your advance directives. They may have the forms for each of these types of papers. Make sure your doctor has a copy of these on file, and give a copy to a family member or close friend. · Consider a do-not-resuscitate order (DNR). This order asks that no extra treatments be done if your heart stops or you stop breathing. Extra treatments may include cardiopulmonary resuscitation (CPR), electrical shock to restart your heart, or a machine to breathe for you. If you decide to have a DNR order, ask your doctor to explain and write it. Place the order in your home where everyone can easily see it. · Consider a living will.  A living will explains your wishes about life support and other treatments at the end of your life if you become unable to speak for yourself. Living lea tell doctors to use or not use treatments that would keep you alive. You must have one or two witnesses or a notary present when you sign this form. · Consider a durable power of  for health care. This allows you to name a person to make decisions about your care if you are not able to. Most people ask a close friend or family member. Talk to this person about the kinds of treatments you want and those that you do not want. Make sure this person understands your wishes. These legal papers are simple to change. Tell your doctor what you want to change, and ask him or her to make a note in your medical file. Give your family updated copies of the papers. Where can you learn more? Go to http://carlos a-fernando.info/. Enter P184 in the search box to learn more about \"Advance Care Planning: Care Instructions. \" Current as of: November 17, 2016 Content Version: 11.2 © 6436-1805 [employer]. Care instructions adapted under license by [employer] (which disclaims liability or warranty for this information). If you have questions about a medical condition or this instruction, always ask your healthcare professional. [address] any warranty or liability for your use of this information. Learning About Durable Power of  for Health Care What is a durable power of  for health care? A durable power of  for health care is one type of the legal forms called advance directives. It lets you decide who you want to make treatment decisions for you if you cannot speak or decide for yourself. The person you choose is called your health care agent. Another type of advance directive is a living will. It lets you write down what kinds of treatment or life support you want or do not want. What should you think about when choosing a health care agent? Choose your health care agent carefully. This person may or may not be a family member. Talk to the person before you make your final decision. Make sure he or she is comfortable with this responsibility. It's a good idea to choose someone who: · Is at least 25years old. · Knows you well and understands what makes life meaningful for you. · Understands your Zoroastrian and moral values. · Will do what you want, not what he or she wants. · Will be able to make difficult choices at a stressful time. · Will be able to refuse or stop treatment, if that is what you would want, even if you could die. · Will be firm and confident with health professionals if needed. · Will ask questions to get necessary information. · Lives near you or agrees to travel to you if needed. Your family may help you make medical decisions while you can still be part of that process. But it is important to choose one person to be your health care agent in case you are not able to make decisions for yourself. If you don't fill out the legal form and name a health care agent, the decisions your family can make may be limited. Who will make decisions for you if you do not have a health care agent? If you don't have a health care agent or a living will, your family members may disagree about your medical care. And then some medical professionals who may not know you as well might have to make decisions for you. In some cases, a  makes the decisions. When you name a health care agent, it is very clear who has the power to make health decisions for you. How do you name a health care agent? You name your health care agent on a legal form. It is usually called a durable power of  for health care. Ask your hospital, state bar association, or office on aging where to find these forms. You must sign the form to make it legal. Some states require you to get the form notarized.  This means that a person called a  watches you sign the form and then he or she signs the form. Some states also require that two or more witnesses sign the form. Be sure to tell your family members and doctors who your health care agent is. Keep your forms in a safe place. But make sure that your loved ones know where the forms are. This could be in your desk where you keep other important papers. Make sure your doctor has a copy of your forms. Where can you learn more? Go to http://carlos a-fernando.info/. Enter [id number] in the search box to learn more about \"Learning About Durable Power of  for [facility]. \" Current as of: November 17, 2016 Content Version: 11.2 © 8584-4313 [employer]. Care instructions adapted under license by [employer] (which disclaims liability or warranty for this information). If you have questions about a medical condition or this instruction, always ask your healthcare professional. [place] [address] any warranty or liability for your use of this information. Deciding About Life-Prolonging Treatment What is life-prolonging treatment? There are many kinds of treatment that can help you live longer. These may be needed for only a short time until your illness improves. Or you may use them over the long term to help keep you alive. Some treatments include the use of: · Medicines to slow the progress of certain diseases, such as heart disease, diabetes, cancer, AIDS, or Alzheimer's disease. · Antibiotics to treat serious infections, such as pneumonia. · Dialysis to clean your blood if your kidneys stop working. · A breathing machine to help you breathe if you can't breathe on your own. This machine pumps air into your lungs through a tube put into your throat. · A feeding tube or an intravenous (IV) line to give you food and fluids if you can't eat or drink. · Cardiopulmonary resuscitation (CPR) to try to restart your heart. The decision to receive treatments that may help you live longer is a personal one. You may want your doctor to do everything possible to keep you alive, even when your chance for recovery is small. Or you may choose to only have care to manage your pain and other symptoms. What are key points about this decision? · If there is a good chance that your illness can be cured or managed, your doctor may advise you to first try available treatments. If these don't work, then you might think about stopping treatment. · If you stop treatment, you will still receive care that focuses on pain relief and comfort. · A decision to stop treatment that keeps you alive does not have to be permanent. You can always change your mind if your health starts to improve. · Even though treatment focuses on helping you live longer, it may cause side effects that can greatly affect your quality of life. And it could affect how you spend time with your family and friends. · If you still have personal goals that you want to pursue, you may want treatment that keeps you alive long enough to reach them. Why might you choose life-prolonging treatment? · There is a good chance that your illness can be cured or managed. · You think you can manage the possible side effects of treatment. · You don't think treatment will get in the way of your quality of life. · You have personal goals that you still want to pursue and achieve. Why might you choose to stop life-prolonging treatment? · Your chance of surviving your illness is very low. · You have tried all possible treatments for your illness, but they have not helped. · You can no longer deal with the side effects of treatment. · You have already met the goals you set out to achieve in your life. Your decision Thinking about the facts and your feelings can help you make a decision that is right for you.  Be sure you understand the benefits and risks of your options, and think about what else you need to do before you make the decision. Where can you learn more? Go to http://carlos a-fernando.info/. Enter D585 in the search box to learn more about \"Deciding About Life-Prolonging Treatment. \" Current as of: February 24, 2016 Content Version: 11.2 © 1790-9140 [employer]. Care instructions adapted under license by [employer] (which disclaims liability or warranty for this information). If you have questions about a medical condition or this instruction, always ask your healthcare professional. [address] any warranty or liability for your use of this information. [address] What is a living will? A living will is a legal form you use to write down the kind of care you want at the end of your life. It is used by the health professionals who will treat you if you aren't able to decide for yourself. If you put your wishes in writing, your loved ones and others will know what kind of care you want. They won't need to guess. This can ease your mind and be helpful to others. A living will is not the same as an estate or property will. An estate will explains what you want to happen with your money and property after you die. Is a living will a legal document? A living will is a legal document. Each state has its own laws about living lea. If you move to another state, make sure that your living will is legal in the state where you now live. Or you might use a universal form that has been approved by many states. This kind of form can sometimes be completed and stored online. Your electronic copy will then be available wherever you have a connection to the Internet. In most cases, doctors will respect your wishes even if you have a form from a different state. · You don't need an  to complete a living will.  But legal advice can be helpful if your state's laws are unclear, your health history is complicated, or your family can't agree on what should be in your living will. · You can change your living will at any time. Some people find that their wishes about end-of-life care change as their health changes. · In addition to making a living will, think about completing a medical power of  form. This form lets you name the person you want to make end-of-life treatment decisions for you (your \"health care agent\") if you're not able to. Many hospitals and nursing homes will give you the forms you need to complete a living will and a medical power of . · Your living will is used only if you can't make or communicate decisions for yourself anymore. If you become able to make decisions again, you can accept or refuse any treatment, no matter what you wrote in your living will. · Your state may offer an online registry. This is a place where you can store your living will online so the doctors and nurses who need to treat you can find it right away. What should you think about when creating a living will? Talk about your end-of-life wishes with your family members and your doctor. Let them know what you want. That way the people making decisions for you won't be surprised by your choices. Think about these questions as you make your living will: · Do you know enough about life support methods that might be used? If not, talk to your doctor so you know what might be done if you can't breathe on your own, your heart stops, or you're unable to swallow. · What things would you still want to be able to do after you receive life-support methods? Would you want to be able to walk? To speak? To eat on your own? To live without the help of machines? · If you have a choice, where do you want to be cared for? In your home? At a hospital or nursing home? · Do you want certain Pentecostal practices performed if you become very ill? · If you have a choice at the end of your life, where would you prefer to die? At home? In a hospital or nursing home? Somewhere else? · Would you prefer to be buried or cremated? · Do you want your organs to be donated after you die? What should you do with your living will? · Make sure that your family members and your health care agent have copies of your living will. · Give your doctor a copy of your living will to keep in your medical record. If you have more than one doctor, make sure that each one has a copy. · You may want to put a copy of your living will where it can be easily found. Where can you learn more? Go to http://carlos a-fernando.info/. Enter T375 in the search box to learn more about \"Learning About Living Luis Fernando Flavin. \" Current as of: February 24, 2016 Content Version: 11.2 © 2176-4794 [employer]. Care instructions adapted under license by [employer] (which disclaims liability or warranty for this information). If you have questions about a medical condition or this instruction, always ask your healthcare professional. [place] [address] any warranty or liability for your use of this information. Advance Directives: Care Instructions Your Care Instructions An advance directive is a legal way to state your wishes at the end of your life. It tells your family and your doctor what to do if you can no longer say what you want. There are two main types of advance directives. You can change them any time that your wishes change. · A living will tells your family and your doctor your wishes about life support and other treatment. · A durable power of  for health care lets you name a person to make treatment decisions for you when you can't speak for yourself. This person is called a health care agent.  
If you do not have an advance directive, decisions about your medical care may be made by a doctor or a  who doesn't know you. It may help to think of an advance directive as a gift to the people who care for you. If you have one, they won't have to make tough decisions by themselves. Follow-up care is a key part of your treatment and safety. Be sure to make and go to all appointments, and call your doctor if you are having problems. It's also a good idea to know your test results and keep a list of the medicines you take. How can you care for yourself at home? · Discuss your wishes with your loved ones and your doctor. This way, there are no surprises. · Many states have a unique form. Or you might use a universal form that has been approved by many states. This kind of form can sometimes be completed and stored online. Your electronic copy will then be available wherever you have a connection to the Internet. In most cases, doctors will respect your wishes even if you have a form from a different state. · You don't need a  to do an advance directive. But you may want to get legal advice. · Think about these questions when you prepare an advance directive: ¨ Who do you want to make decisions about your medical care if you are not able to? Many people choose a family member or close friend. ¨ Do you know enough about life support methods that might be used? If not, talk to your doctor so you understand. ¨ What are you most afraid of that might happen? You might be afraid of having pain, losing your independence, or being kept alive by machines. ¨ Where would you prefer to die? Choices include your home, a hospital, or a nursing home. ¨ Would you like to have information about hospice care to support you and your family? ¨ Do you want to donate organs when you die? ¨ Do you want certain Religion practices performed before you die? If so, put your wishes in the advance directive. · Read your advance directive every year, and make changes as needed. When should you call for help? Be sure to contact your doctor if you have any questions. Where can you learn more? Go to [email or web address] Enter R264 in the search box to learn more about \"Advance Directives: Care Instructions. \" Current as of: November 17, 2016 Content Version: 11.2 © 4237-6891 [employer]. Care instructions adapted under license by [employer] (which disclaims liability or warranty for this information). If you have questions about a medical condition or this instruction, always ask your healthcare professional. [address] any warranty or liability for your use of this information. Introducing [facility]! Dear Eyal Henry: Thank you for requesting a [email or web address] account. Our records indicate that you already have an active [email or web address] account. You can access your account anytime at [email or web address] Did you know that you can access your hospital and ER discharge instructions at any time in [email or web address]? You can also review all of your test results from your hospital stay or ER visit. Additional Information If you have questions, please visit the Frequently Asked Questions section of the [email or web address] website at [email or web address] Remember, [email or web address] is NOT to be used for urgent needs. For medical emergencies, dial 911. Now available from your iPhone and Android! Please provide this summary of care documentation to your next provider. Your primary care clinician is listed as Tony Howard. If you have any questions after today's visit, please call [phone].

## 2017-05-03 NOTE — PROGRESS NOTES
Chief Complaint   Patient presents with    Annual Wellness Visit    Medication Refill         HPI:     Filomena Loya is a 49 y.o.  male with history of  CAD and type 2 DM here for the above complaint. Sugars are ranging from 125-130. He wanted referral to pain management. He also wanted pain medications. Told him UDS on 2/2017 positive for cocaine. He said that was then. Told him cannot give him pain medications because of this history of cocaine abuse. Past Medical History:   Diagnosis Date    Advance directive discussed with patient 05/03/2017    Arthritis     left knee    Asthma     CAD (coronary artery disease)     heart attack 6/2012, Being followed by Dr. Strong Score Cocaine use 2017    Depression     Diabetes ([facility] [address]     GERD (gastroesophageal reflux disease)     Hypertension     Psychiatric disorder     depression and schziophenia    Schizophrenia ([facility] [address]     Will be seeing [facility]    Unspecified sleep apnea     cpap machine,Will be seeing neurologist       Past Surgical History:   Procedure Laterality Date    ABDOMEN SURGERY PROC UNLISTED      HX HEART CATHETERIZATION      cardiac cath     HX HERNIA REPAIR      HX KNEE REPLACEMENT      left TKR    HX ORTHOPAEDIC      L knee 1987, 1996    HX OTHER SURGICAL      eye sx at 2 yrs old           MEDICATION ALLERGIES/INTOLERANCES:   Allergies   Allergen Reactions    Iodine Anaphylaxis    Norco [Hydrocodone-Acetaminophen] Nausea and Vomiting     Gi distress    Shellfish Derived Swelling and Angioedema     Other reaction(s): anaphylaxis/angioedema  SHRIMP [employer] CRAB             CURRENT MEDICATIONS:    Current Outpatient Prescriptions   Medication Sig    metFORMIN ER 1,000 mg tr24 TAKE 1 TABLET BY MOUTH 2 TIMES A DAY    metoprolol succinate (TOPROL-XL) 25 mg XL tablet Take 1 Tab by mouth daily.     oxyCODONE-acetaminophen (PERCOCET) 5-325 mg per tablet Take 1 Tab by mouth every eight (8) hours as needed for Pain. Max Daily Amount: 3 Tabs.  DULoxetine (CYMBALTA) 30 mg capsule TAKE 1 CAPSULE BY MOUTH EVERY MORNING    lidocaine (XYLOCAINE) 5 % ointment APPLY THIN FILM RUB EXTERNALLY TWICE DAILY IN THE MORNING AND EVENING    lisinopril (PRINIVIL, ZESTRIL) 20 mg tablet TAKE 1 TABLET BY MOUTH EVERY DAY AS DIRECTED    omeprazole (PRILOSEC) 40 mg capsule TAKE 1 CAPSULE BY MOUTH 2 TIMES A DAY    albuterol (PROVENTIL HFA, VENTOLIN HFA, PROAIR HFA) 90 mcg/actuation inhaler 1-2 Puffs.  loratadine-pseudoephedrine (CLARITIN-D 24-HOUR)  mg per tablet Take 1 Tab by Mouth Once a Day.  omega 3-dha-epa-fish oil (FISH OIL) 60- mg cap Take 500 mg by mouth daily.  pravastatin (PRAVACHOL) 20 mg tablet Take 20 mg by mouth nightly.  tiotropium (SPIRIVA) 18 mcg inhalation capsule Take 1 Cap by inhalation daily.  ASPIRIN PO Take 81 mg by mouth.  docusate sodium (COLACE) 100 mg capsule Take one po daily prn constipation    montelukast (SINGULAIR) 10 mg tablet Take 10 mg by mouth daily.  fluticasone-salmeterol (ADVAIR DISKUS) 500-50 mcg/dose diskus inhaler Take 1 Puff by inhalation every twelve (12) hours.  nitroglycerin (NITROSTAT) 0.4 mg SL tablet 0.4 mg by SubLINGual route every five (5) minutes as needed for Chest Pain. No current facility-administered medications for this visit. Health Maintenance   Topic Date Due    LIPID PANEL Q1  1969    FOOT EXAM Q1  02/05/1979    MICROALBUMIN Q1  02/05/1979    EYE EXAM RETINAL OR DILATED Q1  02/05/1979    HEMOGLOBIN A1C Q6M  02/05/2015    INFLUENZA AGE 9 TO ADULT  08/01/2017    DTaP/Tdap/Td series (2 - Td) 12/12/2026    Pneumococcal 19-64 Medium Risk  Completed         FAMILY HISTORY:   Family History   Problem Relation Age of Onset    Anemia Mother     Cancer Father      prostate    Cancer Brother     Diabetes Brother        SOCIAL HISTORY:   He  reports that he has quit smoking. He smoked 0.25 packs per day.  He does not have any smokeless tobacco history on file. He  reports that he does not drink alcohol. ROS:   General: negative for - chills, fatigue, fever, weight loss or weight gain, night sweats  HEENT: negative for - no sore throat, nasal congestion, vision problems or ear problems  Resp: negative for - cough, shortness of breath or wheezing  CV: negative for - chest pain, palpitations, orthopnea or PND,  GI: negative for - abdominal pain, change in bowel habits, constipation, diarrhea, blood or black tarry stools, or nausea/vomiting  : negative for - dysuria, hematuria, increase urgency and frequency, nocturia  Heme: negative for -excessive bleeding or bruising  Endo: negative for - polydipsia/polyuria or hot or cold intolerance  MSK: negative for - joint swelling or muscle pain,positive for joint pain,   Neuro: negative for - numbness, tingling, headache or dizziness  Derm: negative for - dry skin, hair changes, rash or skin lesion changes  Psych: negative for - anxiety, depression, irritability or mood swings, insomnia    OBJECTIVE:  PHYSICAL EXAM: Vitals:   Vitals:    05/03/17 1633 05/03/17 1659   BP: (!) 144/97 120/80   Pulse: 86    Resp: 16    Temp: 98.6 °F (37 °C)    TempSrc: Oral    SpO2: 96%    Weight: 223 lb 9.6 oz (101.4 kg)    Height: 5' 9\" (1.753 m)      Exam:   Generally: Pleasant male in no acute distress    Cardiac exam: regular, rate, and rhythm. No murmurs, gallops, or rubs. Normal S1 and S2.    Pulmonary exam: Clear to ausculation bilaterally    Abdominal exam: Positive bowel sounds in all four quadrants. Soft. Nondistended. Nontender. No hepatosplenomegaly. Extremities: 2+ dorsalis pedis bilaterally. No pedal edema bilaterally.            LABS/RADIOLOGICAL TESTS:   Lab Results   Component Value Date/Time    WBC 10.2 02/11/2017 08:01 AM    HGB 13.7 02/11/2017 08:01 AM    HCT 43.2 02/11/2017 08:01 AM    PLATELET 153 53/73/4802 08:01 AM     Lab Results   Component Value Date/Time    Sodium 145 02/11/2017 08:01 AM    Potassium 3.7 02/11/2017 08:01 AM    Chloride 106 02/11/2017 08:01 AM    CO2 26 02/11/2017 08:01 AM    Glucose 289 02/11/2017 08:01 AM    BUN 11 02/11/2017 08:01 AM    Creatinine 1.59 02/11/2017 08:01 AM     No results found for: CHOL, CHOLX, CHLST, CHOLV, HDL, LDL, DLDL, LDLC, DLDLP, TGL, TGLX, TRIGL, TRIGP  No results found for: GPT   Component      Latest Ref Rng & Units 2/11/2017 2/11/2017           8:04 AM  8:01 AM   BENZODIAZEPINE      NEG   NEGATIVE    BARBITURATES      NEG   NEGATIVE    THC (TH-CANNABINOL)      NEG   NEGATIVE    OPIATES      NEG   NEGATIVE    PCP(PHENCYCLIDINE)      NEG   NEGATIVE    COCAINE      NEG   POSITIVE (A)    AMPHETAMINE      NEG   NEGATIVE    METHADONE       NEGATIVE    HDSCOM       (NOTE)    ALCOHOL(ETHYL),SERUM      0 - 3 MG/DL  3       Previous labs          ASSESSMENT/PLAN:      ICD-10-CM ICD-9-CM    1. Welcome to Medicare preventive visit Z00.00 V70.0    2. Diabetes mellitus, stable ([facility]) E11.9 250.00 We will see what the labs show. Continue the metformin, diet and exercise. TSH 3RD GENERATION        MICROALBUMIN, UR, RAND W/ MICROALBUMIN/CREA RATIO      URINALYSIS W/ RFLX MICROSCOPIC      HEMOGLOBIN A1C WITH EAG      metFORMIN ER 1,000 mg tr24      REFERRAL TO OPHTHALMOLOGY   3. Dyslipidemia E78.5 272.4 We will see what the labs show. Continue the pravastatin, fish oil, diet and exercise. Pt will come back to give blood work as pt not fasting today. METABOLIC PANEL, COMPREHENSIVE      LIPID PANEL   4. Benign hypertension without CHF I10 401.1 Stable. Continue the toprol, lisinopril, diet and exercise. CBC W/O DIFF   5. Uncomplicated asthma, unspecified asthma severity J45.909 493.90 Stable. Continue the albuterol and advair   6. Encounter for vitamin deficiency screening Z13.21 V77.99 VITAMIN D, 25 HYDROXY   7. Chronic pain of both knees M25.561 719.46 REFERRAL TO PAIN MANAGEMENT    M25.562 338.29     G89.29     8.  Pain management G89.29 338.29 REFERRAL TO PAIN MANAGEMENT   9. Requested Prescriptions     Signed Prescriptions Disp Refills    metFORMIN ER 1,000 mg tr24 180 Tab 3     Sig: TAKE 1 TABLET BY MOUTH 2 TIMES A DAY       10. Patient verbalized understanding and agreement with the plan. 11. Patient was given an after visit summary. 12.   Follow-up Disposition:  Return in about 3 months (around 8/3/2017) for f/u DM/HTN. or sooner if worsening symptoms.       Felipe Kenney MD

## 2017-07-20 ENCOUNTER — TELEPHONE (OUTPATIENT)
Dept: INTERNAL MEDICINE CLINIC | Age: 47
End: 2017-07-20

## 2017-07-20 NOTE — TELEPHONE ENCOUNTER
Incoming call from [address] [facility] in regards to fax about pt medications, confirmed all medication on pt med list, they stated understanding no other questions or concerns noted at this time.

## 2017-07-21 ENCOUNTER — APPOINTMENT (OUTPATIENT)
Dept: GENERAL RADIOLOGY | Age: 47
End: 2017-07-21
Attending: EMERGENCY MEDICINE
Payer: MEDICARE

## 2017-07-21 ENCOUNTER — HOSPITAL ENCOUNTER (EMERGENCY)
Age: 47
Discharge: HOME | End: 2017-07-21
Attending: EMERGENCY MEDICINE
Payer: MEDICARE

## 2017-07-21 VITALS
DIASTOLIC BLOOD PRESSURE: 97 MMHG | OXYGEN SATURATION: 100 % | TEMPERATURE: 98.4 F | HEART RATE: 78 BPM | WEIGHT: 215 LBS | SYSTOLIC BLOOD PRESSURE: 134 MMHG | BODY MASS INDEX: 31.75 KG/M2 | RESPIRATION RATE: 16 BRPM

## 2017-07-21 DIAGNOSIS — S39.012A LOW BACK STRAIN, INITIAL ENCOUNTER: Primary | ICD-10-CM

## 2017-07-21 PROCEDURE — 72100 X-RAY EXAM L-S SPINE 2/3 VWS: CPT

## 2017-07-21 PROCEDURE — 99283 EMERGENCY DEPT VISIT LOW MDM: CPT

## 2017-07-21 PROCEDURE — 74011250637 HC RX REV CODE- 250/637: Performed by: EMERGENCY MEDICINE

## 2017-07-21 RX ORDER — IBUPROFEN 400 MG/1
800 TABLET ORAL
Status: COMPLETED | OUTPATIENT
Start: 2017-07-21 | End: 2017-07-21

## 2017-07-21 RX ORDER — IBUPROFEN 800 MG/1
800 TABLET ORAL
Qty: 20 TAB | Refills: 0 | Status: SHIPPED | OUTPATIENT
Start: 2017-07-21 | End: 2017-07-21

## 2017-07-21 RX ORDER — IBUPROFEN 800 MG/1
800 TABLET ORAL
Qty: 20 TAB | Refills: 0 | Status: SHIPPED | OUTPATIENT
Start: 2017-07-21 | End: 2017-07-28

## 2017-07-21 RX ADMIN — IBUPROFEN 800 MG: 400 TABLET ORAL at 17:35

## 2017-07-21 NOTE — DISCHARGE INSTRUCTIONS
May use Tylenol and Motrin  Ice to affected area  NO HEAVY LIFTING OR IMPACT EXERCISE UNTIL PAIN IS GONE  Return immediately for increasing pain, fever, weakness, loss of control of bowel or bladder, or any other concerns
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
## 2017-07-25 ENCOUNTER — OFFICE VISIT (OUTPATIENT)
Dept: INTERNAL MEDICINE CLINIC | Age: 47
End: 2017-07-25

## 2017-07-25 VITALS
HEIGHT: 69 IN | SYSTOLIC BLOOD PRESSURE: 130 MMHG | BODY MASS INDEX: 32.58 KG/M2 | DIASTOLIC BLOOD PRESSURE: 80 MMHG | HEART RATE: 79 BPM | WEIGHT: 220 LBS | TEMPERATURE: 97.5 F | OXYGEN SATURATION: 97 % | RESPIRATION RATE: 29 BRPM

## 2017-07-25 DIAGNOSIS — F32.A DEPRESSION, UNSPECIFIED DEPRESSION TYPE: ICD-10-CM

## 2017-07-25 DIAGNOSIS — F20.9 SCHIZOPHRENIA, UNSPECIFIED TYPE (HCC): ICD-10-CM

## 2017-07-25 DIAGNOSIS — M62.830 MUSCLE SPASM OF BACK: ICD-10-CM

## 2017-07-25 DIAGNOSIS — M54.50 ACUTE LEFT-SIDED LOW BACK PAIN WITHOUT SCIATICA: Primary | ICD-10-CM

## 2017-07-25 RX ORDER — CYCLOBENZAPRINE HCL 10 MG
TABLET ORAL
Qty: 15 TAB | Refills: 0 | Status: SHIPPED | OUTPATIENT
Start: 2017-07-25 | End: 2018-05-02 | Stop reason: SDUPTHER

## 2017-07-25 RX ORDER — DULOXETIN HYDROCHLORIDE 30 MG/1
CAPSULE, DELAYED RELEASE ORAL
Qty: 30 CAP | Refills: 0 | Status: SHIPPED | OUTPATIENT
Start: 2017-07-25 | End: 2018-08-22

## 2017-07-25 NOTE — PROGRESS NOTES
ROOM # 2    Josep Tobias presents today for   Chief Complaint   Patient presents with    Back Pain     lower back        Josep Tobias preferred language for health care discussion is english/other. Is someone accompanying this pt? Yes, comfort    Is the patient using any DME equipment during OV? cane    Depression Screening:  PHQ over the last two weeks 12/12/2016   Little interest or pleasure in doing things Several days   Feeling down, depressed or hopeless Several days   Total Score PHQ 2 2       Learning Assessment:  Learning Assessment 7/7/2016 5/18/2015   PRIMARY LEARNER Patient Patient   HIGHEST LEVEL OF EDUCATION - PRIMARY LEARNER  - 2 YEARS OF COLLEGE   PRIMARY LANGUAGE ENGLISH ENGLISH   LEARNER PREFERENCE PRIMARY LISTENING DEMONSTRATION   ANSWERED BY self patient   RELATIONSHIP SELF SELF       Abuse Screening:  No flowsheet data found. Health Maintenance reviewed and discussed per provider. Yes    Josep Tobias is due for lipid panel, microalbumin, foot exam, hemoglobin. Please order/place referral if appropriate. Advance Directive:  1. Do you have an advance directive in place? Patient Reply: no    2. If not, would you like material regarding how to put one in place? Patient Reply: yes    Coordination of Care:  1. Have you been to the ER, urgent care clinic since your last visit? Hospitalized since your last visit? Yes, jo ann 7/21    2. Have you seen or consulted any other health care providers outside of the [address] [place] [address] since your last visit? Include any pap smears or colon screening. no    Please see Red banners under Allergies, Med rec, Immunizations to remove outside inquires. All correct information has been verified with patient and added to chart. Comments: Patient populated further questions on depression screening.

## 2017-07-25 NOTE — MR AVS SNAPSHOT
Visit Information Date & Time Provider Department Dept. Phone Encounter #  
 7/25/2017  4:00 PM Rhonda Block MD [facility] [phone] [id number] Follow-up Instructions Return in about 1 month (around 8/25/2017) for f/u depression. Your Appointments 8/3/2017  4:30 PM  
Office Visit with Rhonda Block MD  
[facility]) Appt Note: 3 month f/u combo clinic  
 [address]  
  
   
 [address] [facility] Upcoming Health Maintenance Date Due  
 FOOT EXAM Q1 12/25/2017* HEMOGLOBIN A1C Q6M 12/25/2017* MICROALBUMIN Q1 12/25/2017* LIPID PANEL Q1 12/25/2017* INFLUENZA AGE 9 TO ADULT 8/1/2017 EYE EXAM RETINAL OR DILATED Q1 5/3/2018 DTaP/Tdap/Td series (2 - Td) 12/12/2026 *Topic was postponed. The date shown is not the original due date. Allergies as of 7/25/2017  Review Complete On: 7/25/2017 By: Rhonda Block MD  
  
 Severity Noted Reaction Type Reactions Iodine High 04/01/2014    Anaphylaxis Norco [Hydrocodone-acetaminophen] High 12/12/2016    Nausea and Vomiting Gi distress Shellfish Derived High 07/31/2014    Swelling, Angioedema Other reaction(s): anaphylaxis/angioedema [address] AND CRAB Current Immunizations  Never Reviewed Name Date Influenza Vaccine 10/27/2014, 9/1/2014 Pneumococcal Polysaccharide (PPSV-23) 11/4/2014 12:17 PM, 10/31/2013 Not reviewed this visit You Were Diagnosed With   
  
 Codes Comments Depression, unspecified depression type    -  Primary ICD-10-CM: F32.9 ICD-9-CM: 529 Schizophrenia, unspecified type ([facility]     ICD-10-CM: F20.9 ICD-9-CM: 295.90 Acute left-sided low back pain without sciatica     ICD-10-CM: M54.5 ICD-9-CM: 724.2 Muscle spasm of back     ICD-10-CM: N81.130 ICD-9-CM: 724.8 Vitals BP Pulse Temp Resp Height(growth percentile) Weight(growth percentile) (!) 135/91 (BP 1 Location: Right arm, BP Patient Position: Sitting) 79 97.5 °F (36.4 °C) (Oral) 29 5' 9\" (1.753 m) 220 lb (99.8 kg) SpO2 BMI Smoking Status 97% 32.49 kg/m2 Former Smoker BMI and BSA Data Body Mass Index Body Surface Area  
 32.49 kg/m 2 2.2 m 2 Preferred Pharmacy Pharmacy Name Phone [facility] [address] [facility] Your Updated Medication List  
  
   
This list is accurate as of: 7/25/17  4:35 PM.  Always use your most recent med list.  
  
  
  
  
 ADVAIR DISKUS 500-50 mcg/dose diskus inhaler Generic drug:  fluticasone-salmeterol Take 1 Puff by inhalation every twelve (12) hours. albuterol 90 mcg/actuation inhaler Commonly known as:  PROVENTIL HFA, VENTOLIN HFA, PROAIR HFA  
1-2 Puffs. ASPIRIN PO Take 81 mg by mouth. cyclobenzaprine 10 mg tablet Commonly known as:  FLEXERIL Take one po qhs prn muscle spasms  
  
 docusate sodium 100 mg capsule Commonly known as:  Grey Guppy Take one po daily prn constipation DULoxetine 30 mg capsule Commonly known as:  CYMBALTA TAKE 1 CAPSULE BY MOUTH EVERY MORNING  
  
 FISH OIL 60- mg Cap Generic drug:  omega 3-dha-epa-fish oil Take 500 mg by mouth daily. ibuprofen 800 mg tablet Commonly known as:  MOTRIN Take 1 Tab by mouth every six (6) hours as needed for Pain for up to 7 days. lidocaine 5 % ointment Commonly known as:  XYLOCAINE  
APPLY THIN FILM RUB EXTERNALLY TWICE DAILY IN THE MORNING AND EVENING  
  
 lisinopril 20 mg tablet Commonly known as:  PRINIVIL, ZESTRIL  
TAKE 1 TABLET BY MOUTH EVERY DAY AS DIRECTED  
  
 loratadine-pseudoephedrine  mg per tablet Commonly known as:  CLARITIN-D 24-hour Take 1 Tab by Mouth Once a Day. metFORMIN ER 1,000 mg Tr24 TAKE 1 TABLET BY MOUTH 2 TIMES A DAY  
  
 metoprolol succinate 25 mg XL tablet Commonly known as:  TOPROL-XL Take 1 Tab by mouth daily. montelukast 10 mg tablet Commonly known as:  SINGULAIR Take 10 mg by mouth daily. NITROSTAT 0.4 mg SL tablet Generic drug:  nitroglycerin 0.4 mg by SubLINGual route every five (5) minutes as needed for Chest Pain. omeprazole 40 mg capsule Commonly known as:  PRILOSEC  
TAKE 1 CAPSULE BY MOUTH 2 TIMES A DAY  
  
 pravastatin 20 mg tablet Commonly known as:  PRAVACHOL Take 20 mg by mouth nightly. tiotropium 18 mcg inhalation capsule Commonly known as:  Camille Regulus Take 1 Cap by inhalation daily. Prescriptions Sent to Pharmacy Refills DULoxetine (CYMBALTA) 30 mg capsule 0 Sig: TAKE 1 CAPSULE BY MOUTH EVERY MORNING Class: Normal  
 Pharmacy: [address] [place] [address] Ph #: [phone]  
 cyclobenzaprine (FLEXERIL) 10 mg tablet 0 Sig: Take one po qhs prn muscle spasms Class: Normal  
 Pharmacy: [address] [place] [address] Ph #: [phone] We Performed the Following REFERRAL TO PSYCHIATRY [REF91 Custom] Comments:  
 Please evaluate patient for depression and schizophrenia in 1 week. Pt will make own. Follow-up Instructions Return in about 1 month (around 8/25/2017) for f/u depression. Referral Information Referral ID Referred By Referred To  
  
 [id number] [place] [address] Dottie Martínez MD   
   [address] [place] [address] [address] [facility] Phone: [phone] Fax: [phone] Visits Status Start Date End Date 1 New Request 7/25/17 7/25/18 If your referral has a status of pending review or denied, additional information will be sent to support the outcome of this decision. Patient Instructions 1) use heating pad, icy/hot, tiger balm for pain 2) Don't take  Flexeril and cymbalta . while driving or operating heavy machinery. Don't take flexeril and cymbalta At the same time. Take 6-8 hours in between if you have to take the medications. 3) follow-up in 1 month or sooner if worsening symptoms. Back Stretches: Exercises Your Care Instructions Here are some examples of exercises for stretching your back. Start each exercise slowly. Ease off the exercise if you start to have pain. Your doctor or physical therapist will tell you when you can start these exercises and which ones will work best for you. How to do the exercises Overhead stretch 1. Stand comfortably with your feet shoulder-width apart. 2. Looking straight ahead, raise both arms over your head and reach toward the ceiling. Do not allow your head to tilt back. 3. Hold for 15 to 30 seconds, then lower your arms to your sides. 4. Repeat 2 to 4 times. Side stretch 1. Stand comfortably with your feet shoulder-width apart. 2. Raise one arm over your head, and then lean to the other side. 3. Slide your hand down your leg as you let the weight of your arm gently stretch your side muscles. Hold for 15 to 30 seconds. 4. Repeat 2 to 4 times on each side. Press-up 1. Lie on your stomach, supporting your body with your forearms. 2. Press your elbows down into the floor to raise your upper back. As you do this, relax your stomach muscles and allow your back to arch without using your back muscles. As your press up, do not let your hips or pelvis come off the floor. 3. Hold for 15 to 30 seconds, then relax. 4. Repeat 2 to 4 times. Relax and rest 
 
1. Lie on your back with a rolled towel under your neck and a pillow under your knees. Extend your arms comfortably to your sides. 2. Relax and breathe normally. 3. Remain in this position for about 10 minutes. 4. If you can, do this 2 or 3 times each day. Follow-up care is a key part of your treatment and safety. Be sure to make and go to all appointments, and call your doctor if you are having problems. It's also a good idea to know your test results and keep a list of the medicines you take. Where can you learn more? Go to http://carlos a-fernando.info/. Enter U977 in the search box to learn more about \"Back Stretches: Exercises. \" Current as of: March 21, 2017 Content Version: 11.3 © 2010-0223 [employer]. Care instructions adapted under license by [employer] (which disclaims liability or warranty for this information). If you have questions about a medical condition or this instruction, always ask your healthcare professional. [address] any warranty or liability for your use of this information. Low Back Pain: Exercises Your Care Instructions Here are some examples of typical rehabilitation exercises for your condition. Start each exercise slowly. Ease off the exercise if you start to have pain. Your doctor or physical therapist will tell you when you can start these exercises and which ones will work best for you. How to do the exercises Press-up 5. Lie on your stomach, supporting your body with your forearms. 6. Press your elbows down into the floor to raise your upper back. As you do this, relax your stomach muscles and allow your back to arch without using your back muscles. As your press up, do not let your hips or pelvis come off the floor. 7. Hold for 15 to 30 seconds, then relax. 8. Repeat 2 to 4 times. Alternate arm and leg (bird dog) exercise Note: Do this exercise slowly. Try to keep your body straight at all times, and do not let one hip drop lower than the other. 5. Start on the floor, on your hands and knees. 6. Tighten your belly muscles. 7. Raise one leg off the floor, and hold it straight out behind you.  Be careful not to let your hip drop down, because that will twist your trunk. 8. Hold for about 6 seconds, then lower your leg and switch to the other leg. 9. Repeat 8 to 12 times on each leg. 10. Over time, work up to holding for 10 to 30 seconds each time. 11. If you feel stable and secure with your leg raised, try raising the opposite arm straight out in front of you at the same time. Knee-to-chest exercise 5. Lie on your back with your knees bent and your feet flat on the floor. 6. Bring one knee to your chest, keeping the other foot flat on the floor (or keeping the other leg straight, whichever feels better on your lower back). 7. Keep your lower back pressed to the floor. Hold for at least 15 to 30 seconds. 8. Relax, and lower the knee to the starting position. 9. Repeat with the other leg. Repeat 2 to 4 times with each leg. 10. To get more stretch, put your other leg flat on the floor while pulling your knee to your chest. 
Curl-ups 5. Lie on the floor on your back with your knees bent at a 90-degree angle. Your feet should be flat on the floor, about 12 inches from your buttocks. 6. Cross your arms over your chest. If this bothers your neck, try putting your hands behind your neck (not your head), with your elbows spread apart. 7. Slowly tighten your belly muscles and raise your shoulder blades off the floor. 8. Keep your head in line with your body, and do not press your chin to your chest. 
9. Hold this position for 1 or 2 seconds, then slowly lower yourself back down to the floor. 10. Repeat 8 to 12 times. Pelvic tilt exercise 1. Lie on your back with your knees bent. 2. \"Brace\" your stomach. This means to tighten your muscles by pulling in and imagining your belly button moving toward your spine. You should feel like your back is pressing to the floor and your hips and pelvis are rocking back. 3. Hold for about 6 seconds while you breathe smoothly. 4. Repeat 8 to 12 times. Heel dig bridging 1. Lie on your back with both knees bent and your ankles bent so that only your heels are digging into the floor. Your knees should be bent about 90 degrees. 2. Then push your heels into the floor, squeeze your buttocks, and lift your hips off the floor until your shoulders, hips, and knees are all in a straight line. 3. Hold for about 6 seconds as you continue to breathe normally, and then slowly lower your hips back down to the floor and rest for up to 10 seconds. 4. Do 8 to 12 repetitions. Hamstring stretch in doorway 1. Lie on your back in a doorway, with one leg through the open door. 2. Slide your leg up the wall to straighten your knee. You should feel a gentle stretch down the back of your leg. 3. Hold the stretch for at least 15 to 30 seconds. Do not arch your back, point your toes, or bend either knee. Keep one heel touching the floor and the other heel touching the wall. 4. Repeat with your other leg. 5. Do 2 to 4 times for each leg. Hip flexor stretch 1. Kneel on the floor with one knee bent and one leg behind you. Place your forward knee over your foot. Keep your other knee touching the floor. 2. Slowly push your hips forward until you feel a stretch in the upper thigh of your rear leg. 3. Hold the stretch for at least 15 to 30 seconds. Repeat with your other leg. 4. Do 2 to 4 times on each side. Wall sit 1. Stand with your back 10 to 12 inches away from a wall. 2. Lean into the wall until your back is flat against it. 3. Slowly slide down until your knees are slightly bent, pressing your lower back into the wall. 4. Hold for about 6 seconds, then slide back up the wall. 5. Repeat 8 to 12 times. Follow-up care is a key part of your treatment and safety. Be sure to make and go to all appointments, and call your doctor if you are having problems. It's also a good idea to know your test results and keep a list of the medicines you take. Where can you learn more? Go to http://carlos a-fernando.info/. Enter V716 in the search box to learn more about \"Low Back Pain: Exercises. \" Current as of: March 21, 2017 Content Version: 11.3 © 6234-1240 [employer]. Care instructions adapted under license by [employer] (which disclaims liability or warranty for this information). If you have questions about a medical condition or this instruction, always ask your healthcare professional. [address] any warranty or liability for your use of this information. Acute Low Back Pain: Exercises Your Care Instructions Here are some examples of typical rehabilitation exercises for your condition. Start each exercise slowly. Ease off the exercise if you start to have pain. Your doctor or physical therapist will tell you when you can start these exercises and which ones will work best for you. When you are not being active, find a comfortable position for rest. Some people are comfortable on the floor or a medium-firm bed with a small pillow under their head and another under their knees. Some people prefer to lie on their side with a pillow between their knees. Don't stay in one position for too long. Take short walks (10 to 20 minutes) every 2 to 3 hours. Avoid slopes, hills, and stairs until you feel better. Walk only distances you can manage without pain, especially leg pain. How to do the exercises Back stretches 9. Get down on your hands and knees on the floor. 10. Relax your head and allow it to droop. Round your back up toward the ceiling until you feel a nice stretch in your upper, middle, and lower back. Hold this stretch for as long as it feels comfortable, or about 15 to 30 seconds. 11. Return to the starting position with a flat back while you are on your hands and knees. 12. Let your back sway by pressing your stomach toward the floor. Lift your buttocks toward the ceiling. 13. Hold this position for 15 to 30 seconds. 14. Repeat 2 to 4 times. Follow-up care is a key part of your treatment and safety. Be sure to make and go to all appointments, and call your doctor if you are having problems. It's also a good idea to know your test results and keep a list of the medicines you take. Where can you learn more? Go to http://carlos a-fernando.info/. Enter O713 in the search box to learn more about \"Acute Low Back Pain: Exercises. \" Current as of: March 21, 2017 Content Version: 11.3 © 1813-1758 [employer]. Care instructions adapted under license by "[employer] a division [employer]" (which disclaims liability or warranty for this information). If you have questions about a medical condition or this instruction, always ask your healthcare professional. [address] any warranty or liability for your use of this information. Introducing [address]! Dear Aaron Paige: Thank you for requesting a Care2Manage account. Our records indicate that you already have an active Care2Manage account. You can access your account anytime at [email or web address] Did you know that you can access your hospital and ER discharge instructions at any time in Care2Manage? You can also review all of your test results from your hospital stay or ER visit. Additional Information If you have questions, please visit the Frequently Asked Questions section of the Care2Manage website at [email or web address] Remember, Care2Manage is NOT to be used for urgent needs. For medical emergencies, dial 911. Now available from your iPhone and Android! Please provide this summary of care documentation to your next provider. Your primary care clinician is listed as Tony Howard. If you have any questions after today's visit, please call [phone].
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
## 2017-08-08 ENCOUNTER — OFFICE VISIT (OUTPATIENT)
Dept: INTERNAL MEDICINE CLINIC | Age: 47
End: 2017-08-08

## 2017-08-08 VITALS
DIASTOLIC BLOOD PRESSURE: 86 MMHG | SYSTOLIC BLOOD PRESSURE: 132 MMHG | RESPIRATION RATE: 18 BRPM | OXYGEN SATURATION: 98 % | BODY MASS INDEX: 33 KG/M2 | HEIGHT: 69 IN | HEART RATE: 81 BPM | TEMPERATURE: 98.5 F | WEIGHT: 222.8 LBS

## 2017-08-08 DIAGNOSIS — F32.A DEPRESSION, UNSPECIFIED DEPRESSION TYPE: Primary | ICD-10-CM

## 2017-08-08 NOTE — PROGRESS NOTES
ROOM # [id number]    Josep Tobias presents today for   Chief Complaint   Patient presents with    Hypertension     3 mo f/u    Diabetes       Josep Tobias preferred language for health care discussion is english/other. Is someone accompanying this pt? no    Is the patient using any DME equipment during OV? no    Depression Screening:  PHQ over the last two weeks 8/8/2017 7/25/2017 12/12/2016   Little interest or pleasure in doing things Several days Nearly every day Several days   Feeling down, depressed or hopeless Nearly every day Nearly every day Several days   Total Score PHQ 2 4 6 2       Learning Assessment:  Learning Assessment 7/7/2016 5/18/2015   PRIMARY LEARNER Patient Patient   HIGHEST LEVEL OF EDUCATION - PRIMARY LEARNER  - 2 YEARS OF COLLEGE   PRIMARY LANGUAGE ENGLISH ENGLISH   LEARNER PREFERENCE PRIMARY LISTENING DEMONSTRATION   ANSWERED BY self patient   RELATIONSHIP SELF SELF       Abuse Screening:  No flowsheet data found. Health Maintenance reviewed and discussed per provider. Yes    Josep Tobias is due for up to date. Provider prefers to order all HM due himself. Please order/place referral if appropriate. Advance Directive:  1. Do you have an advance directive in place? Patient Reply: no    2. If not, would you like material regarding how to put one in place? Patient Reply: no    Coordination of Care:  1. Have you been to the ER, urgent care clinic since your last visit? Hospitalized since your last visit? no    2. Have you seen or consulted any other health care providers outside of the [address] [place] [address] since your last visit? Include any pap smears or colon screening. no    Please see Red banners under Allergies, Med rec, Immunizations to remove outside inquires. All correct information has been verified with patient and added to chart. Comments: Per verbal order from provider, med rec was not performed by nurse. Provider prefers to complete this himself.

## 2017-08-08 NOTE — MR AVS SNAPSHOT
Visit Information Date & Time Provider Department Dept. Phone Encounter #  
 8/8/2017  4:00 PM [address] [phone] [id number] Upcoming Health Maintenance Date Due INFLUENZA AGE 9 TO ADULT 10/8/2017* FOOT EXAM Q1 12/25/2017* HEMOGLOBIN A1C Q6M 12/25/2017* MICROALBUMIN Q1 12/25/2017* LIPID PANEL Q1 12/25/2017* EYE EXAM RETINAL OR DILATED Q1 5/3/2018 MEDICARE YEARLY EXAM 5/4/2018 DTaP/Tdap/Td series (2 - Td) 12/12/2026 *Topic was postponed. The date shown is not the original due date. Allergies as of 8/8/2017  Review Complete On: 8/8/2017 By: Linsey Sauceda LPN Severity Noted Reaction Type Reactions Iodine High 04/01/2014    Anaphylaxis Norco [Hydrocodone-acetaminophen] High 12/12/2016    Nausea and Vomiting Gi distress Shellfish Derived High 07/31/2014    Swelling, Angioedema Other reaction(s): anaphylaxis/angioedema [address] AND CRAB Current Immunizations  Never Reviewed Name Date Influenza Vaccine 10/27/2014, 9/1/2014 Pneumococcal Polysaccharide (PPSV-23) 11/4/2014 12:17 PM, 10/31/2013 Not reviewed this visit You Were Diagnosed With   
  
 Codes Comments Depression, unspecified depression type    -  Primary ICD-10-CM: F32.9 ICD-9-CM: 886 Vitals BP Pulse Temp Resp Height(growth percentile) Weight(growth percentile) 132/86 (BP 1 Location: Left arm, BP Patient Position: Sitting) 81 98.5 °F (36.9 °C) (Oral) 18 5' 9\" (1.753 m) 222 lb 12.8 oz (101.1 kg) SpO2 BMI Smoking Status 98% 32.9 kg/m2 Former Smoker BMI and BSA Data Body Mass Index Body Surface Area 32.9 kg/m 2 2.22 m 2 Preferred Pharmacy Pharmacy Name Phone [facility] [address] [place] [address] Your Updated Medication List  
  
   
 This list is accurate as of: 8/8/17  4:35 PM.  Always use your most recent med list.  
  
  
  
  
 ADVAIR DISKUS 500-50 mcg/dose diskus inhaler Generic drug:  fluticasone-salmeterol Take 1 Puff by inhalation every twelve (12) hours. albuterol 90 mcg/actuation inhaler Commonly known as:  PROVENTIL HFA, VENTOLIN HFA, PROAIR HFA  
1-2 Puffs. ASPIRIN PO Take 81 mg by mouth. cyclobenzaprine 10 mg tablet Commonly known as:  FLEXERIL Take one po qhs prn muscle spasms  
  
 docusate sodium 100 mg capsule Commonly known as:  Walterine Raymon Take one po daily prn constipation DULoxetine 30 mg capsule Commonly known as:  CYMBALTA TAKE 1 CAPSULE BY MOUTH EVERY MORNING  
  
 FISH OIL 60- mg Cap Generic drug:  omega 3-dha-epa-fish oil Take 500 mg by mouth daily. lidocaine 5 % ointment Commonly known as:  XYLOCAINE  
APPLY THIN FILM RUB EXTERNALLY TWICE DAILY IN THE MORNING AND EVENING  
  
 lisinopril 20 mg tablet Commonly known as:  PRINIVIL, ZESTRIL  
TAKE 1 TABLET BY MOUTH EVERY DAY AS DIRECTED  
  
 loratadine-pseudoephedrine  mg per tablet Commonly known as:  CLARITIN-D 24-hour Take 1 Tab by Mouth Once a Day. metFORMIN ER 1,000 mg Tr24 TAKE 1 TABLET BY MOUTH 2 TIMES A DAY  
  
 metoprolol succinate 25 mg XL tablet Commonly known as:  TOPROL-XL Take 1 Tab by mouth daily. montelukast 10 mg tablet Commonly known as:  SINGULAIR Take 10 mg by mouth daily. NITROSTAT 0.4 mg SL tablet Generic drug:  nitroglycerin 0.4 mg by SubLINGual route every five (5) minutes as needed for Chest Pain. omeprazole 40 mg capsule Commonly known as:  PRILOSEC  
TAKE 1 CAPSULE BY MOUTH 2 TIMES A DAY  
  
 pravastatin 20 mg tablet Commonly known as:  PRAVACHOL Take 20 mg by mouth nightly. tiotropium 18 mcg inhalation capsule Commonly known as:  Porfirio Nelson Take 1 Cap by inhalation daily. Introducing [facility]! Dear Aston Farias: Thank you for requesting a [facility] account. Our records indicate that you already have an active [employer] account. You can access your account anytime at [email or web address] Did you know that you can access your hospital and ER discharge instructions at any time in [employer]? You can also review all of your test results from your hospital stay or ER visit. Additional Information If you have questions, please visit the Frequently Asked Questions section of the [employer] website at [email or web address] Remember, [employer] is NOT to be used for urgent needs. For medical emergencies, dial 911. Now available from your iPhone and Android! Please provide this summary of care documentation to your next provider. Your primary care clinician is listed as Tony Howard. If you have any questions after today's visit, please call [phone].

## 2017-08-08 NOTE — PROGRESS NOTES
FAMILY MEDICINE CLINIC NOTE    S: Patient presents for follow up on his depression. He has recently restarted taking cymbalta which he states is working well. He reports no adverse effects. O:  Visit Vitals    /86 (BP 1 Location: Left arm, BP Patient Position: Sitting)    Pulse 81    Temp 98.5 °F (36.9 °C) (Oral)    Resp 18    Ht 5' 9\" (1.753 m)    Wt 222 lb 12.8 oz (101.1 kg)    SpO2 98%    BMI 32.9 kg/m2     NAD, comfortable  RRR, no murmurs  CTABL, no wheezing/ronchi/rales    49 y.o. male      ICD-10-CM ICD-9-CM    1.  Depression, unspecified depression type F32.9 311 Continue cymbalta

## 2018-05-02 ENCOUNTER — HOSPITAL ENCOUNTER (OUTPATIENT)
Dept: LAB | Age: 48
Discharge: HOME | End: 2018-05-02
Payer: MEDICARE

## 2018-05-02 ENCOUNTER — OFFICE VISIT (OUTPATIENT)
Dept: INTERNAL MEDICINE CLINIC | Age: 48
End: 2018-05-02

## 2018-05-02 VITALS
BODY MASS INDEX: 31.46 KG/M2 | HEART RATE: 82 BPM | HEIGHT: 69 IN | TEMPERATURE: 98.4 F | SYSTOLIC BLOOD PRESSURE: 108 MMHG | DIASTOLIC BLOOD PRESSURE: 83 MMHG | OXYGEN SATURATION: 96 % | WEIGHT: 212.4 LBS | RESPIRATION RATE: 16 BRPM

## 2018-05-02 DIAGNOSIS — E78.5 HYPERLIPIDEMIA, UNSPECIFIED HYPERLIPIDEMIA TYPE: ICD-10-CM

## 2018-05-02 DIAGNOSIS — R35.0 URINARY FREQUENCY: ICD-10-CM

## 2018-05-02 DIAGNOSIS — H66.92 LEFT OTITIS MEDIA, UNSPECIFIED OTITIS MEDIA TYPE: ICD-10-CM

## 2018-05-02 DIAGNOSIS — I10 HYPERTENSION, UNSPECIFIED TYPE: ICD-10-CM

## 2018-05-02 DIAGNOSIS — G89.29 CHRONIC PAIN OF BOTH KNEES: ICD-10-CM

## 2018-05-02 DIAGNOSIS — M62.830 MUSCLE SPASM OF BACK: ICD-10-CM

## 2018-05-02 DIAGNOSIS — E11.9 TYPE 2 DIABETES MELLITUS WITHOUT COMPLICATION, WITHOUT LONG-TERM CURRENT USE OF INSULIN (HCC): ICD-10-CM

## 2018-05-02 DIAGNOSIS — M25.561 CHRONIC PAIN OF BOTH KNEES: ICD-10-CM

## 2018-05-02 DIAGNOSIS — M25.562 CHRONIC PAIN OF BOTH KNEES: ICD-10-CM

## 2018-05-02 DIAGNOSIS — E11.9 TYPE 2 DIABETES MELLITUS WITHOUT COMPLICATION, WITHOUT LONG-TERM CURRENT USE OF INSULIN (HCC): Primary | ICD-10-CM

## 2018-05-02 LAB
ALBUMIN SERPL-MCNC: 3.8 G/DL (ref 3.4–5)
ALBUMIN/GLOB SERPL: 1.4 {RATIO} (ref 0.8–1.7)
ALP SERPL-CCNC: 91 U/L (ref 45–117)
ALT SERPL-CCNC: 93 U/L (ref 16–61)
ANION GAP SERPL CALC-SCNC: 8 MMOL/L (ref 3–18)
AST SERPL-CCNC: 38 U/L (ref 15–37)
BILIRUB SERPL-MCNC: 0.2 MG/DL (ref 0.2–1)
BILIRUB UR QL STRIP: NORMAL
BUN SERPL-MCNC: 10 MG/DL (ref 7–18)
BUN/CREAT SERPL: 7 (ref 12–20)
CALCIUM SERPL-MCNC: 9.1 MG/DL (ref 8.5–10.1)
CHLORIDE SERPL-SCNC: 101 MMOL/L (ref 100–108)
CHOLEST SERPL-MCNC: 194 MG/DL
CO2 SERPL-SCNC: 31 MMOL/L (ref 21–32)
CREAT SERPL-MCNC: 1.41 MG/DL (ref 0.6–1.3)
GLOBULIN SER CALC-MCNC: 2.8 G/DL (ref 2–4)
GLUCOSE SERPL-MCNC: 264 MG/DL (ref 74–99)
GLUCOSE UR-MCNC: NORMAL MG/DL
HBA1C MFR BLD: 9 % (ref 4.2–5.6)
HDLC SERPL-MCNC: 39 MG/DL (ref 40–60)
HDLC SERPL: 5 {RATIO} (ref 0–5)
KETONES P FAST UR STRIP-MCNC: NORMAL MG/DL
LDLC SERPL CALC-MCNC: 94.6 MG/DL (ref 0–100)
LIPID PROFILE,FLP: ABNORMAL
PH UR STRIP: 5.5 [PH] (ref 4.6–8)
POTASSIUM SERPL-SCNC: 4.2 MMOL/L (ref 3.5–5.5)
PROT SERPL-MCNC: 6.6 G/DL (ref 6.4–8.2)
PROT UR QL STRIP: NORMAL
SODIUM SERPL-SCNC: 140 MMOL/L (ref 136–145)
SP GR UR STRIP: 1.03 (ref 1–1.03)
TRIGL SERPL-MCNC: 302 MG/DL (ref ?–150)
TSH SERPL DL<=0.05 MIU/L-ACNC: 1.88 UIU/ML (ref 0.36–3.74)
UA UROBILINOGEN AMB POC: NORMAL (ref 0.2–1)
URINALYSIS CLARITY POC: CLEAR
URINALYSIS COLOR POC: NORMAL
URINE BLOOD POC: NEGATIVE
URINE LEUKOCYTES POC: NEGATIVE
URINE NITRITES POC: NEGATIVE
VLDLC SERPL CALC-MCNC: 60.4 MG/DL

## 2018-05-02 PROCEDURE — 84443 ASSAY THYROID STIM HORMONE: CPT | Performed by: FAMILY MEDICINE

## 2018-05-02 PROCEDURE — 83036 HEMOGLOBIN GLYCOSYLATED A1C: CPT | Performed by: FAMILY MEDICINE

## 2018-05-02 PROCEDURE — 36415 COLL VENOUS BLD VENIPUNCTURE: CPT | Performed by: FAMILY MEDICINE

## 2018-05-02 PROCEDURE — 80061 LIPID PANEL: CPT | Performed by: FAMILY MEDICINE

## 2018-05-02 PROCEDURE — 82043 UR ALBUMIN QUANTITATIVE: CPT | Performed by: FAMILY MEDICINE

## 2018-05-02 PROCEDURE — 80053 COMPREHEN METABOLIC PANEL: CPT | Performed by: FAMILY MEDICINE

## 2018-05-02 RX ORDER — AMOXICILLIN 500 MG/1
500 CAPSULE ORAL 2 TIMES DAILY
Qty: 14 CAP | Refills: 0 | Status: SHIPPED | OUTPATIENT
Start: 2018-05-02 | End: 2018-05-09

## 2018-05-02 RX ORDER — CYCLOBENZAPRINE HCL 10 MG
TABLET ORAL
Qty: 15 TAB | Refills: 0 | Status: SHIPPED | OUTPATIENT
Start: 2018-05-02 | End: 2018-10-25

## 2018-05-02 RX ORDER — CETIRIZINE HYDROCHLORIDE, PSEUDOEPHEDRINE HYDROCHLORIDE 5; 120 MG/1; MG/1
1 TABLET, FILM COATED, EXTENDED RELEASE ORAL 2 TIMES DAILY
Qty: 24 TAB | Refills: 2 | Status: SHIPPED | OUTPATIENT
Start: 2018-05-02 | End: 2018-08-22

## 2018-05-02 RX ORDER — CYCLOBENZAPRINE HCL 10 MG
TABLET ORAL
Qty: 15 TAB | Refills: 0 | Status: CANCELLED | OUTPATIENT
Start: 2018-05-02

## 2018-05-02 RX ORDER — QUETIAPINE FUMARATE 100 MG/1
100 TABLET, FILM COATED ORAL
COMMUNITY
Start: 2018-04-30 | End: 2021-05-17

## 2018-05-02 RX ORDER — MELOXICAM 15 MG/1
15 TABLET ORAL
Qty: 90 TAB | Refills: 1 | Status: SHIPPED | OUTPATIENT
Start: 2018-05-02 | End: 2018-07-11

## 2018-05-02 RX ORDER — PAROXETINE HYDROCHLORIDE 20 MG/1
20 TABLET, FILM COATED ORAL DAILY
COMMUNITY
Start: 2018-04-30 | End: 2021-05-17

## 2018-05-02 RX ORDER — KETOROLAC TROMETHAMINE 30 MG/ML
60 INJECTION, SOLUTION INTRAMUSCULAR; INTRAVENOUS ONCE
Qty: 1 VIAL | Refills: 0
Start: 2018-05-02 | End: 2018-05-02

## 2018-05-02 NOTE — PROGRESS NOTES
Fabián Medrano presents today for   Chief Complaint   Patient presents with    Knee Pain     no recent injury    Back Pain     no apparent injury    Cough     productive- thick yellow sputum.  Fever     t max 101    Medication Refill     Requested Prescriptions     Pending Prescriptions Disp Refills    cyclobenzaprine (FLEXERIL) 10 mg tablet 15 Tab 0     Sig: Take one po qhs prn muscle spasms         Fabián Medrano preferred language for health care discussion is english/other. Is someone accompanying this pt? Yes, s/o    Is the patient using any DME equipment during OV? no    Depression Screening:  PHQ over the last two weeks 8/8/2017 7/25/2017 12/12/2016   Little interest or pleasure in doing things Several days Nearly every day Several days   Feeling down, depressed or hopeless Nearly every day Nearly every day Several days   Total Score PHQ 2 4 6 2       Learning Assessment:  Learning Assessment 7/7/2016 5/18/2015   PRIMARY LEARNER Patient Patient   HIGHEST LEVEL OF EDUCATION - PRIMARY LEARNER  - 2 YEARS OF COLLEGE   PRIMARY LANGUAGE ENGLISH ENGLISH   LEARNER PREFERENCE PRIMARY LISTENING DEMONSTRATION   ANSWERED BY self patient   RELATIONSHIP SELF SELF       Abuse Screening:  No flowsheet data found. Fall Risk  Fall Risk Assessment, last 12 mths 8/4/2014   Able to walk? Yes   Fall in past 12 months? No       Health Maintenance reviewed and discussed per provider. Yes    Fabián Medrano is due for   Health Maintenance Due   Topic Date Due    LIPID PANEL Q1  1969    FOOT EXAM Q1  02/05/1979    MICROALBUMIN Q1  02/05/1979    HEMOGLOBIN A1C Q6M  02/05/2015    EYE EXAM RETINAL OR DILATED Q1  05/03/2018     Please order/place referral if appropriate. Advance Directive:  1. Do you have an advance directive in place? Patient Reply: no    2. If not, would you like material regarding how to put one in place? Patient Reply: no    Coordination of Care:  1.  Have you been to the ER, urgent care clinic since your last visit? Hospitalized since your last visit? no    2. Have you seen or consulted any other health care providers outside of the [facility] since your last visit? Include any pap smears or colon screening. no      After obtaining consent, and per orders of Dr. Maia Esqueda, injection of Toradol given by Elisa Farnsworth LPN. Patient instructed to remain in clinic for 10 minutes afterwards, and to report any adverse reaction to me immediately.  No reaction noted/reported

## 2018-05-02 NOTE — MR AVS SNAPSHOT
[address] [place] [address] 
 
 
 [address] [facility] [id number] 
[phone] Patient: Mabel Nelson MRN: [id number] Visit Information Date & Time Provider Department Dept. Phone Encounter #  
 5/2/2018  3:15 PM [address] [phone] [id number] Upcoming Health Maintenance Date Due  
 LIPID PANEL Q1 1969 FOOT EXAM Q1 2/5/1979 MICROALBUMIN Q1 2/5/1979 HEMOGLOBIN A1C Q6M 2/5/2015 EYE EXAM RETINAL OR DILATED Q1 5/3/2018 MEDICARE YEARLY EXAM 5/4/2018 Influenza Age 5 to Adult 8/1/2018 DTaP/Tdap/Td series (2 - Td) 12/12/2026 Allergies as of 5/2/2018  Review Complete On: 5/2/2018 By: Charles Hansen LPN Severity Noted Reaction Type Reactions Iodine High 04/01/2014    Anaphylaxis Norco [Hydrocodone-acetaminophen] High 12/12/2016    Nausea and Vomiting Gi distress Shellfish Derived High 07/31/2014    Swelling, Angioedema Other reaction(s): anaphylaxis/angioedema [address] AND CRAB Current Immunizations  Never Reviewed Name Date Influenza Vaccine 10/27/2014, 9/1/2014 Pneumococcal Polysaccharide (PPSV-23) 11/4/2014 12:17 PM, 10/31/2013 Not reviewed this visit You Were Diagnosed With   
  
 Codes Comments Type 2 diabetes mellitus without complication, without long-term current use of insulin (HCC)    -  Primary ICD-10-CM: E11.9 ICD-9-CM: 250.00 Muscle spasm of back     ICD-10-CM: U22.837 ICD-9-CM: 724.8 Hyperlipidemia, unspecified hyperlipidemia type     ICD-10-CM: E78.5 ICD-9-CM: 272.4 Hypertension, unspecified type     ICD-10-CM: I10 
ICD-9-CM: 401.9 Left otitis media, unspecified otitis media type     ICD-10-CM: H66.92 
ICD-9-CM: 382. 9 Chronic pain of both knees     ICD-10-CM: M25.561, M25.562, G89.29 ICD-9-CM: 719.46, 338.29 Urinary frequency     ICD-10-CM: R35.0 ICD-9-CM: 788.41 Vitals BP Pulse Temp Resp Height(growth percentile) Weight(growth percentile) 108/83 (BP 1 Location: Left arm, BP Patient Position: Sitting) 82 98.4 °F (36.9 °C) (Oral) 16 5' 9\" (1.753 m) 212 lb 6.4 oz (96.3 kg) SpO2 BMI Smoking Status 96% 31.37 kg/m2 Former Smoker Vitals History BMI and BSA Data Body Mass Index Body Surface Area  
 31.37 kg/m 2 2.17 m 2 Preferred Pharmacy Pharmacy Name Phone [facility] [phone] Your Updated Medication List  
  
   
This list is accurate as of 5/2/18  4:01 PM.  Always use your most recent med list.  
  
  
  
  
 ADVAIR DISKUS 500-50 mcg/dose diskus inhaler Generic drug:  fluticasone-salmeterol Take 1 Puff by inhalation every twelve (12) hours. albuterol 90 mcg/actuation inhaler Commonly known as:  PROVENTIL HFA, VENTOLIN HFA, PROAIR HFA  
1-2 Puffs. amoxicillin 500 mg capsule Commonly known as:  AMOXIL Take 1 Cap by mouth two (2) times a day for 7 days. ASPIRIN PO Take 81 mg by mouth. cyclobenzaprine 10 mg tablet Commonly known as:  FLEXERIL Take one po qhs prn muscle spasms  
  
 docusate sodium 100 mg capsule Commonly known as:  Kennieth Argue Take one po daily prn constipation DULoxetine 30 mg capsule Commonly known as:  CYMBALTA TAKE 1 CAPSULE BY MOUTH EVERY MORNING  
  
 FISH OIL 60- mg Cap Generic drug:  omega 3-dha-epa-fish oil Take 500 mg by mouth daily. ketorolac tromethamine 60 mg/2 mL Soln Commonly known as:  TORADOL  
2 mL by IntraMUSCular route once for 1 dose. lidocaine 5 % ointment Commonly known as:  XYLOCAINE  
APPLY THIN FILM RUB EXTERNALLY TWICE DAILY IN THE MORNING AND EVENING  
  
 lisinopril 20 mg tablet Commonly known as:  PRINIVIL, ZESTRIL  
TAKE 1 TABLET BY MOUTH EVERY DAY AS DIRECTED  
  
 loratadine-pseudoephedrine  mg per tablet Commonly known as:  CLARITIN-D 24-hour Take 1 Tab by Mouth Once a Day. meloxicam 15 mg tablet Commonly known as:  MOBIC Take 1 Tab by mouth daily as needed for Pain (knees). metFORMIN ER 1,000 mg Tr24 TAKE 1 TABLET BY MOUTH 2 TIMES A DAY  
  
 metoprolol succinate 25 mg XL tablet Commonly known as:  TOPROL-XL Take 1 Tab by mouth daily. montelukast 10 mg tablet Commonly known as:  SINGULAIR Take 10 mg by mouth daily. NITROSTAT 0.4 mg SL tablet Generic drug:  nitroglycerin 0.4 mg by SubLINGual route every five (5) minutes as needed for Chest Pain. omeprazole 40 mg capsule Commonly known as:  PRILOSEC  
TAKE 1 CAPSULE BY MOUTH 2 TIMES A DAY PARoxetine 20 mg tablet Commonly known as:  PAXIL  
  
 pravastatin 20 mg tablet Commonly known as:  PRAVACHOL Take 20 mg by mouth nightly. QUEtiapine 100 mg tablet Commonly known as:  SEROquel  
  
 tiotropium 18 mcg inhalation capsule Commonly known as:  Melissa Jacobo Take 1 Cap by inhalation daily. Prescriptions Sent to Pharmacy Refills  
 meloxicam (MOBIC) 15 mg tablet 1 Sig: Take 1 Tab by mouth daily as needed for Pain (knees). Class: Normal  
 Pharmacy: [address] Ph #: [phone] Route: Oral  
 amoxicillin (AMOXIL) 500 mg capsule 0 Sig: Take 1 Cap by mouth two (2) times a day for 7 days. Class: Normal  
 Pharmacy: [address] Ph #: [phone] Route: Oral  
 cyclobenzaprine (FLEXERIL) 10 mg tablet 0 Sig: Take one po qhs prn muscle spasms Class: Normal  
 Pharmacy: [address] Ph #: [phone] We Performed the Following AMB POC URINALYSIS DIP STICK AUTO W/O MICRO [03565 CPT(R)] KETOROLAC TROMETHAMINE INJ [ [facility]] [place] THER/PROPH/DIAG INJECTION, SUBCUT/IM [id number] CPT(R)] REFERRAL TO ORTHOPEDICS [GVJ304 Custom] Comments:  
 Bilateral chronic knee pain with left TKR in 2014 To-Do List   
 05/02/2018 Lab:  HEMOGLOBIN A1C W/O EAG   
  
 05/02/2018 Lab:  LIPID PANEL   
  
 05/02/2018 Lab:  METABOLIC PANEL, COMPREHENSIVE   
  
 05/02/2018 Lab:  MICROALBUMIN, UR, RAND W/ MICROALB/CREAT RATIO   
  
 05/02/2018 Lab:  TSH 3RD GENERATION Referral Information Referral ID Referred By Referred To  
  
 [id number] [facility] [address] [place], [place] [address] Phone: [phone] Fax: [phone] Visits Status Start Date End Date 1 New Request 5/2/18 5/2/19 If your referral has a status of pending review or denied, additional information will be sent to support the outcome of this decision. Introducing [facility]! Dear Raymon Vance: Thank you for requesting a [employer] account. Our records indicate that you already have an active [employer] account. You can access your account anytime at [email or web address]. [email or web address] Did you know that you can access your hospital and ER discharge instructions at any time in [employer]? You can also review all of your test results from your hospital stay or ER visit. Additional Information If you have questions, please visit the Frequently Asked Questions section of the [employer] website at [email or web address] Remember, [employer] is NOT to be used for urgent needs. For medical emergencies, dial 911. Now available from your iPhone and Android! Please provide this summary of care documentation to your next provider. Your primary care clinician is listed as Tony Howard. If you have any questions after today's visit, please call [phone].

## 2018-05-02 NOTE — PROGRESS NOTES
FAMILY MEDICINE CLINIC NOTE    S: the patient presents with a complaint of bilateral knee pain, acute on chronic. History of left knee replacement in 2014. He was last seen by orthopedics last year, needs a referral to orthopedics. He reports pain in the bilateral lumbar region, acute on chronic since 2010. Has taken flexeril for this in the past.    The patient also reports cough productive of yellowish sputum, with subjective fever for the last week, the cough is worse in the early morning associated with ear congestion. 1 sick contact. Patient reports urinary frequency for the last month, no change in urinary flow, no dysuria, no flank pain      Current Outpatient Prescriptions on File Prior to Visit   Medication Sig Dispense Refill    docusate sodium (COLACE) 100 mg capsule Take one po daily prn constipation 90 Cap 3    metFORMIN ER 1,000 mg tr24 TAKE 1 TABLET BY MOUTH 2 TIMES A  Tab 3    metoprolol succinate (TOPROL-XL) 25 mg XL tablet Take 1 Tab by mouth daily. 90 Tab 3    lidocaine (XYLOCAINE) 5 % ointment APPLY THIN FILM RUB EXTERNALLY TWICE DAILY IN THE MORNING AND EVENING  3    lisinopril (PRINIVIL, ZESTRIL) 20 mg tablet TAKE 1 TABLET BY MOUTH EVERY DAY AS DIRECTED  10    omeprazole (PRILOSEC) 40 mg capsule TAKE 1 CAPSULE BY MOUTH 2 TIMES A DAY  10    albuterol (PROVENTIL HFA, VENTOLIN HFA, PROAIR HFA) 90 mcg/actuation inhaler 1-2 Puffs.  omega 3-dha-epa-fish oil (FISH OIL) 60- mg cap Take 500 mg by mouth daily.  pravastatin (PRAVACHOL) 20 mg tablet Take 20 mg by mouth nightly.  tiotropium (SPIRIVA) 18 mcg inhalation capsule Take 1 Cap by inhalation daily.  ASPIRIN PO Take 81 mg by mouth.  fluticasone-salmeterol (ADVAIR DISKUS) 500-50 mcg/dose diskus inhaler Take 1 Puff by inhalation every twelve (12) hours.  nitroglycerin (NITROSTAT) 0.4 mg SL tablet 0.4 mg by SubLINGual route every five (5) minutes as needed for Chest Pain.       DULoxetine (CYMBALTA) 30 mg capsule TAKE 1 CAPSULE BY MOUTH EVERY MORNING 30 Cap 0    loratadine-pseudoephedrine (CLARITIN-D 24-HOUR)  mg per tablet Take 1 Tab by Mouth Once a Day.  montelukast (SINGULAIR) 10 mg tablet Take 10 mg by mouth daily. No current facility-administered medications on file prior to visit. Past Medical History:   Diagnosis Date    Advance directive discussed with patient 05/03/2017    Arthritis     left knee    Asthma     CAD (coronary artery disease)     heart attack 6/2012, Being followed by Dr. Vernell Bonilla Cocaine use 2017    Depression     [facility] [address]     GERD (gastroesophageal reflux disease)     Hypertension     Psychiatric disorder     depression and schziophenia    [facility]     Will be seeing [employer]    Unspecified sleep apnea     cpap machine,Will be seeing neurologist       Social History     Social History    Marital status: SINGLE     Spouse name: N/A    Number of children: N/A    Years of education: N/A     Occupational History    Not on file.      Social History Main Topics    Smoking status: Former Smoker     Packs/day: 0.25    Smokeless tobacco: Never Used    Alcohol use No      Comment: \"a little\"    Drug use: No    Sexual activity: Yes     Partners: Female     Other Topics Concern    Not on file     Social History Narrative       Family History   Problem Relation Age of Onset    Anemia Mother     Cancer Father      prostate    Cancer Brother     Diabetes Brother        O:  Visit Vitals    /83 (BP 1 Location: Left arm, BP Patient Position: Sitting)    Pulse 82    Temp 98.4 °F (36.9 °C) (Oral)    Resp 16    Ht 5' 9\" (1.753 m)    Wt 212 lb 6.4 oz (96.3 kg)    SpO2 96%    BMI 31.37 kg/m2     NAD, comfortable  Bulging TM bilat, left TM with air/fluid levels and distorted architecture  Erythema of the posterior oropharynx  RRR, no murmurs  CTABL, no wheezing/ronchi/rales  Decreased passive ROM, bilateral knees, left knee remote surgical scar  Spasm of bilateral lumbar musculature R>L     51 y.o. male      ICD-10-CM ICD-9-CM    1. Type 2 diabetes mellitus without complication, without long-term current use of insulin (HCC) E11.9 250.00 MICROALBUMIN, UR, RAND W/ MICROALB/CREAT RATIO      HEMOGLOBIN A1C W/O EAG      METABOLIC PANEL, COMPREHENSIVE      TSH 3RD GENERATION   2. Muscle spasm of back M62.830 724.8 cyclobenzaprine (FLEXERIL) 10 mg tablet   3. Hyperlipidemia, unspecified hyperlipidemia type E78.5 272.4 LIPID PANEL      METABOLIC PANEL, COMPREHENSIVE      TSH 3RD GENERATION   4. Hypertension, unspecified type N18 200.7 METABOLIC PANEL, COMPREHENSIVE      TSH 3RD GENERATION   5. Left otitis media, unspecified otitis media type H66.92 382.9 amoxicillin (AMOXIL) 500 mg capsule      cetirizine-psuedoePHEDrine (ZYRTEC-D) 5-120 mg per tablet   6. Chronic pain of both knees M25.561 719.46 ketorolac tromethamine (TORADOL) 60 mg/2 mL soln    M25.562 338.29 KETOROLAC TROMETHAMINE INJ    G89.29  ID THER/PROPH/DIAG INJECTION, SUBCUT/IM      meloxicam (MOBIC) 15 mg tablet      REFERRAL TO ORTHOPEDICS   7.  Urinary frequency R35.0 788.41 AMB POC URINALYSIS DIP STICK AUTO W/O MICRO

## 2018-05-03 ENCOUNTER — TELEPHONE (OUTPATIENT)
Dept: INTERNAL MEDICINE CLINIC | Age: 48
End: 2018-05-03

## 2018-05-03 LAB
CREAT UR-MCNC: 484 MG/DL (ref 30–125)
MICROALBUMIN UR-MCNC: 4.6 MG/DL (ref 0–3)
MICROALBUMIN/CREAT UR-RTO: 10 MG/G (ref 0–30)

## 2018-05-03 RX ORDER — GLIPIZIDE 10 MG/1
10 TABLET ORAL 2 TIMES DAILY
Qty: 60 TAB | Refills: 2 | Status: SHIPPED | OUTPATIENT
Start: 2018-05-03 | End: 2018-10-25 | Stop reason: SDUPTHER

## 2018-05-03 NOTE — TELEPHONE ENCOUNTER
----- Message from Jose Rafael Petty MD sent at 5/3/2018  3:04 PM EDT -----  Please call this patient, inform him that his blood sugars have gone up since last checked. I am adding one medication for diabetes for him to take along with the metformin, I have sent and Rx for glipizide 10 mg twice a day to his pharmacy.     Dr. Bea Astudillo

## 2018-05-03 NOTE — PROGRESS NOTES
Please call this patient, inform him that his blood sugars have gone up since last checked. I am adding one medication for diabetes for him to take along with the metformin, I have sent and Rx for glipizide 10 mg twice a day to his pharmacy.     Dr. Lazaro Cee

## 2018-05-03 NOTE — TELEPHONE ENCOUNTER
Attempted to contact pt. Someone answers the call and then ends the call. Will try to contact pt again at another time.

## 2018-05-04 NOTE — TELEPHONE ENCOUNTER
Contacted pt. 2 pt identifiers confirmed. Pt notified of increased blood sugar since last visit. Pt notified of new medication, glipizide 10 mg, to take with his metformin. Pt advised to continue with metformin as usual and take glipizide twice a day. Pt verbalized understanding of all information. No further questions or concerns at this time.

## 2018-05-07 ENCOUNTER — OFFICE VISIT (OUTPATIENT)
Dept: ORTHOPEDIC SURGERY | Age: 48
End: 2018-05-07

## 2018-05-07 VITALS
RESPIRATION RATE: 15 BRPM | SYSTOLIC BLOOD PRESSURE: 145 MMHG | BODY MASS INDEX: 32.14 KG/M2 | DIASTOLIC BLOOD PRESSURE: 96 MMHG | HEIGHT: 69 IN | HEART RATE: 55 BPM | TEMPERATURE: 98.1 F | WEIGHT: 217 LBS

## 2018-05-07 DIAGNOSIS — M23.203 OLD COMPLEX TEAR OF MEDIAL MENISCUS OF RIGHT KNEE: ICD-10-CM

## 2018-05-07 DIAGNOSIS — M17.32 POST-TRAUMATIC OSTEOARTHRITIS OF LEFT KNEE: Primary | ICD-10-CM

## 2018-05-07 DIAGNOSIS — M23.41 LOOSE, BODY, JOINT, KNEE, RIGHT: ICD-10-CM

## 2018-05-07 RX ORDER — OXYCODONE AND ACETAMINOPHEN 5; 325 MG/1; MG/1
1 TABLET ORAL
Qty: 21 TAB | Refills: 0 | Status: SHIPPED | OUTPATIENT
Start: 2018-05-07 | End: 2018-06-28

## 2018-05-07 NOTE — MR AVS SNAPSHOT
[facility] 
 
 
 [address], [address] [address] 
[phone] Patient: Dusty Rm MRN: [id number] ALB:[birth year] Visit Information Date & Time Provider Department Dept. Phone Encounter #  
 5/7/2018  8:20 AM Coretta Doran, [address] [facility] - [phone] [id number] Follow-up Instructions Return if symptoms worsen or fail to improve. Routing History Your Appointments 8/2/2018  2:00 PM  
Office Visit with Ailyn Kowalski MD  
[facility]) Appt Note: 3 month f/u  
 [address]  
  
   
 [address] [facility] Upcoming Health Maintenance Date Due  
 FOOT EXAM Q1 2/5/1979 EYE EXAM RETINAL OR DILATED Q1 5/3/2018 MEDICARE YEARLY EXAM 5/4/2018 Influenza Age 5 to Adult 8/1/2018 HEMOGLOBIN A1C Q6M 11/2/2018 MICROALBUMIN Q1 5/2/2019 LIPID PANEL Q1 5/2/2019 DTaP/Tdap/Td series (2 - Td) 12/12/2026 Allergies as of 5/7/2018  Review Complete On: 5/7/2018 By: Coretta Doran DO Severity Noted Reaction Type Reactions Iodine High 04/01/2014    Anaphylaxis Norco [Hydrocodone-acetaminophen] High 12/12/2016    Nausea and Vomiting Gi distress Shellfish Derived High 07/31/2014    Swelling, Angioedema Other reaction(s): anaphylaxis/angioedema [address] AND CRAB Current Immunizations  Never Reviewed Name Date Influenza Vaccine 10/27/2014, 9/1/2014 Pneumococcal Polysaccharide (PPSV-23) 11/4/2014 12:17 PM, 10/31/2013 Not reviewed this visit You Were Diagnosed With   
  
 Codes Comments Post-traumatic osteoarthritis of left knee    -  Primary ICD-10-CM: M17.32 
ICD-9-CM: 715.26 Loose, body, joint, knee, right     ICD-10-CM: M23.41 
ICD-9-CM: 717.6 Old complex tear of medial meniscus of right knee     ICD-10-CM: M23.203 ICD-9-CM: 717.3 Vitals BP Pulse Temp Resp Height(growth percentile) Weight(growth percentile) (!) 145/96 (!) 55 98.1 °F (36.7 °C) 15 5' 9\" (1.753 m) 217 lb (98.4 kg) BMI Smoking Status 32.05 kg/m2 Former Smoker Vitals History BMI and BSA Data Body Mass Index Body Surface Area 32.05 kg/m 2 2.19 m 2 Preferred Pharmacy Pharmacy Name Phone [facility] [phone] Your Updated Medication List  
  
   
This list is accurate as of 5/7/18  9:14 AM.  Always use your most recent med list.  
  
  
  
  
 ADVAIR DISKUS 500-50 mcg/dose diskus inhaler Generic drug:  fluticasone-salmeterol Take 1 Puff by inhalation every twelve (12) hours. albuterol 90 mcg/actuation inhaler Commonly known as:  PROVENTIL HFA, VENTOLIN HFA, PROAIR HFA  
1-2 Puffs. amoxicillin 500 mg capsule Commonly known as:  AMOXIL Take 1 Cap by mouth two (2) times a day for 7 days. ASPIRIN PO Take 81 mg by mouth. cetirizine-psuedoePHEDrine 5-120 mg per tablet Commonly known as:  ZyrTEC-D Take 1 Tab by mouth two (2) times a day. cyclobenzaprine 10 mg tablet Commonly known as:  FLEXERIL Take one po qhs prn muscle spasms  
  
 docusate sodium 100 mg capsule Commonly known as:  Genice Flax Take one po daily prn constipation DULoxetine 30 mg capsule Commonly known as:  CYMBALTA TAKE 1 CAPSULE BY MOUTH EVERY MORNING  
  
 FISH OIL 60- mg Cap Generic drug:  omega 3-dha-epa-fish oil Take 500 mg by mouth daily. glipiZIDE 10 mg tablet Commonly known as:  Pilar Craw Take 1 Tab by mouth two (2) times a day. lidocaine 5 % ointment Commonly known as:  XYLOCAINE  
APPLY THIN FILM RUB EXTERNALLY TWICE DAILY IN THE MORNING AND EVENING  
  
 lisinopril 20 mg tablet Commonly known as:  PRINIVIL, ZESTRIL  
TAKE 1 TABLET BY MOUTH EVERY DAY AS DIRECTED  
  
 loratadine-pseudoephedrine  mg per tablet Commonly known as:  CLARITIN-D 24-hour Take 1 Tab by Mouth Once a Day. meloxicam 15 mg tablet Commonly known as:  MOBIC Take 1 Tab by mouth daily as needed for Pain (knees). metFORMIN ER 1,000 mg Tr24 TAKE 1 TABLET BY MOUTH 2 TIMES A DAY  
  
 metoprolol succinate 25 mg XL tablet Commonly known as:  TOPROL-XL Take 1 Tab by mouth daily. montelukast 10 mg tablet Commonly known as:  SINGULAIR Take 10 mg by mouth daily. NITROSTAT 0.4 mg SL tablet Generic drug:  nitroglycerin 0.4 mg by SubLINGual route every five (5) minutes as needed for Chest Pain. omeprazole 40 mg capsule Commonly known as:  PRILOSEC  
TAKE 1 CAPSULE BY MOUTH 2 TIMES A DAY  
  
 oxyCODONE-acetaminophen 5-325 mg per tablet Commonly known as:  PERCOCET Take 1 Tab by mouth every eight (8) hours as needed for Pain. Max Daily Amount: 3 Tabs. PARoxetine 20 mg tablet Commonly known as:  PAXIL  
  
 pravastatin 20 mg tablet Commonly known as:  PRAVACHOL Take 20 mg by mouth nightly. QUEtiapine 100 mg tablet Commonly known as:  SEROquel  
  
 tiotropium 18 mcg inhalation capsule Commonly known as:  Ottoniel Para Take 1 Cap by inhalation daily. Prescriptions Printed Refills  
 oxyCODONE-acetaminophen (PERCOCET) 5-325 mg per tablet 0 Sig: Take 1 Tab by mouth every eight (8) hours as needed for Pain. Max Daily Amount: 3 Tabs. Class: Print Route: Oral  
  
We Performed the Following REFERRAL TO ORTHOPEDICS [OMN153 Custom] Follow-up Instructions Return if symptoms worsen or fail to improve. Referral Information Referral ID Referred By Referred To  
  
 [id number] SAMMIE HASSAN [facility], [address] [address] [address]   
   [address] [place] [address], [address] Phone: [phone] Fax: [phone] Visits Status Start Date End Date 1 New Request 5/7/18 5/7/19 If your referral has a status of pending review or denied, additional information will be sent to support the outcome of this decision. Patient Instructions Meniscus Tear: Care Instructions Your Care Instructions The meniscus is rubbery tissue in the knee that acts as a shock absorber between the upper and lower leg bones. The meniscus also keeps your knee stable by spreading weight across it. Each knee has two menisci (plural of meniscus). You can tear a meniscus if you plant your foot and twist, or pivot. The meniscus also can wear down as you age, and it can tear from squatting or kneeling. Small tears may heal on their own with rest and some physical therapy. But a more serious tear may need surgery to repair it or to remove part of the meniscus. Your doctor may want you to see a doctor who specializes in bones and sports injuries. Follow-up care is a key part of your treatment and safety. Be sure to make and go to all appointments, and call your doctor if you are having problems. It's also a good idea to know your test results and keep a list of the medicines you take. How can you care for yourself at home? · Rest your knee when possible. · Do not squat or kneel. · Take pain medicines exactly as directed. ¨ If the doctor gave you a prescription medicine for pain, take it as prescribed. ¨ If you are not taking a prescription pain medicine, ask your doctor if you can take an over-the-counter medicine. · Put ice or a cold pack on your knee for 10 to 20 minutes at a time. Try to do this every 1 to 2 hours for the next 3 days (when you are awake) or until the swelling goes down. Put a thin cloth between the ice and your skin. · Prop up the sore leg on a pillow when you ice your knee or any time you sit or lie down during the next 3 days. Try to keep your leg above the level of your heart. This will help reduce swelling. · Follow your doctor's directions for using crutches or a knee brace, if suggested. · Follow your doctor's directions for exercises to keep your knee mobile and your leg muscles strong. Here are a few exercises you can try if your doctor says it is okay. ¨ Quad sets: Lie down on the floor or the bed with your injured leg straight. Fully extend your leg-there should be no or little bend in your knee. Tighten the thigh (quadriceps) of your injured leg for 6 seconds. Do not lift your heel up. Relax your quadriceps for 10 seconds. Repeat this exercise 8 to 12 times several times during the day. ¨ Straight-leg raises: Lie down on the floor or the bed with your injured leg flat and your uninjured leg bent so that the bottom of your foot is on the floor or bed. Tighten the quadriceps of your injured leg. Keeping your knee as straight as possible, lift your injured leg off the bed until it is about 18 inches above the bed or floor. Lower your leg back down and relax for 5 seconds. Do 3 sets of 20 repetitions, or if you tire quickly, 3 sets of 8 to 12 repetitions. ¨ Heel raises: Stand with your feet a few inches apart. Rest your hands lightly on a counter or chair in front of you. Slowly raise your heels off the floor while keeping your knees straight. Hold for 3 seconds, then slowly lower your heels to the floor. Do 3 sets of 8 to 12 repetitions. ¨ Heel slides: Lie down on the floor or the bed with your leg flat. Slowly begin to slide your heel toward your rear end (buttocks), keeping your heel on the floor. Your knee will begin to bend. Slide your heel and bend your knee until it becomes a little sore and you can feel a small amount of pressure inside your knee. Hold this position for 10 seconds. Slide your heel back down until your leg is straight on the floor. Relax for 10 seconds. Repeat this exercise 20 times. When should you call for help?  
Watch closely for changes in your health, and be sure to contact your doctor if: 
? · You have increasing knee pain or swelling or both. ? · Your knee is so sore or stiff that you cannot walk on it. ? · You do not get better as expected. Where can you learn more? Go to http://carlos a-fernando.info/. Enter A150 in the search box to learn more about \"Meniscus Tear: Care Instructions. \" Current as of: March 21, 2017 Content Version: 11.4 © 9977-7222 [employer]. Care instructions adapted under license by [employer] (which disclaims liability or warranty for this information). If you have questions about a medical condition or this instruction, always ask your healthcare professional. [address] any warranty or liability for your use of this information. Introducing \[facility]\"" & HEALTH SERVICES! Dear Maryann Montgomery: Thank you for requesting a [email or web address] account. Our records indicate that you already have an active [email or web address] account. You can access your account anytime at [email or web address] Did you know that you can access your hospital and ER discharge instructions at any time in [email or web address]? You can also review all of your test results from your hospital stay or ER visit. Additional Information If you have questions, please visit the Frequently Asked Questions section of the [email or web address] website at [email or web address] Remember, [email or web address] is NOT to be used for urgent needs. For medical emergencies, dial 911. Now available from your iPhone and Android! Please provide this summary of care documentation to your next provider. Your primary care clinician is listed as Tony Howard. If you have any questions after today's visit, please call [phone].

## 2018-05-07 NOTE — PROGRESS NOTES
HISTORY OF PRESENT ILLNESS    Monica Shields is a 51y.o. year old male comes in today to be evaluated and treated for: knee pain B/L    Last year was referred for issues with knee replacement from 2014 but ortho would not take insurance. Also has issues with loose joint body and meniscal tear from last March in right knee. Also a lot of issues with brother's death and now finally rready to get fixed up. Pain 7/10. = swell, no numb. Brace helps a little. IMAGING: MRI [id number] report:  1. Complex tear of the right knee medial meniscus involving the body and  posterior horn, extending to and involving the meniscal root. 2. Considerably truncated appearance to the lateral meniscus body and posterior  horn, the appearance of which is most in keeping with prior meniscectomy. 3. Tricompartmental right knee chondrosis as above, most pronounced in the  tibiofemoral compartments. 4. Large joint effusion with several intra-articular bodies present in the  posterior/dependent portion of the intercondylar notch. 5. Large multilocular Baker's cyst.    Social History     Social History    Marital status: SINGLE     Spouse name: N/A    Number of children: N/A    Years of education: N/A     Social History Main Topics    Smoking status: Former Smoker     Packs/day: 0.25    Smokeless tobacco: Never Used    Alcohol use No      Comment: \"a little\"    Drug use: No    Sexual activity: Yes     Partners: Female     Other Topics Concern    None     Social History Narrative     Current Outpatient Prescriptions   Medication Sig Dispense Refill    glipiZIDE (GLUCOTROL) 10 mg tablet Take 1 Tab by mouth two (2) times a day. 60 Tab 2    PARoxetine (PAXIL) 20 mg tablet       QUEtiapine (SEROQUEL) 100 mg tablet       meloxicam (MOBIC) 15 mg tablet Take 1 Tab by mouth daily as needed for Pain (knees). 90 Tab 1    amoxicillin (AMOXIL) 500 mg capsule Take 1 Cap by mouth two (2) times a day for 7 days.  14 Cap 0    cyclobenzaprine (FLEXERIL) 10 mg tablet Take one po qhs prn muscle spasms 15 Tab 0    docusate sodium (COLACE) 100 mg capsule Take one po daily prn constipation 90 Cap 3    metFORMIN ER 1,000 mg tr24 TAKE 1 TABLET BY MOUTH 2 TIMES A  Tab 3    metoprolol succinate (TOPROL-XL) 25 mg XL tablet Take 1 Tab by mouth daily. 90 Tab 3    lidocaine (XYLOCAINE) 5 % ointment APPLY THIN FILM RUB EXTERNALLY TWICE DAILY IN THE MORNING AND EVENING  3    lisinopril (PRINIVIL, ZESTRIL) 20 mg tablet TAKE 1 TABLET BY MOUTH EVERY DAY AS DIRECTED  10    omeprazole (PRILOSEC) 40 mg capsule TAKE 1 CAPSULE BY MOUTH 2 TIMES A DAY  10    albuterol (PROVENTIL HFA, VENTOLIN HFA, PROAIR HFA) 90 mcg/actuation inhaler 1-2 Puffs.  loratadine-pseudoephedrine (CLARITIN-D 24-HOUR)  mg per tablet Take 1 Tab by Mouth Once a Day.  pravastatin (PRAVACHOL) 20 mg tablet Take 20 mg by mouth nightly.  tiotropium (SPIRIVA) 18 mcg inhalation capsule Take 1 Cap by inhalation daily.  ASPIRIN PO Take 81 mg by mouth.  montelukast (SINGULAIR) 10 mg tablet Take 10 mg by mouth daily.  fluticasone-salmeterol (ADVAIR DISKUS) 500-50 mcg/dose diskus inhaler Take 1 Puff by inhalation every twelve (12) hours.  nitroglycerin (NITROSTAT) 0.4 mg SL tablet 0.4 mg by SubLINGual route every five (5) minutes as needed for Chest Pain.  cetirizine-psuedoePHEDrine (ZYRTEC-D) 5-120 mg per tablet Take 1 Tab by mouth two (2) times a day. 24 Tab 2    DULoxetine (CYMBALTA) 30 mg capsule TAKE 1 CAPSULE BY MOUTH EVERY MORNING 30 Cap 0    omega 3-dha-epa-fish oil (FISH OIL) 60- mg cap Take 500 mg by mouth daily.        Past Medical History:   Diagnosis Date    Advance directive discussed with patient 05/03/2017    Arthritis     left knee    Asthma     CAD (coronary artery disease)     heart attack 6/2012, Being followed by Dr. Rodriguez Mckay Cocaine use 2017    Depression     Diabetes ([facility] [address]     GERD (gastroesophageal reflux disease)     Hypertension     Psychiatric disorder     depression and schziophenia    Schizophrenia ([facility] [address]     Will be seeing [facility]    Unspecified sleep apnea     cpap machine,Will be seeing neurologist     Family History   Problem Relation Age of Onset    Anemia Mother     Cancer Father      prostate    Cancer Brother     Diabetes Brother          ROS:  + swell and loss ROM left knee    Objective:  GEN: Appears stated age in NAD. HEAD: Normocephalic, atraumatic. NEURO: Sensation intact light touch B/L lower extremities. MS: LE Strength +5/5 bilateral .   right Knee:  1+ Effusion . Lachman negative. Valgus positive B/L. Varus negative. positive joint line tenderness medial, posterior. Ashok positive. Posterior drawer positive. Noble compression test negative. Patellar apprehension negative. Patellar facet positive tender to palpation medial no crepitance right. Loss ROM left knee to 90 degrees active 110 passive. Left knee 3+ effusion  EXT: no clubbing/cyanosis. no edema. SKIN: Warm/dry without rash. Assessment/Plan:     ICD-10-CM ICD-9-CM    1. Post-traumatic osteoarthritis of left knee M17.32 715.26 oxyCODONE-acetaminophen (PERCOCET) 5-325 mg per tablet      REFERRAL TO ORTHOPEDICS   2. Loose, body, joint, knee, right M23.41 717.6 oxyCODONE-acetaminophen (PERCOCET) 5-325 mg per tablet      REFERRAL TO ORTHOPEDICS   3. Old complex tear of medial meniscus of right knee M23.203 717.3 oxyCODONE-acetaminophen (PERCOCET) 5-325 mg per tablet      REFERRAL TO ORTHOPEDICS       Patient verbalizes understanding of evaluation and plan. [facility] issues with left knee replacement and new right knee int derangement/LJB. I Rx 1 week percocet after review .

## 2018-05-07 NOTE — PATIENT INSTRUCTIONS
Meniscus Tear: Care Instructions  Your Care Instructions    The meniscus is rubbery tissue in the knee that acts as a shock absorber between the upper and lower leg bones. The meniscus also keeps your knee stable by spreading weight across it. Each knee has two menisci (plural of meniscus). You can tear a meniscus if you plant your foot and twist, or pivot. The meniscus also can wear down as you age, and it can tear from squatting or kneeling. Small tears may heal on their own with rest and some physical therapy. But a more serious tear may need surgery to repair it or to remove part of the meniscus. Your doctor may want you to see a doctor who specializes in bones and sports injuries. Follow-up care is a key part of your treatment and safety. Be sure to make and go to all appointments, and call your doctor if you are having problems. It's also a good idea to know your test results and keep a list of the medicines you take. How can you care for yourself at home? · Rest your knee when possible. · Do not squat or kneel. · Take pain medicines exactly as directed. ¨ If the doctor gave you a prescription medicine for pain, take it as prescribed. ¨ If you are not taking a prescription pain medicine, ask your doctor if you can take an over-the-counter medicine. · Put ice or a cold pack on your knee for 10 to 20 minutes at a time. Try to do this every 1 to 2 hours for the next 3 days (when you are awake) or until the swelling goes down. Put a thin cloth between the ice and your skin. · Prop up the sore leg on a pillow when you ice your knee or any time you sit or lie down during the next 3 days. Try to keep your leg above the level of your heart. This will help reduce swelling. · Follow your doctor's directions for using crutches or a knee brace, if suggested. · Follow your doctor's directions for exercises to keep your knee mobile and your leg muscles strong.  Here are a few exercises you can try if your doctor says it is okay. ¨ Quad sets: Lie down on the floor or the bed with your injured leg straight. Fully extend your leg-there should be no or little bend in your knee. Tighten the thigh (quadriceps) of your injured leg for 6 seconds. Do not lift your heel up. Relax your quadriceps for 10 seconds. Repeat this exercise 8 to 12 times several times during the day. ¨ Straight-leg raises: Lie down on the floor or the bed with your injured leg flat and your uninjured leg bent so that the bottom of your foot is on the floor or bed. Tighten the quadriceps of your injured leg. Keeping your knee as straight as possible, lift your injured leg off the bed until it is about 18 inches above the bed or floor. Lower your leg back down and relax for 5 seconds. Do 3 sets of 20 repetitions, or if you tire quickly, 3 sets of 8 to 12 repetitions. ¨ Heel raises: Stand with your feet a few inches apart. Rest your hands lightly on a counter or chair in front of you. Slowly raise your heels off the floor while keeping your knees straight. Hold for 3 seconds, then slowly lower your heels to the floor. Do 3 sets of 8 to 12 repetitions. ¨ Heel slides: Lie down on the floor or the bed with your leg flat. Slowly begin to slide your heel toward your rear end (buttocks), keeping your heel on the floor. Your knee will begin to bend. Slide your heel and bend your knee until it becomes a little sore and you can feel a small amount of pressure inside your knee. Hold this position for 10 seconds. Slide your heel back down until your leg is straight on the floor. Relax for 10 seconds. Repeat this exercise 20 times. When should you call for help? Watch closely for changes in your health, and be sure to contact your doctor if:  ? · You have increasing knee pain or swelling or both. ? · Your knee is so sore or stiff that you cannot walk on it. ? · You do not get better as expected. Where can you learn more?   Go to http://carlos a-fernando.info/. Enter B794 in the search box to learn more about \"Meniscus Tear: Care Instructions. \"  Current as of: March 21, 2017  Content Version: 11.4  © 6706-6130 [employer]. Care instructions adapted under license by [employer] (which disclaims liability or warranty for this information). If you have questions about a medical condition or this instruction, always ask your healthcare professional. [place] [address] any warranty or liability for your use of this information.

## 2018-05-24 ENCOUNTER — OFFICE VISIT (OUTPATIENT)
Dept: ORTHOPEDIC SURGERY | Facility: CLINIC | Age: 48
End: 2018-05-24

## 2018-05-24 VITALS
WEIGHT: 227.8 LBS | HEART RATE: 80 BPM | HEIGHT: 69 IN | DIASTOLIC BLOOD PRESSURE: 98 MMHG | TEMPERATURE: 97.2 F | RESPIRATION RATE: 16 BRPM | OXYGEN SATURATION: 96 % | BODY MASS INDEX: 33.74 KG/M2 | SYSTOLIC BLOOD PRESSURE: 157 MMHG

## 2018-05-24 DIAGNOSIS — G89.29 CHRONIC PAIN OF BOTH KNEES: Primary | ICD-10-CM

## 2018-05-24 DIAGNOSIS — M25.562 CHRONIC PAIN OF BOTH KNEES: Primary | ICD-10-CM

## 2018-05-24 DIAGNOSIS — M25.561 CHRONIC PAIN OF BOTH KNEES: Primary | ICD-10-CM

## 2018-05-24 DIAGNOSIS — S83.206D ACUTE MENISCAL TEAR OF RIGHT KNEE, SUBSEQUENT ENCOUNTER: ICD-10-CM

## 2018-05-24 DIAGNOSIS — M17.11 PRIMARY OSTEOARTHRITIS OF RIGHT KNEE: ICD-10-CM

## 2018-05-24 RX ORDER — TRAMADOL HYDROCHLORIDE 50 MG/1
50 TABLET ORAL
Qty: 21 TAB | Refills: 0 | Status: SHIPPED | OUTPATIENT
Start: 2018-05-24 | End: 2018-06-28

## 2018-05-24 NOTE — PROGRESS NOTES
Patient: Charla Gutierrez                MRN: [id number]       SSN: [id number]  YOB: 1970        AGE: 51 y.o. SEX: male  Body mass index is 33.64 kg/(m^2). PCP: Jose Maria Raymond MD  05/24/18    HISTORY: Ana Barkley is here today. I met him about a year and a half ago, and he has about four surgeries on his left knee. He ended up, I believe, with an [employer] knee replacement and stiffness. He is living with it. We are going to get some blood work for him. The right knee is really what is hurting him the most.  He has known mild to moderate arthritis and a displaced meniscal tear. He is getting a lot of mechanical symptoms, clgw on a daily basis. He has tried cortisone, which did not help him too much. He does not like needles too much either. 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 He moves his head and neck well. He reminds me he has had a heart attack but no recent chest pain. He has no complaints of hip pain. The hips rotate well. The left knee has a slight effusion. It is not hot or red. There are multiple incisions. It bends to about 90° minus a degree or two of extension. Mid-flexion stability is moderate but not severely so. The calf is nontender. There is slight evidence of neuropathy distally bilaterally. The right knee has a slight effusion and a positive Ashok's test with a click. There is some patellofemoral crepitus, mild, with terminal extension. Eduardo's sign is negative. There is no evidence for infection or DVT. RADIOGRAPHS:  I did review his MRI and his x-rays, and he certainly has moderate arthritis involving the right knee replacement also seen on AP, tunnel, lateral, and skyline of the x-rays, and he has a displaced medial meniscus tear.      PLAN:  At this point, I would recommend an arthroscopy for him and debridement followed with injections and then eventually, knee replacement. Hopefully, we will get three to five years more out of the knee prior to requiring a knee replacement. I think it is a little soon for the knee replacement. Risks and benefits have been described, and he elects to proceed. I want to get some infectious labs with regards to his left knee replacement, which were not previously completed. A prescription for Ultram was written as well. REVIEW OF SYSTEMS:      CON: negative for weight loss, fever  EYE: negative for double vision  ENT: negative for hoarseness  RS:   negative for Tb  GI:    negative for blood in stool  :  negative for blood in urine  Other systems reviewed and noted below. Past Medical History:   Diagnosis Date    Advance directive discussed with patient 05/03/2017    Arthritis     left knee    Asthma     CAD (coronary artery disease)     heart attack 6/2012, Being followed by Dr. Ladonna Carter Cocaine use 2017    Depression     Diabetes ([facility]     GERD (gastroesophageal reflux disease)     Hypertension     Psychiatric disorder     depression and schziophenia    Schizophrenia ([facility]     Will be seeing [employer]    Unspecified sleep apnea     cpap machine,Will be seeing neurologist       Family History   Problem Relation Age of Onset    Anemia Mother     Cancer Father      prostate    Cancer Brother     Diabetes Brother        Current Outpatient Prescriptions   Medication Sig Dispense Refill    glipiZIDE (GLUCOTROL) 10 mg tablet Take 1 Tab by mouth two (2) times a day. 60 Tab 2    PARoxetine (PAXIL) 20 mg tablet       QUEtiapine (SEROQUEL) 100 mg tablet       meloxicam (MOBIC) 15 mg tablet Take 1 Tab by mouth daily as needed for Pain (knees). 90 Tab 1    cetirizine-psuedoePHEDrine (ZYRTEC-D) 5-120 mg per tablet Take 1 Tab by mouth two (2) times a day.  24 Tab 2    DULoxetine (CYMBALTA) 30 mg capsule TAKE 1 CAPSULE BY MOUTH EVERY MORNING 30 Cap 0    docusate sodium (COLACE) 100 mg capsule Take one po daily prn constipation 90 Cap 3    metFORMIN ER 1,000 mg tr24 TAKE 1 TABLET BY MOUTH 2 TIMES A  Tab 3    metoprolol succinate (TOPROL-XL) 25 mg XL tablet Take 1 Tab by mouth daily. 90 Tab 3    lidocaine (XYLOCAINE) 5 % ointment APPLY THIN FILM RUB EXTERNALLY TWICE DAILY IN THE MORNING AND EVENING  3    lisinopril (PRINIVIL, ZESTRIL) 20 mg tablet TAKE 1 TABLET BY MOUTH EVERY DAY AS DIRECTED  10    omeprazole (PRILOSEC) 40 mg capsule TAKE 1 CAPSULE BY MOUTH 2 TIMES A DAY  10    albuterol (PROVENTIL HFA, VENTOLIN HFA, PROAIR HFA) 90 mcg/actuation inhaler 1-2 Puffs.  loratadine-pseudoephedrine (CLARITIN-D 24-HOUR)  mg per tablet Take 1 Tab by Mouth Once a Day.  omega 3-dha-epa-fish oil (FISH OIL) 60- mg cap Take 500 mg by mouth daily.  pravastatin (PRAVACHOL) 20 mg tablet Take 20 mg by mouth nightly.  tiotropium (SPIRIVA) 18 mcg inhalation capsule Take 1 Cap by inhalation daily.  ASPIRIN PO Take 81 mg by mouth.  montelukast (SINGULAIR) 10 mg tablet Take 10 mg by mouth daily.  fluticasone-salmeterol (ADVAIR DISKUS) 500-50 mcg/dose diskus inhaler Take 1 Puff by inhalation every twelve (12) hours.  nitroglycerin (NITROSTAT) 0.4 mg SL tablet 0.4 mg by SubLINGual route every five (5) minutes as needed for Chest Pain.  oxyCODONE-acetaminophen (PERCOCET) 5-325 mg per tablet Take 1 Tab by mouth every eight (8) hours as needed for Pain. Max Daily Amount: 3 Tabs.  21 Tab 0    cyclobenzaprine (FLEXERIL) 10 mg tablet Take one po qhs prn muscle spasms 15 Tab 0       Allergies   Allergen Reactions    Iodine Anaphylaxis    Norco [Hydrocodone-Acetaminophen] Nausea and Vomiting     Gi distress    Shellfish Derived Swelling and Angioedema     Other reaction(s): anaphylaxis/angioedema  [employer]       Past Surgical History:   Procedure Laterality Date    ABDOMEN SURGERY PROC UNLISTED      HX HEART CATHETERIZATION      cardiac cath    Igor Szymanski HERNIA REPAIR      HX KNEE REPLACEMENT      left TKR    HX ORTHOPAEDIC      L knee 1987, 1996    HX OTHER SURGICAL      eye sx at 2 yrs old       Social History     Social History    Marital status: SINGLE     Spouse name: N/A    Number of children: N/A    Years of education: N/A     Occupational History    Not on file. Social History Main Topics    Smoking status: Former Smoker     Packs/day: 0.25    Smokeless tobacco: Never Used    Alcohol use No      Comment: \"a little\"    Drug use: No    Sexual activity: Yes     Partners: Female     Other Topics Concern    Not on file     Social History Narrative       Visit Vitals    BP (!) 157/98    Pulse 80    Temp 97.2 °F (36.2 °C) (Oral)    Resp 16    Ht 5' 9\" (1.753 m)    Wt 227 lb 12.8 oz (103.3 kg)    SpO2 96%    BMI 33.64 kg/m2         PHYSICAL EXAMINATION:  GENERAL: Alert and oriented x3, in no acute distress, well-developed, well-nourished, afebrile. HEART: No JVD. EYES: No scleral icterus   NECK: No significant lymphadenopathy   LUNGS: No respiratory compromise or indrawing  ABDOMEN: Soft, non-tender, non-distended. Electronically signed by:  Miryam Weston MD

## 2018-06-18 DIAGNOSIS — Z01.818 PREOP EXAMINATION: Primary | ICD-10-CM

## 2018-06-18 DIAGNOSIS — M23.203 OLD PERIPHERAL TEAR OF MEDIAL MENISCUS OF RIGHT KNEE: ICD-10-CM

## 2018-06-21 ENCOUNTER — TELEPHONE (OUTPATIENT)
Dept: INTERNAL MEDICINE CLINIC | Age: 48
End: 2018-06-21

## 2018-06-21 NOTE — TELEPHONE ENCOUNTER
Please find out from patient if he still needs OV appt. For pre-op clearance for Arthroscopy of Right Knee. If so, please make appt. With me for next week.

## 2018-06-21 NOTE — LETTER
6/25/2018 10:00 AM 
 
Mr. Dawson Falk [address] [place] [id number] Re: Missed Appointment Dear Lyudmila/Sir: 
 
I hope this letter finds you well. I am a Licensed Practical Nurse with [facility] [phone] and I have attempted to contact you by phone, but was unsuccessful. Your good health is important to us, that is why we are sending you this friendly reminder. As always, our goal is to be your partner in life-long wellness. According to our records you have missed your follow up appointment. Please contact our office at the number listed above to reschedule appointment. Sincerely, Nena Toledo LPN

## 2018-06-22 NOTE — TELEPHONE ENCOUNTER
Attempted to contact tayla Cooney answered call. States she will get him to call back. Verbalized understanding.

## 2018-06-25 NOTE — TELEPHONE ENCOUNTER
Letter generated and will be sent to current address on file for pt to make appt. This encounter will now be closed.

## 2018-06-28 ENCOUNTER — HOSPITAL ENCOUNTER (EMERGENCY)
Age: 48
Discharge: HOME | End: 2018-06-28
Attending: EMERGENCY MEDICINE
Payer: MEDICARE

## 2018-06-28 VITALS
HEART RATE: 96 BPM | SYSTOLIC BLOOD PRESSURE: 158 MMHG | RESPIRATION RATE: 16 BRPM | TEMPERATURE: 98.9 F | DIASTOLIC BLOOD PRESSURE: 101 MMHG | OXYGEN SATURATION: 98 %

## 2018-06-28 DIAGNOSIS — R09.81 HEAD CONGESTION: ICD-10-CM

## 2018-06-28 DIAGNOSIS — R05.9 COUGH: ICD-10-CM

## 2018-06-28 DIAGNOSIS — B34.9 VIRAL ILLNESS: Primary | ICD-10-CM

## 2018-06-28 PROCEDURE — 99282 EMERGENCY DEPT VISIT SF MDM: CPT

## 2018-06-28 RX ORDER — FLUTICASONE PROPIONATE 50 MCG
2 SPRAY, SUSPENSION (ML) NASAL DAILY
Qty: 1 BOTTLE | Refills: 0 | Status: SHIPPED | OUTPATIENT
Start: 2018-06-28 | End: 2018-10-25

## 2018-06-28 RX ORDER — BENZONATATE 100 MG/1
100 CAPSULE ORAL
Qty: 30 CAP | Refills: 0 | Status: SHIPPED | OUTPATIENT
Start: 2018-06-28 | End: 2018-07-05

## 2018-06-28 RX ORDER — PSEUDOEPHEDRINE HYDROCHLORIDE 60 MG/1
60 TABLET ORAL
Qty: 20 TAB | Refills: 1 | Status: SHIPPED | OUTPATIENT
Start: 2018-06-28 | End: 2018-07-03

## 2018-06-28 NOTE — LETTER
NOTIFICATION RETURN TO WORK / SCHOOL 
 
6/28/2018 9:39 PM 
 
Mr. Ladi Villegas [address] [place] [phone] To Whom It May Concern: 
 
Ladi Villegas is currently under the care of [facility] EMERGENCY DEPT. He will return to work/school on: 7/1/18 If there are questions or concerns please have the patient contact our office.  
 
 
 
Sincerely, 
 
 
CHIKI Abad

## 2018-06-29 ENCOUNTER — TELEPHONE (OUTPATIENT)
Dept: INTERNAL MEDICINE CLINIC | Age: 48
End: 2018-06-29

## 2018-06-29 NOTE — DISCHARGE INSTRUCTIONS
Cough: Care Instructions  Your Care Instructions    A cough is your body's response to something that bothers your throat or airways. Many things can cause a cough. You might cough because of a cold or the flu, bronchitis, or asthma. Smoking, postnasal drip, allergies, and stomach acid that backs up into your throat also can cause coughs. A cough is a symptom, not a disease. Most coughs stop when the cause, such as a cold, goes away. You can take a few steps at home to cough less and feel better. Follow-up care is a key part of your treatment and safety. Be sure to make and go to all appointments, and call your doctor if you are having problems. It's also a good idea to know your test results and keep a list of the medicines you take. How can you care for yourself at home? · Drink lots of water and other fluids. This helps thin the mucus and soothes a dry or sore throat. Honey or lemon juice in hot water or tea may ease a dry cough. · Take cough medicine as directed by your doctor. · Prop up your head on pillows to help you breathe and ease a dry cough. · Try cough drops to soothe a dry or sore throat. Cough drops don't stop a cough. Medicine-flavored cough drops are no better than candy-flavored drops or hard candy. · Do not smoke. Avoid secondhand smoke. If you need help quitting, talk to your doctor about stop-smoking programs and medicines. These can increase your chances of quitting for good. When should you call for help? Call 911 anytime you think you may need emergency care. For example, call if:  ? · You have severe trouble breathing. ?Call your doctor now or seek immediate medical care if:  ? · You cough up blood. ? · You have new or worse trouble breathing. ? · You have a new or higher fever. ? · You have a new rash. ? Watch closely for changes in your health, and be sure to contact your doctor if:  ? · You cough more deeply or more often, especially if you notice more mucus or a change in the color of your mucus. ? · You have new symptoms, such as a sore throat, an earache, or sinus pain. ? · You do not get better as expected. Where can you learn more? Go to http://carlos a-fernando.info/. Enter D279 in the search box to learn more about \"Cough: Care Instructions. \"  Current as of: May 12, 2017  Content Version: 11.4  © 6206-1853 [employer]. Care instructions adapted under license by [employer] (which disclaims liability or warranty for this information). If you have questions about a medical condition or this instruction, always ask your healthcare professional. [address] any warranty or liability for your use of this information. Viral Infections: Care Instructions  Your Care Instructions    You don't feel well, but it's not clear what's causing it. You may have a viral infection. Viruses cause many illnesses, such as the common cold, influenza, fever, rashes, and the diarrhea, nausea, and vomiting that are often called \"stomach flu. \" You may wonder if antibiotic medicines could make you feel better. But antibiotics only treat infections caused by bacteria. They don't work on viruses. The good news is that viral infections usually aren't serious. Most will go away in a few days without medical treatment. In the meantime, there are a few things you can do to make yourself more comfortable. Follow-up care is a key part of your treatment and safety. Be sure to make and go to all appointments, and call your doctor if you are having problems. It's also a good idea to know your test results and keep a list of the medicines you take. How can you care for yourself at home? · Get plenty of rest if you feel tired. · Take an over-the-counter pain medicine if needed, such as acetaminophen (Tylenol), ibuprofen (Advil, Motrin), or naproxen (Aleve). Read and follow all instructions on the label.   · Be careful when taking over-the-counter cold or flu medicines and Tylenol at the same time. Many of these medicines have acetaminophen, which is Tylenol. Read the labels to make sure that you are not taking more than the recommended dose. Too much acetaminophen (Tylenol) can be harmful. · Drink plenty of fluids, enough so that your urine is light yellow or clear like water. If you have kidney, heart, or liver disease and have to limit fluids, talk with your doctor before you increase the amount of fluids you drink. · Stay home from work, school, and other public places while you have a fever. When should you call for help? Call 911 anytime you think you may need emergency care. For example, call if:  ? · You have severe trouble breathing. ? · You passed out (lost consciousness). ?Call your doctor now or seek immediate medical care if:  ? · You seem to be getting much sicker. ? · You have a new or higher fever. ? · You have blood in your stools. ? · You have new belly pain, or your pain gets worse. ? · You have a new rash. ? Watch closely for changes in your health, and be sure to contact your doctor if:  ? · You start to get better and then get worse. ? · You do not get better as expected. Where can you learn more? Go to http://carlos a-fernando.info/. Enter Z068 in the search box to learn more about \"Viral Infections: Care Instructions. \"  Current as of: March 3, 2017  Content Version: 11.4  © 1828-4904 [employer]. Care instructions adapted under license by [employer] (which disclaims liability or warranty for this information). If you have questions about a medical condition or this instruction, always ask your healthcare professional. [address] any warranty or liability for your use of this information.

## 2018-06-29 NOTE — TELEPHONE ENCOUNTER
Please call pt to set up a 30 minute appt. For [facility] ER f/u. Pt  went to ER for cough and congestion on 6/28/18.

## 2018-06-29 NOTE — ED PROVIDER NOTES
EMERGENCY DEPARTMENT HISTORY AND PHYSICAL EXAM    Date: 6/28/2018  Patient Name: Nilda Will    History of Presenting Illness     Chief Complaint   Patient presents with    Fever    Nasal Congestion         History Provided By: Patient    Chief Complaint: congestion, cough   Duration: 1 Days  Timing:  Acute  Location:   Quality: Pressure  Severity: Mild  Modifying Factors: no relief with OTC medications  Associated Symptoms: denies any other associated signs or symptoms      Additional History (Context): Nilda Will is a 51 y.o. male with diabetes, hypertension and schizophrenia, asthma, GERD who presents with complait of cough, congestion for 1 day. PT states he has a cold and OTC medication are not working. HE took Tylenol before arrival, states he had a fever at home. Denies CP, SOB, HA    PCP: Princess Godfrey MD    Current Outpatient Prescriptions   Medication Sig Dispense Refill    fluticasone (FLONASE) 50 mcg/actuation nasal spray 2 Sprays by Both Nostrils route daily. 1 Bottle 0    pseudoephedrine (SUDAFED) 60 mg tablet Take 1 Tab by mouth every six (6) hours as needed for Congestion for up to 5 days. 20 Tab 1    benzonatate (TESSALON PERLES) 100 mg capsule Take 1 Cap by mouth three (3) times daily as needed for Cough for up to 7 days. 30 Cap 0    glipiZIDE (GLUCOTROL) 10 mg tablet Take 1 Tab by mouth two (2) times a day. 60 Tab 2    PARoxetine (PAXIL) 20 mg tablet       QUEtiapine (SEROQUEL) 100 mg tablet       meloxicam (MOBIC) 15 mg tablet Take 1 Tab by mouth daily as needed for Pain (knees). 90 Tab 1    cyclobenzaprine (FLEXERIL) 10 mg tablet Take one po qhs prn muscle spasms 15 Tab 0    cetirizine-psuedoePHEDrine (ZYRTEC-D) 5-120 mg per tablet Take 1 Tab by mouth two (2) times a day.  24 Tab 2    DULoxetine (CYMBALTA) 30 mg capsule TAKE 1 CAPSULE BY MOUTH EVERY MORNING 30 Cap 0    docusate sodium (COLACE) 100 mg capsule Take one po daily prn constipation 90 Cap 3    metFORMIN ER 1,000 mg tr24 TAKE 1 TABLET BY MOUTH 2 TIMES A  Tab 3    metoprolol succinate (TOPROL-XL) 25 mg XL tablet Take 1 Tab by mouth daily. 90 Tab 3    lidocaine (XYLOCAINE) 5 % ointment APPLY THIN FILM RUB EXTERNALLY TWICE DAILY IN THE MORNING AND EVENING  3    lisinopril (PRINIVIL, ZESTRIL) 20 mg tablet TAKE 1 TABLET BY MOUTH EVERY DAY AS DIRECTED  10    omeprazole (PRILOSEC) 40 mg capsule TAKE 1 CAPSULE BY MOUTH 2 TIMES A DAY  10    albuterol (PROVENTIL HFA, VENTOLIN HFA, PROAIR HFA) 90 mcg/actuation inhaler 1-2 Puffs.  loratadine-pseudoephedrine (CLARITIN-D 24-HOUR)  mg per tablet Take 1 Tab by Mouth Once a Day.  omega 3-dha-epa-fish oil (FISH OIL) 60- mg cap Take 500 mg by mouth daily.  pravastatin (PRAVACHOL) 20 mg tablet Take 20 mg by mouth nightly.  tiotropium (SPIRIVA) 18 mcg inhalation capsule Take 1 Cap by inhalation daily.  ASPIRIN PO Take 81 mg by mouth.  montelukast (SINGULAIR) 10 mg tablet Take 10 mg by mouth daily.  fluticasone-salmeterol (ADVAIR DISKUS) 500-50 mcg/dose diskus inhaler Take 1 Puff by inhalation every twelve (12) hours.  nitroglycerin (NITROSTAT) 0.4 mg SL tablet 0.4 mg by SubLINGual route every five (5) minutes as needed for Chest Pain.          Past History     Past Medical History:  Past Medical History:   Diagnosis Date    Advance directive discussed with patient 05/03/2017    Arthritis     left knee    Asthma     CAD (coronary artery disease)     heart attack 6/2012, Being followed by Dr. Jane Vallecillo Cocaine use 2017    Depression     [facility] [address]     GERD (gastroesophageal reflux disease)     Hypertension     Psychiatric disorder     depression and schziophenia    Schizophrenia ([facility] [address]     Will be seeing [facility]    Unspecified sleep apnea     cpap machine,Will be seeing neurologist       Past Surgical History:  Past Surgical History:   Procedure Laterality Date    ABDOMEN SURGERY PROC UNLISTED      HX HEART CATHETERIZATION      cardiac cath     HX HERNIA REPAIR      HX KNEE REPLACEMENT      left TKR    HX ORTHOPAEDIC      L knee 1987, 1996    HX OTHER SURGICAL      eye sx at 2 yrs old       Family History:  Family History   Problem Relation Age of Onset    Anemia Mother     Cancer Father      prostate    Cancer Brother     Diabetes Brother        Social History:  Social History   Substance Use Topics    Smoking status: Former Smoker     Packs/day: 0.25    Smokeless tobacco: Never Used    Alcohol use No      Comment: \"a little\"       Allergies: Allergies   Allergen Reactions    Iodine Anaphylaxis    Norco [Hydrocodone-Acetaminophen] Nausea and Vomiting     Gi distress    Shellfish Derived Swelling and Angioedema     Other reaction(s): anaphylaxis/angioedema  [employer]         Review of Systems   Review of Systems  All Other Systems Negative  Physical Exam     Vitals:    06/28/18 2019   BP: (!) 158/101   Pulse: 96   Resp: 16   Temp: 98.9 °F (37.2 °C)   SpO2: 98%     Physical Exam           Diagnostic Study Results     Labs -   No results found for this or any previous visit (from the past 12 hour(s)). Radiologic Studies -   No orders to display     CT Results  (Last 48 hours)    None        CXR Results  (Last 48 hours)    None            Medical Decision Making   I am the first provider for this patient. I reviewed the vital signs, available nursing notes, past medical history, past surgical history, family history and social history. Vital Signs-Reviewed the patient's vital signs. Pulse Oximetry Analysis - 98% on RA      Records Reviewed: Old Medical Records    Procedures:  Procedures    Provider Notes (Medical Decision Making):     MED RECONCILIATION:  No current facility-administered medications for this encounter.       Current Outpatient Prescriptions   Medication Sig    fluticasone (FLONASE) 50 mcg/actuation nasal spray 2 Sprays by Both Nostrils route daily.    pseudoephedrine (SUDAFED) 60 mg tablet Take 1 Tab by mouth every six (6) hours as needed for Congestion for up to 5 days.  benzonatate (TESSALON PERLES) 100 mg capsule Take 1 Cap by mouth three (3) times daily as needed for Cough for up to 7 days.  glipiZIDE (GLUCOTROL) 10 mg tablet Take 1 Tab by mouth two (2) times a day.  PARoxetine (PAXIL) 20 mg tablet     QUEtiapine (SEROQUEL) 100 mg tablet     meloxicam (MOBIC) 15 mg tablet Take 1 Tab by mouth daily as needed for Pain (knees).  cyclobenzaprine (FLEXERIL) 10 mg tablet Take one po qhs prn muscle spasms    cetirizine-psuedoePHEDrine (ZYRTEC-D) 5-120 mg per tablet Take 1 Tab by mouth two (2) times a day.  DULoxetine (CYMBALTA) 30 mg capsule TAKE 1 CAPSULE BY MOUTH EVERY MORNING    docusate sodium (COLACE) 100 mg capsule Take one po daily prn constipation    metFORMIN ER 1,000 mg tr24 TAKE 1 TABLET BY MOUTH 2 TIMES A DAY    metoprolol succinate (TOPROL-XL) 25 mg XL tablet Take 1 Tab by mouth daily.  lidocaine (XYLOCAINE) 5 % ointment APPLY THIN FILM RUB EXTERNALLY TWICE DAILY IN THE MORNING AND EVENING    lisinopril (PRINIVIL, ZESTRIL) 20 mg tablet TAKE 1 TABLET BY MOUTH EVERY DAY AS DIRECTED    omeprazole (PRILOSEC) 40 mg capsule TAKE 1 CAPSULE BY MOUTH 2 TIMES A DAY    albuterol (PROVENTIL HFA, VENTOLIN HFA, PROAIR HFA) 90 mcg/actuation inhaler 1-2 Puffs.  loratadine-pseudoephedrine (CLARITIN-D 24-HOUR)  mg per tablet Take 1 Tab by Mouth Once a Day.  omega 3-dha-epa-fish oil (FISH OIL) 60- mg cap Take 500 mg by mouth daily.  pravastatin (PRAVACHOL) 20 mg tablet Take 20 mg by mouth nightly.  tiotropium (SPIRIVA) 18 mcg inhalation capsule Take 1 Cap by inhalation daily.  ASPIRIN PO Take 81 mg by mouth.  montelukast (SINGULAIR) 10 mg tablet Take 10 mg by mouth daily.  fluticasone-salmeterol (ADVAIR DISKUS) 500-50 mcg/dose diskus inhaler Take 1 Puff by inhalation every twelve (12) hours.     nitroglycerin (NITROSTAT) 0.4 mg SL tablet 0.4 mg by SubLINGual route every five (5) minutes as needed for Chest Pain. Disposition:  discharge    DISCHARGE NOTE:     Pt has been reexamined. Patient has no new complaints, changes, or physical findings. Care plan outlined and precautions discussed. Results of exam were reviewed with the patient. All medications were reviewed with the patient; will d/c home with symptom relief. All of pt's questions and concerns were addressed. Patient was instructed and agrees to follow up with PCP, as well as to return to the ED upon further deterioration. Patient is ready to go home. Follow-up Information     Follow up With Details Comments Contact Info    Tony Argueta MD Call As needed, follow up [address]  [address] EMERGENCY DEPT  If symptoms worsen [address] [facility].  [phone]          Current Discharge Medication List      START taking these medications    Details   fluticasone (FLONASE) 50 mcg/actuation nasal spray 2 Sprays by Both Nostrils route daily. Qty: 1 Bottle, Refills: 0      pseudoephedrine (SUDAFED) 60 mg tablet Take 1 Tab by mouth every six (6) hours as needed for Congestion for up to 5 days. Qty: 20 Tab, Refills: 1      benzonatate (TESSALON PERLES) 100 mg capsule Take 1 Cap by mouth three (3) times daily as needed for Cough for up to 7 days. Qty: 30 Cap, Refills: 0         CONTINUE these medications which have NOT CHANGED    Details   glipiZIDE (GLUCOTROL) 10 mg tablet Take 1 Tab by mouth two (2) times a day. Qty: 60 Tab, Refills: 2      PARoxetine (PAXIL) 20 mg tablet       QUEtiapine (SEROQUEL) 100 mg tablet       meloxicam (MOBIC) 15 mg tablet Take 1 Tab by mouth daily as needed for Pain (knees).   Qty: 90 Tab, Refills: 1    Associated Diagnoses: Chronic pain of both knees      cyclobenzaprine (FLEXERIL) 10 mg tablet Take one po qhs prn muscle spasms  Qty: 15 Tab, Refills: 0    Associated Diagnoses: Muscle spasm of back      cetirizine-psuedoePHEDrine (ZYRTEC-D) 5-120 mg per tablet Take 1 Tab by mouth two (2) times a day. Qty: 24 Tab, Refills: 2    Associated Diagnoses: Left otitis media, unspecified otitis media type      DULoxetine (CYMBALTA) 30 mg capsule TAKE 1 CAPSULE BY MOUTH EVERY MORNING  Qty: 30 Cap, Refills: 0    Associated Diagnoses: Depression, unspecified depression type      docusate sodium (COLACE) 100 mg capsule Take one po daily prn constipation  Qty: 90 Cap, Refills: 3    Associated Diagnoses: Constipation, unspecified constipation type      metFORMIN ER 1,000 mg tr24 TAKE 1 TABLET BY MOUTH 2 TIMES A DAY  Qty: 180 Tab, Refills: 3    Associated Diagnoses: Diabetes mellitus, stable (HCC)      metoprolol succinate (TOPROL-XL) 25 mg XL tablet Take 1 Tab by mouth daily. Qty: 90 Tab, Refills: 3    Associated Diagnoses: Medication refill      lidocaine (XYLOCAINE) 5 % ointment APPLY THIN FILM RUB EXTERNALLY TWICE DAILY IN THE MORNING AND EVENING  Refills: 3      lisinopril (PRINIVIL, ZESTRIL) 20 mg tablet TAKE 1 TABLET BY MOUTH EVERY DAY AS DIRECTED  Refills: 10      omeprazole (PRILOSEC) 40 mg capsule TAKE 1 CAPSULE BY MOUTH 2 TIMES A DAY  Refills: 10      albuterol (PROVENTIL HFA, VENTOLIN HFA, PROAIR HFA) 90 mcg/actuation inhaler 1-2 Puffs. loratadine-pseudoephedrine (CLARITIN-D 24-HOUR)  mg per tablet Take 1 Tab by Mouth Once a Day. omega 3-dha-epa-fish oil (FISH OIL) 60- mg cap Take 500 mg by mouth daily. pravastatin (PRAVACHOL) 20 mg tablet Take 20 mg by mouth nightly. tiotropium (SPIRIVA) 18 mcg inhalation capsule Take 1 Cap by inhalation daily. ASPIRIN PO Take 81 mg by mouth.      montelukast (SINGULAIR) 10 mg tablet Take 10 mg by mouth daily. fluticasone-salmeterol (ADVAIR DISKUS) 500-50 mcg/dose diskus inhaler Take 1 Puff by inhalation every twelve (12) hours.       nitroglycerin (NITROSTAT) 0.4 mg SL tablet 0.4 mg by SubLINGual route every five (5) minutes as needed for Chest Pain. STOP taking these medications       traMADol (ULTRAM) 50 mg tablet Comments:   Reason for Stopping:         oxyCODONE-acetaminophen (PERCOCET) 5-325 mg per tablet Comments:   Reason for Stopping:                 Diagnosis     Clinical Impression:   1. Viral illness    2. Cough    3.  Head congestion

## 2018-07-02 ENCOUNTER — HOSPITAL ENCOUNTER (OUTPATIENT)
Dept: PREADMISSION TESTING | Age: 48
Discharge: HOME | End: 2018-07-02
Payer: MEDICARE

## 2018-07-02 ENCOUNTER — HOSPITAL ENCOUNTER (OUTPATIENT)
Dept: GENERAL RADIOLOGY | Age: 48
Discharge: HOME | End: 2018-07-02
Payer: MEDICARE

## 2018-07-02 DIAGNOSIS — Z01.818 PREOP EXAMINATION: ICD-10-CM

## 2018-07-02 DIAGNOSIS — M23.203 OLD PERIPHERAL TEAR OF MEDIAL MENISCUS OF RIGHT KNEE: ICD-10-CM

## 2018-07-02 LAB
ABO + RH BLD: NORMAL
ALBUMIN SERPL-MCNC: 3.4 G/DL (ref 3.4–5)
AMPHET UR QL SCN: NEGATIVE
ANION GAP SERPL CALC-SCNC: 5 MMOL/L (ref 3–18)
APPEARANCE UR: CLEAR
APTT PPP: 27.3 SEC (ref 23–36.4)
ATRIAL RATE: 67 BPM
BARBITURATES UR QL SCN: NEGATIVE
BASOPHILS # BLD: 0.1 K/UL (ref 0–0.06)
BASOPHILS NFR BLD: 1 % (ref 0–2)
BENZODIAZ UR QL: NEGATIVE
BILIRUB UR QL: NEGATIVE
BLOOD GROUP ANTIBODIES SERPL: NORMAL
BUN SERPL-MCNC: 10 MG/DL (ref 7–18)
BUN/CREAT SERPL: 10 (ref 12–20)
CALCIUM SERPL-MCNC: 8.8 MG/DL (ref 8.5–10.1)
CALCULATED P AXIS, ECG09: 28 DEGREES
CALCULATED R AXIS, ECG10: -22 DEGREES
CALCULATED T AXIS, ECG11: -37 DEGREES
CANNABINOIDS UR QL SCN: POSITIVE
CHLORIDE SERPL-SCNC: 109 MMOL/L (ref 100–108)
CO2 SERPL-SCNC: 32 MMOL/L (ref 21–32)
COCAINE UR QL SCN: NEGATIVE
COLOR UR: YELLOW
CREAT SERPL-MCNC: 1.02 MG/DL (ref 0.6–1.3)
DIAGNOSIS, 93000: NORMAL
DIFFERENTIAL METHOD BLD: ABNORMAL
EOSINOPHIL # BLD: 0.2 K/UL (ref 0–0.4)
EOSINOPHIL NFR BLD: 3 % (ref 0–5)
ERYTHROCYTE [DISTWIDTH] IN BLOOD BY AUTOMATED COUNT: 13.6 % (ref 11.6–14.5)
EST. AVERAGE GLUCOSE BLD GHB EST-MCNC: 171 MG/DL
GLUCOSE SERPL-MCNC: 111 MG/DL (ref 74–99)
GLUCOSE UR STRIP.AUTO-MCNC: NEGATIVE MG/DL
HBA1C MFR BLD: 7.6 % (ref 4.2–5.6)
HCT VFR BLD AUTO: 42.3 % (ref 36–48)
HDSCOM,HDSCOM: ABNORMAL
HGB BLD-MCNC: 13.7 G/DL (ref 13–16)
HGB UR QL STRIP: NEGATIVE
INR PPP: 1 (ref 0.8–1.2)
KETONES UR QL STRIP.AUTO: NEGATIVE MG/DL
LEUKOCYTE ESTERASE UR QL STRIP.AUTO: NEGATIVE
LYMPHOCYTES # BLD: 1.8 K/UL (ref 0.9–3.6)
LYMPHOCYTES NFR BLD: 35 % (ref 21–52)
MCH RBC QN AUTO: 28.5 PG (ref 24–34)
MCHC RBC AUTO-ENTMCNC: 32.4 G/DL (ref 31–37)
MCV RBC AUTO: 87.9 FL (ref 74–97)
METHADONE UR QL: NEGATIVE
MONOCYTES # BLD: 0.4 K/UL (ref 0.05–1.2)
MONOCYTES NFR BLD: 8 % (ref 3–10)
NEUTS SEG # BLD: 2.7 K/UL (ref 1.8–8)
NEUTS SEG NFR BLD: 53 % (ref 40–73)
NITRITE UR QL STRIP.AUTO: NEGATIVE
OPIATES UR QL: NEGATIVE
P-R INTERVAL, ECG05: 138 MS
PCP UR QL: NEGATIVE
PH UR STRIP: 5.5 [PH] (ref 5–8)
PLATELET # BLD AUTO: 249 K/UL (ref 135–420)
PMV BLD AUTO: 9.5 FL (ref 9.2–11.8)
POTASSIUM SERPL-SCNC: 3.8 MMOL/L (ref 3.5–5.5)
PROT UR STRIP-MCNC: NEGATIVE MG/DL
PROTHROMBIN TIME: 12.3 SEC (ref 11.5–15.2)
Q-T INTERVAL, ECG07: 412 MS
QRS DURATION, ECG06: 90 MS
QTC CALCULATION (BEZET), ECG08: 435 MS
RBC # BLD AUTO: 4.81 M/UL (ref 4.7–5.5)
SODIUM SERPL-SCNC: 146 MMOL/L (ref 136–145)
SP GR UR REFRACTOMETRY: 1.02 (ref 1–1.03)
SPECIMEN EXP DATE BLD: NORMAL
UROBILINOGEN UR QL STRIP.AUTO: 1 EU/DL (ref 0.2–1)
VENTRICULAR RATE, ECG03: 67 BPM
WBC # BLD AUTO: 5.1 K/UL (ref 4.6–13.2)

## 2018-07-02 PROCEDURE — 85610 PROTHROMBIN TIME: CPT | Performed by: PHYSICIAN ASSISTANT

## 2018-07-02 PROCEDURE — 81003 URINALYSIS AUTO W/O SCOPE: CPT | Performed by: PHYSICIAN ASSISTANT

## 2018-07-02 PROCEDURE — 83036 HEMOGLOBIN GLYCOSYLATED A1C: CPT | Performed by: PHYSICIAN ASSISTANT

## 2018-07-02 PROCEDURE — 71046 X-RAY EXAM CHEST 2 VIEWS: CPT

## 2018-07-02 PROCEDURE — 36415 COLL VENOUS BLD VENIPUNCTURE: CPT | Performed by: PHYSICIAN ASSISTANT

## 2018-07-02 PROCEDURE — 86900 BLOOD TYPING SEROLOGIC ABO: CPT | Performed by: PHYSICIAN ASSISTANT

## 2018-07-02 PROCEDURE — 87077 CULTURE AEROBIC IDENTIFY: CPT | Performed by: PHYSICIAN ASSISTANT

## 2018-07-02 PROCEDURE — 80048 BASIC METABOLIC PNL TOTAL CA: CPT | Performed by: PHYSICIAN ASSISTANT

## 2018-07-02 PROCEDURE — 93005 ELECTROCARDIOGRAM TRACING: CPT

## 2018-07-02 PROCEDURE — 80307 DRUG TEST PRSMV CHEM ANLYZR: CPT | Performed by: PHYSICIAN ASSISTANT

## 2018-07-02 PROCEDURE — 85025 COMPLETE CBC W/AUTO DIFF WBC: CPT | Performed by: PHYSICIAN ASSISTANT

## 2018-07-02 PROCEDURE — 85730 THROMBOPLASTIN TIME PARTIAL: CPT | Performed by: PHYSICIAN ASSISTANT

## 2018-07-02 PROCEDURE — 87086 URINE CULTURE/COLONY COUNT: CPT | Performed by: PHYSICIAN ASSISTANT

## 2018-07-02 PROCEDURE — 87186 SC STD MICRODIL/AGAR DIL: CPT | Performed by: PHYSICIAN ASSISTANT

## 2018-07-02 PROCEDURE — 82040 ASSAY OF SERUM ALBUMIN: CPT | Performed by: PHYSICIAN ASSISTANT

## 2018-07-03 ENCOUNTER — TELEPHONE (OUTPATIENT)
Dept: ORTHOPEDIC SURGERY | Facility: CLINIC | Age: 48
End: 2018-07-03

## 2018-07-03 DIAGNOSIS — N39.0 URINARY TRACT INFECTION WITHOUT HEMATURIA, SITE UNSPECIFIED: Primary | ICD-10-CM

## 2018-07-03 RX ORDER — LEVOFLOXACIN 750 MG/1
750 TABLET ORAL DAILY
Qty: 5 TAB | Refills: 0 | Status: CANCELLED | OUTPATIENT
Start: 2018-07-03

## 2018-07-04 LAB
BACTERIA SPEC CULT: ABNORMAL
SERVICE CMNT-IMP: ABNORMAL

## 2018-07-09 ENCOUNTER — OFFICE VISIT (OUTPATIENT)
Dept: CARDIOLOGY CLINIC | Age: 48
End: 2018-07-09

## 2018-07-09 VITALS
HEIGHT: 69 IN | BODY MASS INDEX: 32.14 KG/M2 | DIASTOLIC BLOOD PRESSURE: 78 MMHG | HEART RATE: 71 BPM | SYSTOLIC BLOOD PRESSURE: 138 MMHG | WEIGHT: 217 LBS

## 2018-07-09 DIAGNOSIS — E11.9 TYPE 2 DIABETES MELLITUS WITHOUT COMPLICATION, WITHOUT LONG-TERM CURRENT USE OF INSULIN (HCC): ICD-10-CM

## 2018-07-09 DIAGNOSIS — I11.9 HYPERTENSIVE HEART DISEASE WITHOUT HEART FAILURE: Primary | ICD-10-CM

## 2018-07-09 DIAGNOSIS — R94.31 ABNORMAL EKG: ICD-10-CM

## 2018-07-09 DIAGNOSIS — E78.5 HYPERLIPIDEMIA, UNSPECIFIED HYPERLIPIDEMIA TYPE: ICD-10-CM

## 2018-07-09 DIAGNOSIS — Z76.0 MEDICATION REFILL: ICD-10-CM

## 2018-07-09 DIAGNOSIS — Z01.810 PRE-OPERATIVE CARDIOVASCULAR EXAMINATION: ICD-10-CM

## 2018-07-09 DIAGNOSIS — I11.9 HYPERTENSIVE LEFT VENTRICULAR HYPERTROPHY, WITHOUT HEART FAILURE: ICD-10-CM

## 2018-07-09 RX ORDER — METOPROLOL SUCCINATE 25 MG/1
25 TABLET, EXTENDED RELEASE ORAL DAILY
Qty: 90 TAB | Refills: 1 | Status: SHIPPED | OUTPATIENT
Start: 2018-07-09 | End: 2018-10-25 | Stop reason: SDUPTHER

## 2018-07-09 RX ORDER — LISINOPRIL 20 MG/1
20 TABLET ORAL DAILY
Qty: 90 TAB | Refills: 1 | Status: SHIPPED | OUTPATIENT
Start: 2018-07-09 | End: 2018-10-25 | Stop reason: SDUPTHER

## 2018-07-09 NOTE — LETTER
Patricia Francis 1970 7/9/2018 Dear MD Deena Marks MD 
 
I had the pleasure of evaluating  Mr. Dilia Oneal in office today. Below are the relevant portions of my assessment and plan of care. ICD-10-CM ICD-9-CM 1. Hypertensive heart disease without heart failure I11.9 402.90 ECHO ADULT COMPLETE  
   NUCLEAR CARDIAC STRESS TEST Blood pressure control. LVH on EKG. 2. Hypertensive left ventricular hypertrophy, without heart failure I11.9 402.90 Mild LVH on last echo. 3. Abnormal EKG R94.31 794.31 ECHO ADULT COMPLETE  
   NUCLEAR CARDIAC STRESS TEST Chronic ST-T changes noted likely from LVH. Prior cardiac workup includes stress test and cardiac catheterization. Reviewed 4. Pre-operative cardiovascular examination Z01.810 V72.81 We will set up echocardiogram exam and nuclear stress test to assess for ischemia and LV function prior to surgical intervention 5. Type 2 diabetes mellitus without complication, without long-term current use of insulin (HCC) E11.9 250.00 On treatment 6. Medication refill Z76.0 V68.1 metoprolol succinate (TOPROL-XL) 25 mg XL tablet 7. Hyperlipidemia, unspecified hyperlipidemia type E78.5 272.4   
 cramps with pravastatin. Consider alternate therapy after surgery Current Outpatient Prescriptions Medication Sig Dispense Refill  metoprolol succinate (TOPROL-XL) 25 mg XL tablet Take 1 Tab by mouth daily. 90 Tab 1  
 lisinopril (PRINIVIL, ZESTRIL) 20 mg tablet Take 1 Tab by mouth daily. 90 Tab 1  
 fluticasone (FLONASE) 50 mcg/actuation nasal spray 2 Sprays by Both Nostrils route daily. 1 Bottle 0  
 glipiZIDE (GLUCOTROL) 10 mg tablet Take 1 Tab by mouth two (2) times a day. 60 Tab 2  
 PARoxetine (PAXIL) 20 mg tablet  QUEtiapine (SEROQUEL) 100 mg tablet  cyclobenzaprine (FLEXERIL) 10 mg tablet Take one po qhs prn muscle spasms 15 Tab 0  
  cetirizine-psuedoePHEDrine (ZYRTEC-D) 5-120 mg per tablet Take 1 Tab by mouth two (2) times a day. 24 Tab 2  
 docusate sodium (COLACE) 100 mg capsule Take one po daily prn constipation 90 Cap 3  
 metFORMIN ER 1,000 mg tr24 TAKE 1 TABLET BY MOUTH 2 TIMES A  Tab 3  
 lidocaine (XYLOCAINE) 5 % ointment APPLY THIN FILM RUB EXTERNALLY TWICE DAILY IN THE MORNING AND EVENING  3  
 omeprazole (PRILOSEC) 40 mg capsule as needed  10  
 albuterol (PROVENTIL HFA, VENTOLIN HFA, PROAIR HFA) 90 mcg/actuation inhaler 1-2 Puffs.  loratadine-pseudoephedrine (CLARITIN-D 24-HOUR)  mg per tablet Take 1 Tab by Mouth Once a Day.  tiotropium (SPIRIVA) 18 mcg inhalation capsule Take 1 Cap by inhalation daily.  ASPIRIN PO Take 81 mg by mouth.  montelukast (SINGULAIR) 10 mg tablet Take 10 mg by mouth daily.  fluticasone-salmeterol (ADVAIR DISKUS) 500-50 mcg/dose diskus inhaler Take 1 Puff by inhalation every twelve (12) hours.  nitroglycerin (NITROSTAT) 0.4 mg SL tablet 0.4 mg by SubLINGual route every five (5) minutes as needed for Chest Pain.  meloxicam (MOBIC) 15 mg tablet Take 1 Tab by mouth daily as needed for Pain (knees). 90 Tab 1  DULoxetine (CYMBALTA) 30 mg capsule TAKE 1 CAPSULE BY MOUTH EVERY MORNING 30 Cap 0  
 omega 3-dha-epa-fish oil (FISH OIL) 60- mg cap Take 500 mg by mouth daily.  pravastatin (PRAVACHOL) 20 mg tablet Take 20 mg by mouth nightly. Orders Placed This Encounter  metoprolol succinate (TOPROL-XL) 25 mg XL tablet Sig: Take 1 Tab by mouth daily. Dispense:  90 Tab Refill:  1  
 lisinopril (PRINIVIL, ZESTRIL) 20 mg tablet Sig: Take 1 Tab by mouth daily. Dispense:  90 Tab Refill:  1 If you have questions, please do not hesitate to call me. I look forward to following  Dayana Romeroamber along with you. Sincerely, Natanael Harmon MD

## 2018-07-09 NOTE — PROGRESS NOTES
HISTORY OF PRESENT ILLNESS  Elena Naranjo is a 51 y.o. male. HPI Comments: Patient is here for preoperative cardiac assessment. He had abnormal EKG suggestive of ischemia. Patient is asymptomatic with no complaint of chest pain,  dizziness, palpitation, edema, or other associated symptoms. Patient reports complaint of shortness of breath on exertion. Denies any orthopnea PND or peripheral edema    New Patient   The history is provided by the patient. Associated symptoms include shortness of breath. Pertinent negatives include no chest pain, no abdominal pain and no headaches. Pre-op Exam   The history is provided by the patient. This is a new problem. The problem occurs constantly. The problem has not changed since onset. Associated symptoms include shortness of breath. Pertinent negatives include no chest pain, no abdominal pain and no headaches. Abnormal Cardiac Test   The history is provided by the patient. This is a new problem. The problem occurs constantly. The problem has not changed since onset. Associated symptoms include shortness of breath. Pertinent negatives include no chest pain, no abdominal pain and no headaches. Nothing aggravates the symptoms. Nothing relieves the symptoms. Review of Systems   Constitutional: Negative for chills and fever. HENT: Negative for nosebleeds. Eyes: Negative for blurred vision and double vision. Respiratory: Positive for shortness of breath. Negative for cough, hemoptysis, sputum production and wheezing. Cardiovascular: Negative for chest pain, palpitations, orthopnea, claudication, leg swelling and PND. Gastrointestinal: Negative for abdominal pain, heartburn, nausea and vomiting. Musculoskeletal: Negative for myalgias. Skin: Negative for rash. Neurological: Negative for dizziness, weakness and headaches. Endo/Heme/Allergies: Does not bruise/bleed easily.      Family History   Problem Relation Age of Onset    Anemia Mother     Cancer Father      prostate    Cancer Brother     Diabetes Brother        Past Medical History:   Diagnosis Date    Abnormal EKG 7/9/2018    Chronic ST-T changes noted likely from LVH. Prior cardiac workup includes stress test and cardiac catheterization. Reviewed    Advance directive discussed with patient 05/03/2017    Arthritis     left knee    Asthma     CAD (coronary artery disease)     heart attack 6/2012, Being followed by Dr. Triana Ponce Cocaine use 2017    Depression     Diabetes ([address]     GERD (gastroesophageal reflux disease)     Hyperlipidemia 7/9/2018    cramps with pravastatin. Consider alternate therapy after surgery    Hypertension     Hypertensive heart disease without heart failure 7/9/2018    Blood pressure control. LVH on EKG.  Hypertensive left ventricular hypertrophy, without heart failure 7/9/2018    Mild LVH on last echo.  Medication refill 7/9/2018    Pre-operative cardiovascular examination 7/9/2018    Knee surgery. Stable cardiac status.   Okay to proceed with moderate cardiac risk    Psychiatric disorder     depression and schziophenia    Schizophrenia ([address]     Will be seeing [employer]    Type 2 diabetes mellitus without complication, without long-term current use of insulin ([facility] 7/9/2018    On treatment    Unspecified sleep apnea     cpap machine,Will be seeing neurologist       Past Surgical History:   Procedure Laterality Date    ABDOMEN SURGERY [facility] [address] HX COLONOSCOPY      HX HEART CATHETERIZATION  2014    cardiac cath - normal coronaries    HX HERNIA REPAIR      HX KNEE REPLACEMENT      left TKR    HX ORTHOPAEDIC      L knee 1987, 1996    HX OTHER SURGICAL      eye sx at 2 yrs old       Social History   Substance Use Topics    Smoking status: Current Every Day Smoker     Packs/day: 0.25     Types: Cigars    Smokeless tobacco: Never Used    Alcohol use Yes      Comment: \"a little\"/ occ       Allergies   Allergen Reactions    Iodine Anaphylaxis    Norco [Hydrocodone-Acetaminophen] Nausea and Vomiting     Gi distress    Shellfish Derived Swelling and Angioedema     Other reaction(s): anaphylaxis/angioedema  [employer]       Prior to Admission medications    Medication Sig Start Date End Date Taking? Authorizing Provider   metoprolol succinate (TOPROL-XL) 25 mg XL tablet Take 1 Tab by mouth daily. 7/9/18  Yes Anna Olvera MD   lisinopril (PRINIVIL, ZESTRIL) 20 mg tablet Take 1 Tab by mouth daily. 7/9/18  Yes Anna Olvera MD   fluticasone (FLONASE) 50 mcg/actuation nasal spray 2 Sprays by Both Nostrils route daily. 6/28/18  Yes CHIKI Oseguera   glipiZIDE (GLUCOTROL) 10 mg tablet Take 1 Tab by mouth two (2) times a day. 5/3/18  Yes Brad Pretty MD   PARoxetine (PAXIL) 20 mg tablet  4/30/18  Yes Historical Provider   QUEtiapine (SEROQUEL) 100 mg tablet  4/30/18  Yes Historical Provider   cyclobenzaprine (FLEXERIL) 10 mg tablet Take one po qhs prn muscle spasms 5/2/18  Yes Brad Pretty MD   cetirizine-psuedoePHEDrine (ZYRTEC-D) 5-120 mg per tablet Take 1 Tab by mouth two (2) times a day. 5/2/18  Yes Brad Pretty MD   docusate sodium (COLACE) 100 mg capsule Take one po daily prn constipation 6/15/17  Yes Tony Howard MD   metFORMIN ER 1,000 mg tr24 TAKE 1 TABLET BY MOUTH 2 TIMES A DAY 5/3/17  Yes Tony Howard MD   lidocaine (XYLOCAINE) 5 % ointment APPLY THIN FILM RUB EXTERNALLY TWICE DAILY IN THE MORNING AND EVENING 12/27/16  Yes Historical Provider   omeprazole (PRILOSEC) 40 mg capsule as needed 12/28/16  Yes Historical Provider   albuterol (PROVENTIL HFA, VENTOLIN HFA, PROAIR HFA) 90 mcg/actuation inhaler 1-2 Puffs. 10/12/16  Yes Historical Provider   loratadine-pseudoephedrine (CLARITIN-D 24-HOUR)  mg per tablet Take 1 Tab by Mouth Once a Day. Yes Historical Provider   tiotropium (SPIRIVA) 18 mcg inhalation capsule Take 1 Cap by inhalation daily.  10/12/16  Yes Historical Provider   ASPIRIN PO Take 81 mg by mouth. Yes Historical Provider   montelukast (SINGULAIR) 10 mg tablet Take 10 mg by mouth daily. Yes Terry Monroy MD   fluticasone-salmeterol (ADVAIR DISKUS) 500-50 mcg/dose diskus inhaler Take 1 Puff by inhalation every twelve (12) hours. Yes Phys MD Eliana   nitroglycerin (NITROSTAT) 0.4 mg SL tablet 0.4 mg by SubLINGual route every five (5) minutes as needed for Chest Pain. Yes Phys MD Eliana   meloxicam (MOBIC) 15 mg tablet Take 1 Tab by mouth daily as needed for Pain (knees). 5/2/18   Claudia Hammond MD   DULoxetine (CYMBALTA) 30 mg capsule TAKE 1 CAPSULE BY MOUTH EVERY MORNING 7/25/17   Tony Howard MD   omega 3-dha-epa-fish oil (FISH OIL) 60- mg cap Take 500 mg by mouth daily. Historical Provider   pravastatin (PRAVACHOL) 20 mg tablet Take 20 mg by mouth nightly. Historical Provider         Visit Vitals    /78    Pulse 71    Ht 5' 9\" (1.753 m)    Wt 98.4 kg (217 lb)    BMI 32.05 kg/m2       Physical Exam   Constitutional: He is oriented to person, place, and time. He appears well-developed and well-nourished. HENT:   Head: Normocephalic and atraumatic. Eyes: Conjunctivae are normal.   Neck: Neck supple. No JVD present. No tracheal deviation present. No thyromegaly present. Cardiovascular: Normal rate, regular rhythm and normal heart sounds. Exam reveals no gallop and no friction rub. No murmur heard. Pulmonary/Chest: Breath sounds normal. No respiratory distress. He has no wheezes. He has no rales. He exhibits no tenderness. Abdominal: Soft. There is no tenderness. Musculoskeletal: He exhibits no edema. Neurological: He is alert and oriented to person, place, and time. Skin: Skin is warm and dry. Psychiatric: He has a normal mood and affect. I have personally reviewed patient's records available from hospital and other providers and incorporated findings in patient care.   2014  IMPRESSION  1. Angiographically normal coronary arteries  2. Normal left ventricular systolic function with an ejection   fractio of 65%  3. Normal left sided filling pressures  4. No evidence of Aortic stenosis or Mitral regurgitation  Stress test    TECHNIQUE:  Following the resting injection of 8.5 mCi of technetium-99m-sestamibi and at stress 0.4 mg of intravenous Lexiscan and 30.5 mCi of technetium-99m-sestamibi, resting and stress images of the left ventricular myocardium were obtained in the SPECT mode. FINDINGS:  Review of stress and rest nuclear SPECT images show no pathologic perfusion defects noted. This is therefore felt to represent a study with no diagnostic abnormalities and no definite evidence for infarct or ischemia. Review of gated images shows a normal left ventricular ejection fraction of 57%. There are no segmental wall motion abnormalities seen. 2014: Echocardiogram   LOW NORMAL LEFT VENTRICULAR SYSTOLIC FUNCTION; EJECTION FRACTION ESTIMATED AT 50%. 2. MILD DIASTOLIC DYSFUNCTION. 3. MILD CONCENTRIC LEFT VENTRICULAR HYPERTROPHY. 4. MILDLY DILATED RIGHT HEART WITH REDUCED SYSTOLIC FUNCTION. 5. NO HEMODYNAMICALLY SIGNIFICANT VALVULAR PATHOLOGY. 6. NORMAL PULMONARY ARTERY PRESSURE ESTIMATED AT 28 MMHG. 7. NO MASSES, SHUNTS OR THROMBI SEEN; NEGATIVE BUBBLE STUDY. I Have personally reviewed recent relevant labs available and discussed with patient  I have personally reviewed patients ekg done at other facility. Mr. Kayla Rojas has a reminder for a \"due or due soon\" health maintenance. I have asked that he contact his primary care provider for follow-up on this health maintenance. No flowsheet data found. Assessment         ICD-10-CM ICD-9-CM    1. Hypertensive heart disease without heart failure I11.9 402.90 ECHO ADULT COMPLETE      NUCLEAR CARDIAC STRESS TEST    Blood pressure control. LVH on EKG.    2. Hypertensive left ventricular hypertrophy, without heart failure I11.9 402.90 Mild LVH on last echo. 3. Abnormal EKG R94.31 794.31 ECHO ADULT COMPLETE      NUCLEAR CARDIAC STRESS TEST    Chronic ST-T changes noted likely from LVH. Prior cardiac workup includes stress test and cardiac catheterization. Reviewed   4. Pre-operative cardiovascular examination Z01.810 V72.81     We will set up echocardiogram exam and nuclear stress test to assess for ischemia and LV function prior to surgical intervention   5. Type 2 diabetes mellitus without complication, without long-term current use of insulin (HCC) E11.9 250.00     On treatment   6. Medication refill Z76.0 V68.1 metoprolol succinate (TOPROL-XL) 25 mg XL tablet   7. Hyperlipidemia, unspecified hyperlipidemia type E78.5 272.4     cramps with pravastatin. Consider alternate therapy after surgery     07/09/18  Current and old EKGs are reviewed. Old cardiac testing reviewed. Likely nonspecific hypertensive disease changes on EKG but with complaint of shortness of breath we will proceed with stress test and echocardiogram exam to rule out any evidence of ischemia and assess LV function. Medications Discontinued During This Encounter   Medication Reason    metoprolol succinate (TOPROL-XL) 25 mg XL tablet Reorder    lisinopril (PRINIVIL, ZESTRIL) 20 mg tablet Reorder       Orders Placed This Encounter    metoprolol succinate (TOPROL-XL) 25 mg XL tablet     Sig: Take 1 Tab by mouth daily. Dispense:  90 Tab     Refill:  1    lisinopril (PRINIVIL, ZESTRIL) 20 mg tablet     Sig: Take 1 Tab by mouth daily. Dispense:  90 Tab     Refill:  1       Follow-up Disposition:  Return for F/u after tests.

## 2018-07-09 NOTE — MR AVS SNAPSHOT
[address] [facility] [address] 
 
 
 [address] [facility], [address] [facility] [address] 
[phone] Patient: Lizbet Hardin MRN: [id number] Visit Information Date & Time Provider Department Dept. Phone Encounter #  
 7/9/2018  9:00 AM Jacobo Beard MD [facility] [address] [place] [address] [id number] Follow-up Instructions Return for F/u after tests. Your Appointments 7/11/2018 12:45 PM  
PHYSICAL with Tony George MD  
[facility] [address] [place] [address]) Appt Note: surgical clearance [address] [facility] [address]  
  
   
 [address] [facility]  
  
    
 7/12/2018  8:30 AM  
HISTORY AND PHYSICAL with Sofie Vasquez PA-C  
[facility] - [address]) Appt Note: MEDIAL MENISECTOMY RIGHT KNEE  
 [address] [facility], [address] [facility] [address]  
[phone]  
  
   
 [address], [address] [address] 7/13/2018  7:00 AM  
PROCEDURE with [place] NUC [facility] ([facility]) Appt Note: bautista clearance wt [facility], [address] [facility] [address]  
[phone]  
  
   
 [address] [facility], [address] [address]  
  
    
 8/2/2018  2:00 PM  
Office Visit with Jake Singleton MD  
[employer] ([facility]) Appt Note: 3 month f/u  
 [address]  
  
   
 [address] [facility]  
  
    
 8/17/2018 10:00 AM  
PROCEDURE with CA ECHO [facility] ([address] [place] [address]) Appt Note: bautista [address] [facility], [address] [facility] [address]  
[address], [address] [address]  
  
    
 8/22/2018 11:00 AM  
Office Visit with Jacobo Beard MD  
[facility]) Appt Note: post nuc/echo [address] [facility], [address] [facility] [address] [address], [address] [place] [address] Upcoming Health Maintenance Date Due  
 FOOT EXAM Q1 2/5/1979 EYE EXAM RETINAL OR DILATED Q1 5/3/2018 MEDICARE YEARLY EXAM 5/4/2018 Influenza Age 5 to Adult 8/1/2018 HEMOGLOBIN A1C Q6M 1/2/2019 MICROALBUMIN Q1 5/2/2019 LIPID PANEL Q1 5/2/2019 DTaP/Tdap/Td series (2 - Td) 12/12/2026 Allergies as of 7/9/2018  Review Complete On: 7/9/2018 By: Yolanda Bucio Severity Noted Reaction Type Reactions Iodine High 04/01/2014    Anaphylaxis Norco [Hydrocodone-acetaminophen] High 12/12/2016    Nausea and Vomiting Gi distress Shellfish Derived High 07/31/2014    Swelling, Angioedema Other reaction(s): anaphylaxis/angioedema [address] AND CRAB Current Immunizations  Never Reviewed Name Date Influenza Vaccine 10/27/2014, 9/1/2014 Pneumococcal Polysaccharide (PPSV-23) 11/4/2014 12:17 PM, 10/31/2013 Not reviewed this visit You Were Diagnosed With   
  
 Codes Comments Hypertensive heart disease without heart failure    -  Primary ICD-10-CM: I11.9 ICD-9-CM: 402.90 Blood pressure control. LVH on EKG. Hypertensive left ventricular hypertrophy, without heart failure     ICD-10-CM: I11.9 ICD-9-CM: 402.90 Mild LVH on last echo. Abnormal EKG     ICD-10-CM: R94.31 
ICD-9-CM: 794.31 Chronic ST-T changes noted likely from LVH. Prior cardiac workup includes stress test and cardiac catheterization. Reviewed Pre-operative cardiovascular examination     ICD-10-CM: Z01.810 ICD-9-CM: V72.81 We will set up echocardiogram exam and nuclear stress test to assess for ischemia and LV function prior to surgical intervention Type 2 diabetes mellitus without complication, without long-term current use of insulin (HCC)     ICD-10-CM: E11.9 ICD-9-CM: 250.00 On treatment Medication refill     ICD-10-CM: Z76.0 ICD-9-CM: V68.1 Hyperlipidemia, unspecified hyperlipidemia type     ICD-10-CM: E78.5 ICD-9-CM: 272.4 cramps with pravastatin. Consider alternate therapy after surgery Vitals BP Pulse Height(growth percentile) Weight(growth percentile) BMI Smoking Status 138/78 71 5' 9\" (1.753 m) 217 lb (98.4 kg) 32.05 kg/m2 Current Every Day Smoker Vitals History BMI and BSA Data Body Mass Index Body Surface Area 32.05 kg/m 2 2.19 m 2 Preferred Pharmacy Pharmacy Name Phone [facility] [phone] Your Updated Medication List  
  
   
This list is accurate as of 7/9/18  9:49 AM.  Always use your most recent med list.  
  
  
  
  
 ADVAIR DISKUS 500-50 mcg/dose diskus inhaler Generic drug:  fluticasone-salmeterol Take 1 Puff by inhalation every twelve (12) hours. albuterol 90 mcg/actuation inhaler Commonly known as:  PROVENTIL HFA, VENTOLIN HFA, PROAIR HFA  
1-2 Puffs. ASPIRIN PO Take 81 mg by mouth. cetirizine-psuedoePHEDrine 5-120 mg per tablet Commonly known as:  ZyrTEC-D Take 1 Tab by mouth two (2) times a day. cyclobenzaprine 10 mg tablet Commonly known as:  FLEXERIL Take one po qhs prn muscle spasms  
  
 docusate sodium 100 mg capsule Commonly known as:  Latrice Neighbor Take one po daily prn constipation DULoxetine 30 mg capsule Commonly known as:  CYMBALTA TAKE 1 CAPSULE BY MOUTH EVERY MORNING  
  
 FISH OIL 60- mg Cap Generic drug:  omega 3-dha-epa-fish oil Take 500 mg by mouth daily. fluticasone 50 mcg/actuation nasal spray Commonly known as:  Cathlyn Vikki 2 Sprays by Both Nostrils route daily. glipiZIDE 10 mg tablet Commonly known as:  Caralyn Fortis Take 1 Tab by mouth two (2) times a day. lidocaine 5 % ointment Commonly known as:  XYLOCAINE  
APPLY THIN FILM RUB EXTERNALLY TWICE DAILY IN THE MORNING AND EVENING  
  
 lisinopril 20 mg tablet Commonly known as:  Aundra Darnello Take 1 Tab by mouth daily. loratadine-pseudoephedrine  mg per tablet Commonly known as:  CLARITIN-D 24-hour Take 1 Tab by Mouth Once a Day. meloxicam 15 mg tablet Commonly known as:  MOBIC Take 1 Tab by mouth daily as needed for Pain (knees). metFORMIN ER 1,000 mg Tr24 TAKE 1 TABLET BY MOUTH 2 TIMES A DAY  
  
 metoprolol succinate 25 mg XL tablet Commonly known as:  TOPROL-XL Take 1 Tab by mouth daily. montelukast 10 mg tablet Commonly known as:  SINGULAIR Take 10 mg by mouth daily. NITROSTAT 0.4 mg SL tablet Generic drug:  nitroglycerin 0.4 mg by SubLINGual route every five (5) minutes as needed for Chest Pain. omeprazole 40 mg capsule Commonly known as:  PRILOSEC  
as needed PARoxetine 20 mg tablet Commonly known as:  PAXIL  
  
 pravastatin 20 mg tablet Commonly known as:  PRAVACHOL Take 20 mg by mouth nightly. QUEtiapine 100 mg tablet Commonly known as:  SEROquel  
  
 tiotropium 18 mcg inhalation capsule Commonly known as:  Steve Bunkers Take 1 Cap by inhalation daily. Prescriptions Sent to Pharmacy Refills  
 metoprolol succinate (TOPROL-XL) 25 mg XL tablet 1 Sig: Take 1 Tab by mouth daily. Class: Normal  
 Pharmacy: [facility] [address] [place] [address] Ph #: [phone] Route: Oral  
 lisinopril (PRINIVIL, ZESTRIL) 20 mg tablet 1 Sig: Take 1 Tab by mouth daily. Class: Normal  
 Pharmacy: [facility] [address] [place] [address] Ph #: [phone] Route: Oral  
  
Follow-up Instructions Return for F/u after tests. To-Do List   
 07/09/2018 Echocardiography:  ECHO ADULT COMPLETE   
  
 07/09/2018 NM Stress:  NUCLEAR CARDIAC STRESS TEST Introducing [facility] SERVICES! Dear Yolette Fails: Thank you for requesting a [employer] account.   Our records indicate that you already have an active [employer] account. You can access your account anytime at [email or web address] Did you know that you can access your hospital and ER discharge instructions at any time in [employer]? You can also review all of your test results from your hospital stay or ER visit. Additional Information If you have questions, please visit the Frequently Asked Questions section of the [employer] website at [email or web address] Remember, [employer] is NOT to be used for urgent needs. For medical emergencies, dial 911. Now available from your iPhone and Android! Please provide this summary of care documentation to your next provider. Your primary care clinician is listed as Tony Howard. If you have any questions after today's visit, please call [phone].

## 2018-07-11 ENCOUNTER — OFFICE VISIT (OUTPATIENT)
Dept: INTERNAL MEDICINE CLINIC | Age: 48
End: 2018-07-11

## 2018-07-11 VITALS
TEMPERATURE: 98.3 F | BODY MASS INDEX: 31.84 KG/M2 | WEIGHT: 215 LBS | HEIGHT: 69 IN | HEART RATE: 77 BPM | SYSTOLIC BLOOD PRESSURE: 119 MMHG | RESPIRATION RATE: 16 BRPM | OXYGEN SATURATION: 94 % | DIASTOLIC BLOOD PRESSURE: 73 MMHG

## 2018-07-11 DIAGNOSIS — I10 BENIGN HYPERTENSION WITHOUT CHF: ICD-10-CM

## 2018-07-11 DIAGNOSIS — J45.909 UNCOMPLICATED ASTHMA, UNSPECIFIED ASTHMA SEVERITY, UNSPECIFIED WHETHER PERSISTENT: ICD-10-CM

## 2018-07-11 DIAGNOSIS — Z01.818 PREOP EXAMINATION: Primary | ICD-10-CM

## 2018-07-11 DIAGNOSIS — F20.9 SCHIZOPHRENIA, UNSPECIFIED TYPE (HCC): ICD-10-CM

## 2018-07-11 RX ORDER — METFORMIN HYDROCHLORIDE 500 MG/1
1000 TABLET, EXTENDED RELEASE ORAL 2 TIMES DAILY
Qty: 360 TAB | Refills: 3 | Status: SHIPPED | OUTPATIENT
Start: 2018-07-11 | End: 2018-10-25 | Stop reason: SDUPTHER

## 2018-07-11 RX ORDER — METFORMIN HYDROCHLORIDE EXTENDED-RELEASE TABLETS 1000 MG/1
TABLET, FILM COATED, EXTENDED RELEASE ORAL
Qty: 180 TAB | Refills: 3 | Status: SHIPPED | OUTPATIENT
Start: 2018-07-11 | End: 2018-07-11 | Stop reason: CLARIF

## 2018-07-11 RX ORDER — LANCETS
EACH MISCELLANEOUS
Qty: 100 EACH | Refills: 11 | Status: SHIPPED | OUTPATIENT
Start: 2018-07-11 | End: 2019-09-27 | Stop reason: SDUPTHER

## 2018-07-11 RX ORDER — INSULIN PUMP SYRINGE, 3 ML
EACH MISCELLANEOUS
Qty: 1 KIT | Refills: 0 | Status: SHIPPED | OUTPATIENT
Start: 2018-07-11 | End: 2021-03-08

## 2018-07-11 NOTE — PROGRESS NOTES
Received fax from [facility] DR BRANDY WILKINSON that the metformin ER 1000mg is not covered by insurance. They cover metformin ER 500mg 2 tablets twice a day. Sent electronically:    Requested Prescriptions     Signed Prescriptions Disp Refills    metFORMIN ER (GLUCOPHAGE XR) 500 mg tablet 360 Tab 3     Sig: Take 2 Tabs by mouth two (2) times a day.

## 2018-07-11 NOTE — PATIENT INSTRUCTIONS
1) take co-q10 enzyme 100mg one po daily for the muscle cramping.    2) follow-up in 3 months or sooner if worsening symptoms.

## 2018-07-11 NOTE — MR AVS SNAPSHOT
[facility] 
 
 
 [address] [address] 
[phone] Patient: Agatha Pham MRN: [id number] STORM:8[birth year] Visit Information Date & Time Provider Department Dept. Phone Encounter #  
 7/11/2018 12:45 PM MD [address] [phone] [id number] Follow-up Instructions Return in about 3 months (around 10/11/2018) for f/u DM/HTN/lipids. Your Appointments 7/12/2018  8:30 AM  
HISTORY AND PHYSICAL with Kory Katz PA-C  
[facility] - [place] [address] [place] [address]) Appt Note: MEDIAL MENISECTOMY RIGHT KNEE  
 [address] [facility], [address] [facility] [address]  
[phone]  
  
   
 [address] [place] [address], [address] [place] [address] Road [address] 7/13/2018  7:00 AM  
PROCEDURE with [place] [facility] ([facility]) Appt Note: bautista clearance wt [facility], [address] [facility] [address] [phone]  
  
   
 [address] [facility], [place], [address] [place] [address] Road [address]  
  
    
 8/2/2018  2:00 PM  
Office Visit with MD Ranjeet [address] ([facility]) Appt Note: 3 month f/u; due for [address]  -krm; 3 month f/u; due for [address]  -krm [address]  
  
    
 8/17/2018 10:00 AM  
PROCEDURE with [place] [facility] ([facility]) Appt Note: bautista [address] [facility], [place], [address] [facility] [address]  
[address], [address] [address]  
  
    
 8/22/2018 11:00 AM  
Office Visit with Kiya Brown MD  
[facility]) Appt Note: post nuc/echo [address] [facility], [place], [address] [facility] [address]  
[address], [address] [address] Upcoming Health Maintenance Date Due  
 FOOT EXAM Q1 2/5/1979 EYE EXAM RETINAL OR DILATED Q1 5/3/2018 Influenza Age 5 to Adult 8/1/2018 HEMOGLOBIN A1C Q6M 1/2/2019 MICROALBUMIN Q1 5/2/2019 LIPID PANEL Q1 5/2/2019 DTaP/Tdap/Td series (2 - Td) 12/12/2026 Allergies as of 7/11/2018  Review Complete On: 7/11/2018 By: Nitin Jones MD  
  
 Severity Noted Reaction Type Reactions Iodine High 04/01/2014    Anaphylaxis Norco [Hydrocodone-acetaminophen] High 12/12/2016    Nausea and Vomiting Gi distress Shellfish Derived High 07/31/2014    Swelling, Angioedema Other reaction(s): anaphylaxis/angioedema [address] AND CRAB Current Immunizations  Never Reviewed Name Date Influenza Vaccine 10/27/2014, 9/1/2014 Pneumococcal Polysaccharide (PPSV-23) 11/4/2014 12:17 PM, 10/31/2013 Not reviewed this visit You Were Diagnosed With   
  
 Codes Comments Uncontrolled type 2 diabetes mellitus without complication, without long-term current use of insulin ([facility]    -  Primary ICD-10-CM: E11.65 ICD-9-CM: 250.02 Vitals BP Pulse Temp Resp Height(growth percentile) Weight(growth percentile) 119/73 (BP 1 Location: Right arm, BP Patient Position: Sitting) 77 98.3 °F (36.8 °C) (Oral) 16 5' 9\" (1.753 m) 215 lb (97.5 kg) SpO2 BMI Smoking Status 94% 31.75 kg/m2 Current Every Day Smoker Vitals History BMI and BSA Data Body Mass Index Body Surface Area 31.75 kg/m 2 2.18 m 2 Preferred Pharmacy Pharmacy Name Phone [facility] [phone] Your Updated Medication List  
  
   
This list is accurate as of 7/11/18 12:54 PM.  Always use your most recent med list.  
  
  
  
  
 ADVAIR DISKUS 500-50 mcg/dose diskus inhaler Generic drug:  fluticasone-salmeterol Take 1 Puff by inhalation every twelve (12) hours. albuterol 90 mcg/actuation inhaler Commonly known as:  PROVENTIL HFA, VENTOLIN HFA, PROAIR HFA  
1-2 Puffs. ASPIRIN PO Take 81 mg by mouth. Blood-Glucose Meter monitoring kit Check fasting sugars daily before breakfast  
  
 cetirizine-psuedoePHEDrine 5-120 mg per tablet Commonly known as:  ZyrTEC-D Take 1 Tab by mouth two (2) times a day. cyclobenzaprine 10 mg tablet Commonly known as:  FLEXERIL Take one po qhs prn muscle spasms  
  
 docusate sodium 100 mg capsule Commonly known as:  Latrice Neighbor Take one po daily prn constipation DULoxetine 30 mg capsule Commonly known as:  CYMBALTA TAKE 1 CAPSULE BY MOUTH EVERY MORNING  
  
 FISH OIL 60- mg Cap Generic drug:  omega 3-dha-epa-fish oil Take 500 mg by mouth daily. fluticasone 50 mcg/actuation nasal spray Commonly known as:  Cathlyn Vikki 2 Sprays by Both Nostrils route daily. glipiZIDE 10 mg tablet Commonly known as:  Caralyn Fortis Take 1 Tab by mouth two (2) times a day. glucose blood VI test strips strip Commonly known as:  ASCENSIA AUTODISC VI, ONE TOUCH ULTRA TEST VI Check fasting sugars daily before breakfast  
  
 Lancets Misc Check fasting sugars daily before breakfast  
  
 lidocaine 5 % ointment Commonly known as:  XYLOCAINE  
APPLY THIN FILM RUB EXTERNALLY TWICE DAILY IN THE MORNING AND EVENING  
  
 lisinopril 20 mg tablet Commonly known as:  Lois Needy Take 1 Tab by mouth daily. loratadine-pseudoephedrine  mg per tablet Commonly known as:  CLARITIN-D 24-hour Take 1 Tab by Mouth Once a Day. metFORMIN ER 1,000 mg Tr24 TAKE 1 TABLET BY MOUTH 2 TIMES A DAY  
  
 metoprolol succinate 25 mg XL tablet Commonly known as:  TOPROL-XL Take 1 Tab by mouth daily. montelukast 10 mg tablet Commonly known as:  SINGULAIR Take 10 mg by mouth daily. NITROSTAT 0.4 mg SL tablet Generic drug:  nitroglycerin 0.4 mg by SubLINGual route every five (5) minutes as needed for Chest Pain.  
  
 omeprazole 40 mg capsule Commonly known as:  PRILOSEC  
as needed PARoxetine 20 mg tablet Commonly known as:  PAXIL Take 20 mg by mouth daily. pravastatin 20 mg tablet Commonly known as:  PRAVACHOL Take 20 mg by mouth nightly. QUEtiapine 100 mg tablet Commonly known as:  SEROquel Take 100 mg by mouth nightly. tiotropium 18 mcg inhalation capsule Commonly known as:  Sara Barlow Take 1 Cap by inhalation daily. Prescriptions Sent to Pharmacy Refills  
 glucose blood VI test strips (ASCENSIA AUTODISC VI, ONE TOUCH ULTRA TEST VI) strip 11 Sig: Check fasting sugars daily before breakfast  
 Class: Normal  
 Pharmacy: [address] Ph #: [phone] Lancets misc 11 Sig: Check fasting sugars daily before breakfast  
 Class: Normal  
 Pharmacy: [address] Ph #: [phone] Blood-Glucose Meter monitoring kit 0 Sig: Check fasting sugars daily before breakfast  
 Class: Normal  
 Pharmacy: [address] Ph #: [phone]  
 metFORMIN ER 1,000 mg tr24 3 Sig: TAKE 1 TABLET BY MOUTH 2 TIMES A DAY Class: Normal  
 Pharmacy: [address] Ph #: [phone] Follow-up Instructions Return in about 3 months (around 10/11/2018) for f/u DM/HTN/lipids. Patient Instructions 1) take co-q10 enzyme 100mg one po daily for the muscle cramping. 
 
2) follow-up in 3 months or sooner if worsening symptoms. Introducing [facility]! Dear Luis Prado: Thank you for requesting a Neura account. Our records indicate that you already have an active Neura account. You can access your account anytime at [email or web address] Did you know that you can access your hospital and ER discharge instructions at any time in Funzio? You can also review all of your test results from your hospital stay or ER visit. Additional Information If you have questions, please visit the Frequently Asked Questions section of the Funzio website at [email or web address] Remember, Funzio is NOT to be used for urgent needs. For medical emergencies, dial 911. Now available from your iPhone and Android! Please provide this summary of care documentation to your next provider. Your primary care clinician is listed as Tony Howard. If you have any questions after today's visit, please call [phone].

## 2018-07-11 NOTE — PROGRESS NOTES
Chief Complaint   Patient presents with    Pre-op Exam         HPI:     Nieves Cuellar is a 51 y.o.  male with history of type 2 DM and hypertension and asthma here for the above complaint. He is suppose to have on 7/16/18 right knee arthroscopic partial medial menisectomy by Dr. Jeremy Rivera. He denies any chest pain, headaches, dizziness, abdominal pain. He has some shortness of breath due to the humidity. He has no problems with anesthesia. He has history of ISAI on CPAP machine. He can walk 1.5 blocks without getting short of breath or chest pain. He can only go up 1 flight of stairs without chest pain and shortness of breath. He has history of DVT in LLE in the past and has taken coumadin. He has seen his cardiologist Dr. Pat Gu on 7/9/18 and they ordered stress test due to abnormal EKG. He has preop testing at [facility] on 7/2/18 with labs, EKG, and CXR. Type 2 DM: he has not been checking his sugars. He said he does not have a meter. Past Medical History:   Diagnosis Date    Abnormal EKG 7/9/2018    Chronic ST-T changes noted likely from LVH. Prior cardiac workup includes stress test and cardiac catheterization. Reviewed    Advance directive discussed with patient 05/03/2017    Arthritis     left knee    Asthma     CAD (coronary artery disease)     heart attack 6/2012, Being followed by Dr. Ester Hampton Cocaine use 2017    Depression     Diabetes ([facility] [address]     DVT (deep venous thrombosis) (HCC)     left leg in the past (Pt was on coumadin)    GERD (gastroesophageal reflux disease)     Hyperlipidemia 7/9/2018    cramps with pravastatin. Consider alternate therapy after surgery    Hypertension     Hypertensive heart disease without heart failure 7/9/2018    Blood pressure control. LVH on EKG.  Hypertensive left ventricular hypertrophy, without heart failure 7/9/2018    Mild LVH on last echo.     Medication refill 7/9/2018    Pre-operative cardiovascular examination 7/9/2018    Knee surgery. Stable cardiac status. Okay to proceed with moderate cardiac risk    Psychiatric disorder     depression and schziophenia    Schizophrenia ([facility] [address]     Will be seeing [facility]    Type 2 diabetes mellitus without complication, without long-term current use of insulin ([facility] [address] 7/9/2018    On treatment    Unspecified sleep apnea     cpap machine,Will be seeing neurologist       Past Surgical History:   Procedure Laterality Date    ABDOMEN SURGERY [facility] [address] HX COLONOSCOPY      HX HEART CATHETERIZATION  2014    cardiac cath - normal coronaries    HX HERNIA REPAIR      HX KNEE REPLACEMENT      left TKR    HX ORTHOPAEDIC      L knee 1987, 1996    HX OTHER SURGICAL      eye sx at 2 yrs old           MEDICATION ALLERGIES/INTOLERANCES:   Allergies   Allergen Reactions    Iodine Anaphylaxis    Norco [Hydrocodone-Acetaminophen] Nausea and Vomiting     Gi distress    Shellfish Derived Swelling and Angioedema     Other reaction(s): anaphylaxis/angioedema  [employer]IMP [employer] CR[employer]             CURRENT MEDICATIONS:    Current Outpatient Prescriptions   Medication Sig    glucose blood VI test strips (ASCENSIA AUTODISC VI, ONE TOUCH ULTRA TEST VI) strip Check fasting sugars daily before breakfast    Lancets misc Check fasting sugars daily before breakfast    Blood-Glucose Meter monitoring kit Check fasting sugars daily before breakfast    metFORMIN ER 1,000 mg tr24 TAKE 1 TABLET BY MOUTH 2 TIMES A DAY    metoprolol succinate (TOPROL-XL) 25 mg XL tablet Take 1 Tab by mouth daily.  lisinopril (PRINIVIL, ZESTRIL) 20 mg tablet Take 1 Tab by mouth daily.  fluticasone (FLONASE) 50 mcg/actuation nasal spray 2 Sprays by Both Nostrils route daily.  glipiZIDE (GLUCOTROL) 10 mg tablet Take 1 Tab by mouth two (2) times a day.  PARoxetine (PAXIL) 20 mg tablet Take 20 mg by mouth daily.  QUEtiapine (SEROQUEL) 100 mg tablet Take 100 mg by mouth nightly.     cyclobenzaprine (FLEXERIL) 10 mg tablet Take one po qhs prn muscle spasms    cetirizine-psuedoePHEDrine (ZYRTEC-D) 5-120 mg per tablet Take 1 Tab by mouth two (2) times a day.  DULoxetine (CYMBALTA) 30 mg capsule TAKE 1 CAPSULE BY MOUTH EVERY MORNING    docusate sodium (COLACE) 100 mg capsule Take one po daily prn constipation    lidocaine (XYLOCAINE) 5 % ointment APPLY THIN FILM RUB EXTERNALLY TWICE DAILY IN THE MORNING AND EVENING    omeprazole (PRILOSEC) 40 mg capsule as needed    albuterol (PROVENTIL HFA, VENTOLIN HFA, PROAIR HFA) 90 mcg/actuation inhaler 1-2 Puffs.  loratadine-pseudoephedrine (CLARITIN-D 24-HOUR)  mg per tablet Take 1 Tab by Mouth Once a Day.  omega 3-dha-epa-fish oil (FISH OIL) 60- mg cap Take 500 mg by mouth daily.  pravastatin (PRAVACHOL) 20 mg tablet Take 20 mg by mouth nightly.  tiotropium (SPIRIVA) 18 mcg inhalation capsule Take 1 Cap by inhalation daily.  ASPIRIN PO Take 81 mg by mouth.  montelukast (SINGULAIR) 10 mg tablet Take 10 mg by mouth daily.  fluticasone-salmeterol (ADVAIR DISKUS) 500-50 mcg/dose diskus inhaler Take 1 Puff by inhalation every twelve (12) hours.  nitroglycerin (NITROSTAT) 0.4 mg SL tablet 0.4 mg by SubLINGual route every five (5) minutes as needed for Chest Pain. No current facility-administered medications for this visit. Health Maintenance   Topic Date Due    FOOT EXAM Q1  02/05/1979    EYE EXAM RETINAL OR DILATED Q1  05/03/2018    Influenza Age 5 to Adult  08/01/2018    HEMOGLOBIN A1C Q6M  01/02/2019    MICROALBUMIN Q1  05/02/2019    LIPID PANEL Q1  05/02/2019    DTaP/Tdap/Td series (2 - Td) 12/12/2026    Pneumococcal 19-64 Medium Risk  Completed         FAMILY HISTORY:   Family History   Problem Relation Age of Onset    Anemia Mother     Cancer Father      prostate    Cancer Brother     Diabetes Brother        SOCIAL HISTORY:   He  reports that he has been smoking Cigars.   He has been smoking about 0.25 packs per day. He has never used smokeless tobacco.  He  reports that he drinks alcohol. OBJECTIVE:  PHYSICAL EXAM: Vitals:   Vitals:    07/11/18 1231   BP: 119/73   Pulse: 77   Resp: 16   Temp: 98.3 °F (36.8 °C)   TempSrc: Oral   SpO2: 94%   Weight: 215 lb (97.5 kg)   Height: 5' 9\" (1.753 m)     Exam:   Generally: Pleasant male in no acute distress    Cardiac exam: regular, rate, and rhythm. No murmurs, gallops, or rubs. Normal S1 and S2.    Pulmonary exam: Clear to ausculation bilaterally    Abdominal exam: Positive bowel sounds in all four quadrants. Soft. Nondistended. Nontender. No hepatosplenomegaly. Extremities: 2+ dorsalis pedis bilaterally. No pedal edema bilaterally. LABS/RADIOLOGICAL TESTS:     Component      Latest Ref Rng & Units 7/2/2018           8:08 AM   WBC      4.6 - 13.2 K/uL    RBC      4.70 - 5.50 M/uL    HGB      13.0 - 16.0 g/dL    HCT      36.0 - 48.0 %    MCV      74.0 - 97.0 FL    MCH      24.0 - 34.0 PG    MCHC      31.0 - 37.0 g/dL    RDW      11.6 - 14.5 %    PLATELET      441 - 174 K/uL    MPV      9.2 - 11.8 FL    NEUTROPHILS      40 - 73 %    LYMPHOCYTES      21 - 52 %    MONOCYTES      3 - 10 %    EOSINOPHILS      0 - 5 %    BASOPHILS      0 - 2 %    ABS. NEUTROPHILS      1.8 - 8.0 K/UL    ABS. LYMPHOCYTES      0.9 - 3.6 K/UL    ABS. MONOCYTES      0.05 - 1.2 K/UL    ABS. EOSINOPHILS      0.0 - 0.4 K/UL    ABS. BASOPHILS      0.0 - 0.06 K/UL    DF          Sodium      136 - 145 mmol/L    Potassium      3.5 - 5.5 mmol/L    Chloride      100 - 108 mmol/L    CO2      21 - 32 mmol/L    Anion gap      3.0 - 18 mmol/L    Glucose      74 - 99 mg/dL    BUN      7.0 - 18 MG/DL    Creatinine      0.6 - 1.3 MG/DL    BUN/Creatinine ratio      12 - 20      GFR est AA      >60 ml/min/1.73m2    GFR est non-AA      >60 ml/min/1.73m2    Calcium      8.5 - 10.1 MG/DL    Bilirubin, total      0.2 - 1.0 MG/DL    ALT (SGPT)      16 - 61 U/L    AST      15 - 37 U/L    Alk. phosphatase      45 - 117 U/L    Protein, total      6.4 - 8.2 g/dL    Albumin      3.4 - 5.0 g/dL    Globulin      2.0 - 4.0 g/dL    A-G Ratio      0.8 - 1.7      Color          Appearance          Specific gravity      1.005 - 1.030      pH (UA)      5.0 - 8.0      Protein      NEG mg/dL    Glucose      NEG mg/dL    Ketone      NEG mg/dL    Bilirubin      NEG      Blood      NEG      Urobilinogen      0.2 - 1.0 EU/dL    Nitrites      NEG      Leukocyte Esterase      NEG      BENZODIAZEPINES      NEG      BARBITURATES      NEG      THC (TH-CANNABINOL)      NEG      OPIATES      NEG      PCP(PHENCYCLIDINE)      NEG      COCAINE      NEG      AMPHETAMINES      NEG      METHADONE      NEG      HDSCOM          Cholesterol, total      <200 MG/DL    Triglyceride      <150 MG/DL    HDL Cholesterol      40 - 60 MG/DL    LDL, calculated      0 - 100 MG/DL    VLDL, calculated      MG/DL    CHOL/HDL Ratio      0 - 5.0      Microalbumin,urine random      0 - 3.0 MG/DL    Creatinine, urine      30 - 125 mg/dL    Microalbumin/Creat.  Ratio      0 - 30 mg/g    Crossmatch Expiration          ABO/Rh(D)          Antibody screen          Prothrombin time      11.5 - 15.2 sec    INR      0.8 - 1.2      Hemoglobin A1c, (calculated)      4.2 - 5.6 %    Est. average glucose      mg/dL    Special Requests:       NO SPECIAL REQUESTS   Culture result:       [id number] COLONIES/mL CITROBACTER KOSERI (A)   TSH      0.36 - 3.74 uIU/mL    aPTT      23.0 - 36.4 SEC      Component      Latest Ref Rng & Units 7/2/2018 7/2/2018 7/2/2018 7/2/2018           8:08 AM  8:08 AM  8:08 AM  8:08 AM   WBC      4.6 - 13.2 K/uL       RBC      4.70 - 5.50 M/uL       HGB      13.0 - 16.0 g/dL       HCT      36.0 - 48.0 %       MCV      74.0 - 97.0 FL       MCH      24.0 - 34.0 PG       MCHC      31.0 - 37.0 g/dL       RDW      11.6 - 14.5 %       PLATELET      300 - 302 K/uL       MPV      9.2 - 11.8 FL       NEUTROPHILS      40 - 73 %       LYMPHOCYTES      21 - 52 % MONOCYTES      3 - 10 %       EOSINOPHILS      0 - 5 %       BASOPHILS      0 - 2 %       ABS. NEUTROPHILS      1.8 - 8.0 K/UL       ABS. LYMPHOCYTES      0.9 - 3.6 K/UL       ABS. MONOCYTES      0.05 - 1.2 K/UL       ABS. EOSINOPHILS      0.0 - 0.4 K/UL       ABS. BASOPHILS      0.0 - 0.06 K/UL       DF             Sodium      136 - 145 mmol/L   146 (H)    Potassium      3.5 - 5.5 mmol/L   3.8    Chloride      100 - 108 mmol/L   109 (H)    CO2      21 - 32 mmol/L   32    Anion gap      3.0 - 18 mmol/L   5    Glucose      74 - 99 mg/dL   111 (H)    BUN      7.0 - 18 MG/DL   10    Creatinine      0.6 - 1.3 MG/DL   1.02    BUN/Creatinine ratio      12 - 20     10 (L)    GFR est AA      >60 ml/min/1.73m2   >60    GFR est non-AA      >60 ml/min/1.73m2   >60    Calcium      8.5 - 10.1 MG/DL   8.8    Bilirubin, total      0.2 - 1.0 MG/DL       ALT (SGPT)      16 - 61 U/L       AST      15 - 37 U/L       Alk.  phosphatase      45 - 117 U/L       Protein, total      6.4 - 8.2 g/dL       Albumin      3.4 - 5.0 g/dL  3.4     Globulin      2.0 - 4.0 g/dL       A-G Ratio      0.8 - 1.7         Color             Appearance             Specific gravity      1.005 - 1.030         pH (UA)      5.0 - 8.0         Protein      NEG mg/dL       Glucose      NEG mg/dL       Ketone      NEG mg/dL       Bilirubin      NEG         Blood      NEG         Urobilinogen      0.2 - 1.0 EU/dL       Nitrites      NEG         Leukocyte Esterase      NEG         BENZODIAZEPINES      NEG         BARBITURATES      NEG         THC (TH-CANNABINOL)      NEG         OPIATES      NEG         PCP(PHENCYCLIDINE)      NEG         COCAINE      NEG         AMPHETAMINES      NEG         METHADONE      NEG         HDSCOM             Cholesterol, total      <200 MG/DL       Triglyceride      <150 MG/DL       HDL Cholesterol      40 - 60 MG/DL       LDL, calculated      0 - 100 MG/DL       VLDL, calculated      MG/DL       CHOL/HDL Ratio      0 - 5.0 Microalbumin,urine random      0 - 3.0 MG/DL       Creatinine, urine      30 - 125 mg/dL       Microalbumin/Creat. Ratio      0 - 30 mg/g       Crossmatch Expiration       07/16/2018      ABO/Rh(D)       A POSITIVE      Antibody screen       NEG      Prothrombin time      11.5 - 15.2 sec       INR      0.8 - 1.2         Hemoglobin A1c, (calculated)      4.2 - 5.6 %    7.6 (H)   Est. average glucose      mg/dL    171   Special Requests:             Culture result:             TSH      0.36 - 3.74 uIU/mL       aPTT      23.0 - 36.4 SEC         Component      Latest Ref Rng & Units 7/2/2018 7/2/2018 7/2/2018           8:08 AM  8:08 AM  8:08 AM   WBC      4.6 - 13.2 K/uL  5.1    RBC      4.70 - 5.50 M/uL  4.81    HGB      13.0 - 16.0 g/dL  13.7    HCT      36.0 - 48.0 %  42.3    MCV      74.0 - 97.0 FL  87.9    MCH      24.0 - 34.0 PG  28.5    MCHC      31.0 - 37.0 g/dL  32.4    RDW      11.6 - 14.5 %  13.6    PLATELET      677 - 146 K/uL  249    MPV      9.2 - 11.8 FL  9.5    NEUTROPHILS      40 - 73 %  53    LYMPHOCYTES      21 - 52 %  35    MONOCYTES      3 - 10 %  8    EOSINOPHILS      0 - 5 %  3    BASOPHILS      0 - 2 %  1    ABS. NEUTROPHILS      1.8 - 8.0 K/UL  2.7    ABS. LYMPHOCYTES      0.9 - 3.6 K/UL  1.8    ABS. MONOCYTES      0.05 - 1.2 K/UL  0.4    ABS. EOSINOPHILS      0.0 - 0.4 K/UL  0.2    ABS. BASOPHILS      0.0 - 0.06 K/UL  0.1 (H)    DF        AUTOMATED    Sodium      136 - 145 mmol/L      Potassium      3.5 - 5.5 mmol/L      Chloride      100 - 108 mmol/L      CO2      21 - 32 mmol/L      Anion gap      3.0 - 18 mmol/L      Glucose      74 - 99 mg/dL      BUN      7.0 - 18 MG/DL      Creatinine      0.6 - 1.3 MG/DL      BUN/Creatinine ratio      12 - 20        GFR est AA      >60 ml/min/1.73m2      GFR est non-AA      >60 ml/min/1.73m2      Calcium      8.5 - 10.1 MG/DL      Bilirubin, total      0.2 - 1.0 MG/DL      ALT (SGPT)      16 - 61 U/L      AST      15 - 37 U/L      Alk.  phosphatase 45 - 117 U/L      Protein, total      6.4 - 8.2 g/dL      Albumin      3.4 - 5.0 g/dL      Globulin      2.0 - 4.0 g/dL      A-G Ratio      0.8 - 1.7        Color         YELLOW   Appearance         CLEAR   Specific gravity      1.005 - 1.030     1.021   pH (UA)      5.0 - 8.0     5.5   Protein      NEG mg/dL   NEGATIVE   Glucose      NEG mg/dL   NEGATIVE   Ketone      NEG mg/dL   NEGATIVE   Bilirubin      NEG     NEGATIVE   Blood      NEG     NEGATIVE   Urobilinogen      0.2 - 1.0 EU/dL   1.0   Nitrites      NEG     NEGATIVE   Leukocyte Esterase      NEG     NEGATIVE   BENZODIAZEPINES      NEG   NEGATIVE     BARBITURATES      NEG   NEGATIVE     THC (TH-CANNABINOL)      NEG   POSITIVE (A)     OPIATES      NEG   NEGATIVE     PCP(PHENCYCLIDINE)      NEG   NEGATIVE     COCAINE      NEG   NEGATIVE     AMPHETAMINES      NEG   NEGATIVE     METHADONE      NEG   NEGATIVE     HDSCOM       (NOTE)     Cholesterol, total      <200 MG/DL      Triglyceride      <150 MG/DL      HDL Cholesterol      40 - 60 MG/DL      LDL, calculated      0 - 100 MG/DL      VLDL, calculated      MG/DL      CHOL/HDL Ratio      0 - 5.0        Microalbumin,urine random      0 - 3.0 MG/DL      Creatinine, urine      30 - 125 mg/dL      Microalbumin/Creat.  Ratio      0 - 30 mg/g      Crossmatch Expiration            ABO/Rh(D)            Antibody screen            Prothrombin time      11.5 - 15.2 sec      INR      0.8 - 1.2        Hemoglobin A1c, (calculated)      4.2 - 5.6 %      Est. average glucose      mg/dL      Special Requests:            Culture result:            TSH      0.36 - 3.74 uIU/mL      aPTT      23.0 - 36.4 SEC        Component      Latest Ref Rng & Units 7/2/2018 7/2/2018 5/2/2018 5/2/2018           8:08 AM  8:08 AM  4:23 PM  4:23 PM   WBC      4.6 - 13.2 K/uL       RBC      4.70 - 5.50 M/uL       HGB      13.0 - 16.0 g/dL       HCT      36.0 - 48.0 %       MCV      74.0 - 97.0 FL       MCH      24.0 - 34.0 PG       MCHC      31.0 - 37.0 g/dL       RDW      11.6 - 14.5 %       PLATELET      663 - 513 K/uL       MPV      9.2 - 11.8 FL       NEUTROPHILS      40 - 73 %       LYMPHOCYTES      21 - 52 %       MONOCYTES      3 - 10 %       EOSINOPHILS      0 - 5 %       BASOPHILS      0 - 2 %       ABS. NEUTROPHILS      1.8 - 8.0 K/UL       ABS. LYMPHOCYTES      0.9 - 3.6 K/UL       ABS. MONOCYTES      0.05 - 1.2 K/UL       ABS. EOSINOPHILS      0.0 - 0.4 K/UL       ABS. BASOPHILS      0.0 - 0.06 K/UL       DF             Sodium      136 - 145 mmol/L       Potassium      3.5 - 5.5 mmol/L       Chloride      100 - 108 mmol/L       CO2      21 - 32 mmol/L       Anion gap      3.0 - 18 mmol/L       Glucose      74 - 99 mg/dL       BUN      7.0 - 18 MG/DL       Creatinine      0.6 - 1.3 MG/DL       BUN/Creatinine ratio      12 - 20         GFR est AA      >60 ml/min/1.73m2       GFR est non-AA      >60 ml/min/1.73m2       Calcium      8.5 - 10.1 MG/DL       Bilirubin, total      0.2 - 1.0 MG/DL       ALT (SGPT)      16 - 61 U/L       AST      15 - 37 U/L       Alk.  phosphatase      45 - 117 U/L       Protein, total      6.4 - 8.2 g/dL       Albumin      3.4 - 5.0 g/dL       Globulin      2.0 - 4.0 g/dL       A-G Ratio      0.8 - 1.7         Color             Appearance             Specific gravity      1.005 - 1.030         pH (UA)      5.0 - 8.0         Protein      NEG mg/dL       Glucose      NEG mg/dL       Ketone      NEG mg/dL       Bilirubin      NEG         Blood      NEG         Urobilinogen      0.2 - 1.0 EU/dL       Nitrites      NEG         Leukocyte Esterase      NEG         BENZODIAZEPINES      NEG         BARBITURATES      NEG         THC (TH-CANNABINOL)      NEG         OPIATES      NEG         PCP(PHENCYCLIDINE)      NEG         COCAINE      NEG         AMPHETAMINES      NEG         METHADONE      NEG         HDSCOM             Cholesterol, total      <200 MG/DL       Triglyceride      <150 MG/DL       HDL Cholesterol      40 - 60 MG/DL LDL, calculated      0 - 100 MG/DL       VLDL, calculated      MG/DL       CHOL/HDL Ratio      0 - 5.0         Microalbumin,urine random      0 - 3.0 MG/DL   4.60 (H)    Creatinine, urine      30 - 125 mg/dL   484.00 (H)    Microalbumin/Creat. Ratio      0 - 30 mg/g   10    Crossmatch Expiration             ABO/Rh(D)             Antibody screen             Prothrombin time      11.5 - 15.2 sec  12.3     INR      0.8 - 1.2    1.0     Hemoglobin A1c, (calculated)      4.2 - 5.6 %    9.0 (H)   Est. average glucose      mg/dL       Special Requests:             Culture result:             TSH      0.36 - 3.74 uIU/mL       aPTT      23.0 - 36.4 SEC 27.3        Component      Latest Ref Rng & Units 5/2/2018 5/2/2018 5/2/2018           4:23 PM  4:23 PM  4:23 PM   WBC      4.6 - 13.2 K/uL      RBC      4.70 - 5.50 M/uL      HGB      13.0 - 16.0 g/dL      HCT      36.0 - 48.0 %      MCV      74.0 - 97.0 FL      MCH      24.0 - 34.0 PG      MCHC      31.0 - 37.0 g/dL      RDW      11.6 - 14.5 %      PLATELET      896 - 948 K/uL      MPV      9.2 - 11.8 FL      NEUTROPHILS      40 - 73 %      LYMPHOCYTES      21 - 52 %      MONOCYTES      3 - 10 %      EOSINOPHILS      0 - 5 %      BASOPHILS      0 - 2 %      ABS. NEUTROPHILS      1.8 - 8.0 K/UL      ABS. LYMPHOCYTES      0.9 - 3.6 K/UL      ABS. MONOCYTES      0.05 - 1.2 K/UL      ABS. EOSINOPHILS      0.0 - 0.4 K/UL      ABS.  BASOPHILS      0.0 - 0.06 K/UL      DF            Sodium      136 - 145 mmol/L  140    Potassium      3.5 - 5.5 mmol/L  4.2    Chloride      100 - 108 mmol/L  101    CO2      21 - 32 mmol/L  31    Anion gap      3.0 - 18 mmol/L  8    Glucose      74 - 99 mg/dL  264 (H)    BUN      7.0 - 18 MG/DL  10    Creatinine      0.6 - 1.3 MG/DL  1.41 (H)    BUN/Creatinine ratio      12 - 20    7 (L)    GFR est AA      >60 ml/min/1.73m2  >60    GFR est non-AA      >60 ml/min/1.73m2  53 (L)    Calcium      8.5 - 10.1 MG/DL  9.1    Bilirubin, total      0.2 - 1.0 MG/DL  0.2    ALT (SGPT)      16 - 61 U/L  93 (H)    AST      15 - 37 U/L  38 (H)    Alk. phosphatase      45 - 117 U/L  91    Protein, total      6.4 - 8.2 g/dL  6.6    Albumin      3.4 - 5.0 g/dL  3.8    Globulin      2.0 - 4.0 g/dL  2.8    A-G Ratio      0.8 - 1.7    1.4    Color            Appearance            Specific gravity      1.005 - 1.030        pH (UA)      5.0 - 8.0        Protein      NEG mg/dL      Glucose      NEG mg/dL      Ketone      NEG mg/dL      Bilirubin      NEG        Blood      NEG        Urobilinogen      0.2 - 1.0 EU/dL      Nitrites      NEG        Leukocyte Esterase      NEG        BENZODIAZEPINES      NEG        BARBITURATES      NEG        THC (TH-CANNABINOL)      NEG        OPIATES      NEG        PCP(PHENCYCLIDINE)      NEG        COCAINE      NEG        AMPHETAMINES      NEG        METHADONE      NEG        HDSCOM            Cholesterol, total      <200 MG/DL   194   Triglyceride      <150 MG/DL   302 (H)   HDL Cholesterol      40 - 60 MG/DL   39 (L)   LDL, calculated      0 - 100 MG/DL   94.6   VLDL, calculated      MG/DL   60.4   CHOL/HDL Ratio      0 - 5.0     5.0   Microalbumin,urine random      0 - 3.0 MG/DL      Creatinine, urine      30 - 125 mg/dL      Microalbumin/Creat. Ratio      0 - 30 mg/g      Crossmatch Expiration            ABO/Rh(D)            Antibody screen            Prothrombin time      11.5 - 15.2 sec      INR      0.8 - 1.2        Hemoglobin A1c, (calculated)      4.2 - 5.6 %      Est. average glucose      mg/dL      Special Requests:            Culture result:            TSH      0.36 - 3.74 uIU/mL 1.88     aPTT      23.0 - 36.4 SEC          Final result (Exam End: 7/2/2018  8:41 AM) Reviewed    Study Result   PROCEDURE:  Chest Frontal and Lateral.     CPT code 35219.      INDICATION:  Preoperative evaluation.      COMPARISON:  2/11/17.      FINDINGS:  Frontal and lateral views of the chest demonstrate clear lungs  bilaterally.   No lung nodules are detected. No pleural effusion is identified. Mediastinum and hilar regions are unremarkable. Cardiac silhouette is within  normal limits.       IMPRESSION  IMPRESSION:     No acute findings. 7/2/2018  3:28 PM - Sh[place], Card Result In   Component Results   Component Value Ref Range & Units Status   Ventricular Rate 67 BPM Final   Atrial Rate 67 BPM Final   P-R Interval 138 ms Final   QRS Duration 90 ms Final   Q-T Interval 412 ms Final   QTC Calculation (Bezet) 435 ms Final   Calculated P Axis 28 degrees Final   Calculated R Axis -22 degrees Final   Calculated T Axis -37 degrees Final   Diagnosis   Final   Normal sinus rhythm   Moderate voltage criteria for LVH, may be normal variant   T wave abnormality, consider inferolateral ischemia   Abnormal ECG   When compared with ECG of 11-FEB-2017 07:57,   T wave inversion now evident in Anterior leads   QT has shortened   Confirmed by Evelyne Bowman MD, --- ([id number]) on 7/2/2018 3:28:03 PM     All lab results and radiological studies/EKG  were reviewed and discussed with the patient. ASSESSMENT/PLAN:      ICD-10-CM ICD-9-CM    1. Preop examination Z01.818 V72.84 Pt at moderate risk for moderate procedure. 2. Uncontrolled type 2 diabetes mellitus without complication, without long-term current use of insulin (HCC) E11.65 250.02 Not well controlled, but improving. Continue diet, exercise, metformin, glipizide. He is not checking his sugars. glucose blood VI test strips (ASCENSIA AUTODISC VI, ONE TOUCH ULTRA TEST VI) strip      Lancets misc      Blood-Glucose Meter monitoring kit      metFORMIN ER 1,000 mg tr24   3. Benign hypertension without CHF I10 401.1 Stable. Continue diet, exercise and lisinopril, toprol. Pt needs to have his blood pressure controlled preoperatively, perioperatively, postoperatively. 4. Uncomplicated asthma, unspecified asthma severity, unspecified whether persistent J45.909 493.90 Stable. Continue the spiriva, suresh, and albuterol. 5. Schizophrenia, unspecified type ([facility] F20.9 295.90 Stable. Continue seroquel     6. Requested Prescriptions     Signed Prescriptions Disp Refills    glucose blood VI test strips (ASCENSIA AUTODISC VI, ONE TOUCH ULTRA TEST VI) strip 100 Strip 11     Sig: Check fasting sugars daily before breakfast    Lancets misc 100 Each 11     Sig: Check fasting sugars daily before breakfast    Blood-Glucose Meter monitoring kit 1 Kit 0     Sig: Check fasting sugars daily before breakfast    metFORMIN ER 1,000 mg tr24 180 Tab 3     Sig: TAKE 1 TABLET BY MOUTH 2 TIMES A DAY       7. Patient verbalized understanding and agreement with the plan. 8. Patient was given an after visit summary. 9.   Follow-up Disposition:  Return in about 3 months (around 10/11/2018) for f/u DM/HTN/lipids. or sooner if worsening symptoms.           Jimena Gee MD

## 2018-07-11 NOTE — PROGRESS NOTES
Identified pt with two pt identifiers(name and ). Reviewed record in preparation for visit and have obtained necessary documentation. Roly Rivera presents today for   Chief Complaint   Patient presents with    Pre-op Exam   Pt states he is having knee surgery on 2018 by Dr Chuckie Baer with [facility] preferred language for health care discussion is english/other. Is someone accompanying this pt? No    Is the patient using any DME equipment during OV? No    Depression Screening:  PHQ over the last two weeks 2016   Little interest or pleasure in doing things Several days Nearly every day Several days   Feeling down, depressed or hopeless Nearly every day Nearly every day Several days   Total Score PHQ 2 4 6 2       Learning Assessment:  Learning Assessment 2016   PRIMARY LEARNER Patient Patient   HIGHEST LEVEL OF EDUCATION - PRIMARY LEARNER  - 2 YEARS OF COLLEGE   PRIMARY LANGUAGE ENGLISH ENGLISH   LEARNER PREFERENCE PRIMARY LISTENING DEMONSTRATION   ANSWERED BY self patient   RELATIONSHIP SELF SELF       Abuse Screening:  No flowsheet data found. Fall Risk  Fall Risk Assessment, last 12 mths 2014   Able to walk? Yes   Fall in past 12 months? No       Health Maintenance reviewed and discussed per provider. Yes    .due  Please order/place referral if appropriate. Advance Directive:  1. Do you have an advance directive in place? Patient Reply: No    2. If not, would you like material regarding how to put one in place? Patient Reply: No    Coordination of Care:  1. Have you been to the ER, urgent care clinic since your last visit? Hospitalized since your last visit? No    2. Have you seen or consulted any other health care providers outside of the [address] [place] [address] since your last visit? Include any colon screening.  NO

## 2018-07-12 ENCOUNTER — OFFICE VISIT (OUTPATIENT)
Dept: ORTHOPEDIC SURGERY | Facility: CLINIC | Age: 48
End: 2018-07-12

## 2018-07-12 VITALS
RESPIRATION RATE: 16 BRPM | HEART RATE: 76 BPM | HEIGHT: 69 IN | WEIGHT: 216 LBS | OXYGEN SATURATION: 94 % | SYSTOLIC BLOOD PRESSURE: 123 MMHG | BODY MASS INDEX: 31.99 KG/M2 | TEMPERATURE: 97.9 F | DIASTOLIC BLOOD PRESSURE: 81 MMHG

## 2018-07-12 DIAGNOSIS — S83.206D ACUTE MENISCAL TEAR OF RIGHT KNEE, SUBSEQUENT ENCOUNTER: ICD-10-CM

## 2018-07-12 DIAGNOSIS — M17.32 POST-TRAUMATIC OSTEOARTHRITIS OF LEFT KNEE: ICD-10-CM

## 2018-07-12 DIAGNOSIS — M17.11 PRIMARY OSTEOARTHRITIS OF RIGHT KNEE: Primary | ICD-10-CM

## 2018-07-12 RX ORDER — ACETAMINOPHEN 325 MG/1
1000 TABLET ORAL ONCE
Status: CANCELLED | OUTPATIENT
Start: 2018-07-12 | End: 2018-07-12

## 2018-07-12 RX ORDER — CELECOXIB 100 MG/1
400 CAPSULE ORAL ONCE
Status: CANCELLED | OUTPATIENT
Start: 2018-07-12 | End: 2018-07-12

## 2018-07-12 RX ORDER — PREGABALIN 25 MG/1
75 CAPSULE ORAL ONCE
Status: CANCELLED | OUTPATIENT
Start: 2018-07-12 | End: 2018-07-12

## 2018-07-12 NOTE — H&P
[address] [facility], [address] [facility]  [phone]           HISTORY & PHYSICAL      Patient: Mercedes Cool                MRN: [id number]       SSN: [id number]  YOB: 1970        AGE: 51 y.o. SEX: male  Body mass index is 31.9 kg/(m^2). PCP: Natasha Oviedo MD  07/12/18      CC: right knee, medial meniscal tear  Problem List Items Addressed This Visit        Other    Primary osteoarthritis of right knee - Primary    Post-traumatic osteoarthritis of left knee      Other Visit Diagnoses     Acute meniscal tear of right knee, subsequent encounter                HPI:  The patient is a pleasant 51 y.o. whom has had increasing discomfort and mechanical type symptoms of his Right knee. MRI revealed a medial meniscal tear. Due to the current findings and affected activities of daily living, surgical intervention is indicated. The alternatives, risks, complications, as well as expected outcome were discussed. These include but are not limited to infection, blood loss, need for blood transfusion, neurovascular damage, DVT, PE,  post-op stiffness and pain, anesthetic complications, need for more surgery, MI, stroke, and even death. The patient understands and wishes to proceed with surgery. Past Medical History:   Diagnosis Date    Abnormal EKG 7/9/2018    Chronic ST-T changes noted likely from LVH. Prior cardiac workup includes stress test and cardiac catheterization. Reviewed    Advance directive discussed with patient 05/03/2017    Arthritis     left knee    Asthma     CAD (coronary artery disease)     heart attack 6/2012, Being followed by Dr. Shu Butterfield Cocaine use 2017    Depression     Diabetes ([facility] [address]     DVT (deep venous thrombosis) ([facility])     left leg in the past (Pt was on coumadin)    GERD (gastroesophageal reflux disease)     Hyperlipidemia 7/9/2018    cramps with pravastatin.   Consider alternate therapy after surgery    Hypertension     Hypertensive heart disease without heart failure 7/9/2018    Blood pressure control. LVH on EKG.  Hypertensive left ventricular hypertrophy, without heart failure 7/9/2018    Mild LVH on last echo.  Medication refill 7/9/2018    Pre-operative cardiovascular examination 7/9/2018    Knee surgery. Stable cardiac status. Okay to proceed with moderate cardiac risk    Psychiatric disorder     depression and schziophenia    Schizophrenia ([facility] [address]     Will be seeing [employer]    Type 2 diabetes mellitus without complication, without long-term current use of insulin ([facility] [address] 7/9/2018    On treatment    Unspecified sleep apnea     cpap machine,Will be seeing neurologist         Current Outpatient Prescriptions:     glucose blood VI test strips (ASCENSIA AUTODISC VI, ONE TOUCH ULTRA TEST VI) strip, Check fasting sugars daily before breakfast, Disp: 100 Strip, Rfl: 11    Lancets misc, Check fasting sugars daily before breakfast, Disp: 100 Each, Rfl: 11    Blood-Glucose Meter monitoring kit, Check fasting sugars daily before breakfast, Disp: 1 Kit, Rfl: 0    metFORMIN ER (GLUCOPHAGE XR) 500 mg tablet, Take 2 Tabs by mouth two (2) times a day., Disp: 360 Tab, Rfl: 3    metoprolol succinate (TOPROL-XL) 25 mg XL tablet, Take 1 Tab by mouth daily. , Disp: 90 Tab, Rfl: 1    lisinopril (PRINIVIL, ZESTRIL) 20 mg tablet, Take 1 Tab by mouth daily. , Disp: 90 Tab, Rfl: 1    fluticasone (FLONASE) 50 mcg/actuation nasal spray, 2 Sprays by Both Nostrils route daily. , Disp: 1 Bottle, Rfl: 0    glipiZIDE (GLUCOTROL) 10 mg tablet, Take 1 Tab by mouth two (2) times a day., Disp: 60 Tab, Rfl: 2    PARoxetine (PAXIL) 20 mg tablet, Take 20 mg by mouth daily. , Disp: , Rfl:     QUEtiapine (SEROQUEL) 100 mg tablet, Take 100 mg by mouth nightly., Disp: , Rfl:     cyclobenzaprine (FLEXERIL) 10 mg tablet, Take one po qhs prn muscle spasms, Disp: 15 Tab, Rfl: 0   cetirizine-psuedoePHEDrine (ZYRTEC-D) 5-120 mg per tablet, Take 1 Tab by mouth two (2) times a day., Disp: 24 Tab, Rfl: 2    DULoxetine (CYMBALTA) 30 mg capsule, TAKE 1 CAPSULE BY MOUTH EVERY MORNING, Disp: 30 Cap, Rfl: 0    docusate sodium (COLACE) 100 mg capsule, Take one po daily prn constipation, Disp: 90 Cap, Rfl: 3    lidocaine (XYLOCAINE) 5 % ointment, APPLY THIN FILM RUB EXTERNALLY TWICE DAILY IN THE MORNING AND EVENING, Disp: , Rfl: 3    omeprazole (PRILOSEC) 40 mg capsule, as needed, Disp: , Rfl: 10    albuterol (PROVENTIL HFA, VENTOLIN HFA, PROAIR HFA) 90 mcg/actuation inhaler, 1-2 Puffs., Disp: , Rfl:     loratadine-pseudoephedrine (CLARITIN-D 24-HOUR)  mg per tablet, Take 1 Tab by Mouth Once a Day. , Disp: , Rfl:     omega 3-dha-epa-fish oil (FISH OIL) 60- mg cap, Take 500 mg by mouth daily. , Disp: , Rfl:     pravastatin (PRAVACHOL) 20 mg tablet, Take 20 mg by mouth nightly., Disp: , Rfl:     tiotropium (SPIRIVA) 18 mcg inhalation capsule, Take 1 Cap by inhalation daily. , Disp: , Rfl:     ASPIRIN PO, Take 81 mg by mouth., Disp: , Rfl:     montelukast (SINGULAIR) 10 mg tablet, Take 10 mg by mouth daily. , Disp: , Rfl:     nitroglycerin (NITROSTAT) 0.4 mg SL tablet, 0.4 mg by SubLINGual route every five (5) minutes as needed for Chest Pain., Disp: , Rfl:     fluticasone-salmeterol (ADVAIR DISKUS) 500-50 mcg/dose diskus inhaler, Take 1 Puff by inhalation every twelve (12) hours. , Disp: , Rfl:     Allergies   Allergen Reactions    Iodine Anaphylaxis    Norco [Hydrocodone-Acetaminophen] Nausea and Vomiting     Gi distress    Shellfish Derived Swelling and Angioedema     Other reaction(s): anaphylaxis/angioedema  [place] [facility]       Social History     Social History    Marital status: SINGLE     Spouse name: N/A    Number of children: N/A    Years of education: N/A     Occupational History    Not on file.      Social History Main Topics    Smoking status: Current Every Day Smoker     Packs/day: 0.25     Types: Cigars    Smokeless tobacco: Never Used    Alcohol use Yes      Comment: \"a little\"/ occ    Drug use: Yes     Special: Marijuana      Comment: pt denies but pos for cocaine in 2017 and pos for marijuana in 2018    Sexual activity: Yes     Partners: Female     Other Topics Concern    Not on file     Social History Narrative       Past Surgical History:   Procedure Laterality Date    ABDOMEN SURGERY PROC UNLISTED      HX COLONOSCOPY      HX HEART CATHETERIZATION  2014    cardiac cath - normal coronaries    HX HERNIA REPAIR      HX KNEE REPLACEMENT      left TKR    HX ORTHOPAEDIC      L knee 1987, 1996    HX OTHER SURGICAL      eye sx at 2 yrs old       Family History:  Non-contributory. PE:  Visit Vitals    /81    Pulse 76    Temp 97.9 °F (36.6 °C) (Oral)    Resp 16    Ht 5' 9\" (1.753 m)    Wt 216 lb (98 kg)    SpO2 94%    BMI 31.9 kg/m2     A&O X3, NAD, well develop, well nourished  Heart: S1-S2, rrr  Lungs: CTA bilat  Abd: soft, nt, nt, + bs in all quadrants  Ext:  Pos distal pulses to DP, PT  Right  Knee with pain to the medial joint line. Pos glynn test    X-ray: right knee shows advancing OA    MRI-  1. Complex tear of the right knee medial meniscus involving the body and  posterior horn, extending to and involving the meniscal root. 2. Considerably truncated appearance to the lateral meniscus body and posterior  horn, the appearance of which is most in keeping with prior meniscectomy. 3. Tricompartmental right knee chondrosis as above, most pronounced in the  tibiofemoral compartments. 4. Large joint effusion with several intra-articular bodies present in the  posterior/dependent portion of the intercondylar notch. 5. Large multilocular Baker's cyst.             Labs: labs were reviewed and wnl.  ua neg, culture pos, treated with levaquin    A:  Right  Knee, medial meniscal tear. P:  At this point we will move forward with surgery. Again, the alternatives, risks, complications, as well as expected outcome were discussed and the patient wishes to proceed with surgery. Pt has been instructed to stop aspirin, nsaids, rheumatologic medications and blood thinners. They have also been instructed to continue on any heart and bp meds and to take them the morning of surgery with sips of water.             Jonathon Kelly

## 2018-07-14 ENCOUNTER — ANESTHESIA EVENT (OUTPATIENT)
Dept: SURGERY | Age: 48
End: 2018-07-14
Payer: MEDICARE

## 2018-07-16 ENCOUNTER — HOSPITAL ENCOUNTER (OUTPATIENT)
Age: 48
Setting detail: OUTPATIENT SURGERY
Discharge: HOME | End: 2018-07-16
Attending: ORTHOPAEDIC SURGERY | Admitting: ORTHOPAEDIC SURGERY
Payer: MEDICARE

## 2018-07-16 ENCOUNTER — ANESTHESIA (OUTPATIENT)
Dept: SURGERY | Age: 48
End: 2018-07-16
Payer: MEDICARE

## 2018-07-16 VITALS
DIASTOLIC BLOOD PRESSURE: 80 MMHG | RESPIRATION RATE: 14 BRPM | HEIGHT: 69 IN | SYSTOLIC BLOOD PRESSURE: 122 MMHG | BODY MASS INDEX: 31.99 KG/M2 | WEIGHT: 216 LBS | TEMPERATURE: 97.5 F | OXYGEN SATURATION: 94 % | HEART RATE: 59 BPM

## 2018-07-16 DIAGNOSIS — M17.31 POST-TRAUMATIC OSTEOARTHRITIS OF RIGHT KNEE: Primary | ICD-10-CM

## 2018-07-16 DIAGNOSIS — Z98.890 S/P SURGICAL MANIPULATION OF KNEE JOINT: ICD-10-CM

## 2018-07-16 LAB
AMPHET UR QL SCN: NEGATIVE
BARBITURATES UR QL SCN: NEGATIVE
BENZODIAZ UR QL: NEGATIVE
CANNABINOIDS UR QL SCN: POSITIVE
COCAINE UR QL SCN: NEGATIVE
GLUCOSE BLD STRIP.AUTO-MCNC: 75 MG/DL (ref 70–110)
GLUCOSE BLD STRIP.AUTO-MCNC: 77 MG/DL (ref 70–110)
HDSCOM,HDSCOM: ABNORMAL
METHADONE UR QL: NEGATIVE
OPIATES UR QL: NEGATIVE
PCP UR QL: NEGATIVE

## 2018-07-16 PROCEDURE — 74011250636 HC RX REV CODE- 250/636

## 2018-07-16 PROCEDURE — 77030022036 HC BLD SHV TOMCAT STRY -B: Performed by: ORTHOPAEDIC SURGERY

## 2018-07-16 PROCEDURE — 77030019605: Performed by: ORTHOPAEDIC SURGERY

## 2018-07-16 PROCEDURE — 74011000258 HC RX REV CODE- 258: Performed by: ORTHOPAEDIC SURGERY

## 2018-07-16 PROCEDURE — 77030028224 HC PDNG CST BSNM -A: Performed by: ORTHOPAEDIC SURGERY

## 2018-07-16 PROCEDURE — 77030008574 HC TBNG SUC IRR STRY -B: Performed by: ORTHOPAEDIC SURGERY

## 2018-07-16 PROCEDURE — 74011250636 HC RX REV CODE- 250/636: Performed by: NURSE ANESTHETIST, CERTIFIED REGISTERED

## 2018-07-16 PROCEDURE — 80307 DRUG TEST PRSMV CHEM ANLYZR: CPT

## 2018-07-16 PROCEDURE — 77030020782 HC GWN BAIR PAWS FLX 3M -B: Performed by: ORTHOPAEDIC SURGERY

## 2018-07-16 PROCEDURE — 77030012890: Performed by: ORTHOPAEDIC SURGERY

## 2018-07-16 PROCEDURE — 82962 GLUCOSE BLOOD TEST: CPT

## 2018-07-16 PROCEDURE — 76010000149 HC OR TIME 1 TO 1.5 HR: Performed by: ORTHOPAEDIC SURGERY

## 2018-07-16 PROCEDURE — 76210000016 HC OR PH I REC 1 TO 1.5 HR: Performed by: ORTHOPAEDIC SURGERY

## 2018-07-16 PROCEDURE — 74011250636 HC RX REV CODE- 250/636: Performed by: PHYSICIAN ASSISTANT

## 2018-07-16 PROCEDURE — 76060000033 HC ANESTHESIA 1 TO 1.5 HR: Performed by: ORTHOPAEDIC SURGERY

## 2018-07-16 PROCEDURE — 76210000021 HC REC RM PH II 0.5 TO 1 HR: Performed by: ORTHOPAEDIC SURGERY

## 2018-07-16 PROCEDURE — 74011000250 HC RX REV CODE- 250: Performed by: NURSE ANESTHETIST, CERTIFIED REGISTERED

## 2018-07-16 PROCEDURE — 74011000250 HC RX REV CODE- 250: Performed by: ORTHOPAEDIC SURGERY

## 2018-07-16 PROCEDURE — 77030020754 HC CUF TRNQT 2BLA STRY -B: Performed by: ORTHOPAEDIC SURGERY

## 2018-07-16 PROCEDURE — 77030018836 HC SOL IRR NACL ICUM -A: Performed by: ORTHOPAEDIC SURGERY

## 2018-07-16 PROCEDURE — 74011250637 HC RX REV CODE- 250/637: Performed by: PHYSICIAN ASSISTANT

## 2018-07-16 PROCEDURE — 77030010509 HC AIRWY LMA MSK TELE -A: Performed by: ANESTHESIOLOGY

## 2018-07-16 PROCEDURE — 74011000250 HC RX REV CODE- 250

## 2018-07-16 PROCEDURE — 77030002933 HC SUT MCRYL J&J -A: Performed by: ORTHOPAEDIC SURGERY

## 2018-07-16 RX ORDER — DEXTROSE 50 % IN WATER (D50W) INTRAVENOUS SYRINGE
25-50 AS NEEDED
Status: DISCONTINUED | OUTPATIENT
Start: 2018-07-16 | End: 2018-07-16 | Stop reason: HOSPADM

## 2018-07-16 RX ORDER — MIDAZOLAM HYDROCHLORIDE 1 MG/ML
2 INJECTION, SOLUTION INTRAMUSCULAR; INTRAVENOUS ONCE
Status: DISCONTINUED | OUTPATIENT
Start: 2018-07-16 | End: 2018-07-16 | Stop reason: HOSPADM

## 2018-07-16 RX ORDER — FENTANYL CITRATE 50 UG/ML
INJECTION, SOLUTION INTRAMUSCULAR; INTRAVENOUS AS NEEDED
Status: DISCONTINUED | OUTPATIENT
Start: 2018-07-16 | End: 2018-07-16 | Stop reason: HOSPADM

## 2018-07-16 RX ORDER — SODIUM CHLORIDE, SODIUM LACTATE, POTASSIUM CHLORIDE, CALCIUM CHLORIDE 600; 310; 30; 20 MG/100ML; MG/100ML; MG/100ML; MG/100ML
75 INJECTION, SOLUTION INTRAVENOUS CONTINUOUS
Status: DISCONTINUED | OUTPATIENT
Start: 2018-07-16 | End: 2018-07-16 | Stop reason: HOSPADM

## 2018-07-16 RX ORDER — OXYCODONE AND ACETAMINOPHEN 5; 325 MG/1; MG/1
TABLET ORAL
Qty: 35 TAB | Refills: 0 | Status: SHIPPED | OUTPATIENT
Start: 2018-07-16 | End: 2018-08-02 | Stop reason: SDUPTHER

## 2018-07-16 RX ORDER — PROPOFOL 10 MG/ML
INJECTION, EMULSION INTRAVENOUS AS NEEDED
Status: DISCONTINUED | OUTPATIENT
Start: 2018-07-16 | End: 2018-07-16 | Stop reason: HOSPADM

## 2018-07-16 RX ORDER — MAGNESIUM SULFATE 100 %
4 CRYSTALS MISCELLANEOUS AS NEEDED
Status: DISCONTINUED | OUTPATIENT
Start: 2018-07-16 | End: 2018-07-16 | Stop reason: HOSPADM

## 2018-07-16 RX ORDER — DEXAMETHASONE SODIUM PHOSPHATE 4 MG/ML
INJECTION, SOLUTION INTRA-ARTICULAR; INTRALESIONAL; INTRAMUSCULAR; INTRAVENOUS; SOFT TISSUE AS NEEDED
Status: DISCONTINUED | OUTPATIENT
Start: 2018-07-16 | End: 2018-07-16 | Stop reason: HOSPADM

## 2018-07-16 RX ORDER — ROPIVACAINE HYDROCHLORIDE 2 MG/ML
60 INJECTION, SOLUTION EPIDURAL; INFILTRATION; PERINEURAL
Status: DISCONTINUED | OUTPATIENT
Start: 2018-07-16 | End: 2018-07-16 | Stop reason: HOSPADM

## 2018-07-16 RX ORDER — CELECOXIB 400 MG/1
400 CAPSULE ORAL ONCE
Status: COMPLETED | OUTPATIENT
Start: 2018-07-16 | End: 2018-07-16

## 2018-07-16 RX ORDER — HYDROMORPHONE HYDROCHLORIDE 2 MG/ML
0.2 INJECTION, SOLUTION INTRAMUSCULAR; INTRAVENOUS; SUBCUTANEOUS
Status: DISCONTINUED | OUTPATIENT
Start: 2018-07-16 | End: 2018-07-16 | Stop reason: HOSPADM

## 2018-07-16 RX ORDER — ONDANSETRON 2 MG/ML
INJECTION INTRAMUSCULAR; INTRAVENOUS AS NEEDED
Status: DISCONTINUED | OUTPATIENT
Start: 2018-07-16 | End: 2018-07-16 | Stop reason: HOSPADM

## 2018-07-16 RX ORDER — INSULIN LISPRO 100 [IU]/ML
INJECTION, SOLUTION INTRAVENOUS; SUBCUTANEOUS ONCE
Status: DISCONTINUED | OUTPATIENT
Start: 2018-07-16 | End: 2018-07-16 | Stop reason: HOSPADM

## 2018-07-16 RX ORDER — CEFAZOLIN SODIUM 2 G/50ML
2 SOLUTION INTRAVENOUS
Status: COMPLETED | OUTPATIENT
Start: 2018-07-16 | End: 2018-07-16

## 2018-07-16 RX ORDER — MIDAZOLAM HYDROCHLORIDE 1 MG/ML
INJECTION, SOLUTION INTRAMUSCULAR; INTRAVENOUS AS NEEDED
Status: DISCONTINUED | OUTPATIENT
Start: 2018-07-16 | End: 2018-07-16 | Stop reason: HOSPADM

## 2018-07-16 RX ORDER — ACETAMINOPHEN 500 MG
1000 TABLET ORAL ONCE
Status: COMPLETED | OUTPATIENT
Start: 2018-07-16 | End: 2018-07-16

## 2018-07-16 RX ORDER — SODIUM CHLORIDE 0.9 % (FLUSH) 0.9 %
5-10 SYRINGE (ML) INJECTION EVERY 8 HOURS
Status: DISCONTINUED | OUTPATIENT
Start: 2018-07-16 | End: 2018-07-16 | Stop reason: HOSPADM

## 2018-07-16 RX ORDER — BUPIVACAINE HYDROCHLORIDE AND EPINEPHRINE 2.5; 5 MG/ML; UG/ML
INJECTION, SOLUTION EPIDURAL; INFILTRATION; INTRACAUDAL; PERINEURAL AS NEEDED
Status: DISCONTINUED | OUTPATIENT
Start: 2018-07-16 | End: 2018-07-16 | Stop reason: HOSPADM

## 2018-07-16 RX ORDER — LIDOCAINE HYDROCHLORIDE 10 MG/ML
0.1 INJECTION, SOLUTION EPIDURAL; INFILTRATION; INTRACAUDAL; PERINEURAL AS NEEDED
Status: DISCONTINUED | OUTPATIENT
Start: 2018-07-16 | End: 2018-07-16 | Stop reason: HOSPADM

## 2018-07-16 RX ORDER — GLYCOPYRROLATE 0.2 MG/ML
INJECTION INTRAMUSCULAR; INTRAVENOUS AS NEEDED
Status: DISCONTINUED | OUTPATIENT
Start: 2018-07-16 | End: 2018-07-16 | Stop reason: HOSPADM

## 2018-07-16 RX ORDER — LIDOCAINE HYDROCHLORIDE 20 MG/ML
INJECTION, SOLUTION EPIDURAL; INFILTRATION; INTRACAUDAL; PERINEURAL AS NEEDED
Status: DISCONTINUED | OUTPATIENT
Start: 2018-07-16 | End: 2018-07-16 | Stop reason: HOSPADM

## 2018-07-16 RX ORDER — PREGABALIN 75 MG/1
75 CAPSULE ORAL ONCE
Status: COMPLETED | OUTPATIENT
Start: 2018-07-16 | End: 2018-07-16

## 2018-07-16 RX ORDER — SODIUM CHLORIDE 0.9 % (FLUSH) 0.9 %
5-10 SYRINGE (ML) INJECTION AS NEEDED
Status: DISCONTINUED | OUTPATIENT
Start: 2018-07-16 | End: 2018-07-16 | Stop reason: HOSPADM

## 2018-07-16 RX ORDER — FENTANYL CITRATE 50 UG/ML
100 INJECTION, SOLUTION INTRAMUSCULAR; INTRAVENOUS ONCE
Status: DISCONTINUED | OUTPATIENT
Start: 2018-07-16 | End: 2018-07-16 | Stop reason: HOSPADM

## 2018-07-16 RX ADMIN — DEXAMETHASONE SODIUM PHOSPHATE 4 MG: 4 INJECTION, SOLUTION INTRA-ARTICULAR; INTRALESIONAL; INTRAMUSCULAR; INTRAVENOUS; SOFT TISSUE at 10:48

## 2018-07-16 RX ADMIN — ONDANSETRON 4 MG: 2 INJECTION INTRAMUSCULAR; INTRAVENOUS at 10:32

## 2018-07-16 RX ADMIN — CELECOXIB 400 MG: 400 CAPSULE ORAL at 09:30

## 2018-07-16 RX ADMIN — FENTANYL CITRATE 50 MCG: 50 INJECTION, SOLUTION INTRAMUSCULAR; INTRAVENOUS at 10:46

## 2018-07-16 RX ADMIN — ONDANSETRON 4 MG: 2 INJECTION INTRAMUSCULAR; INTRAVENOUS at 11:35

## 2018-07-16 RX ADMIN — PROPOFOL 200 MG: 10 INJECTION, EMULSION INTRAVENOUS at 10:37

## 2018-07-16 RX ADMIN — SODIUM CHLORIDE, SODIUM LACTATE, POTASSIUM CHLORIDE, AND CALCIUM CHLORIDE 75 ML/HR: 600; 310; 30; 20 INJECTION, SOLUTION INTRAVENOUS at 09:28

## 2018-07-16 RX ADMIN — HYDROMORPHONE HYDROCHLORIDE 0.2 MG: 2 INJECTION INTRAMUSCULAR; INTRAVENOUS; SUBCUTANEOUS at 12:33

## 2018-07-16 RX ADMIN — FAMOTIDINE 20 MG: 10 INJECTION, SOLUTION INTRAVENOUS at 09:28

## 2018-07-16 RX ADMIN — GLYCOPYRROLATE 0.2 MG: 0.2 INJECTION INTRAMUSCULAR; INTRAVENOUS at 10:52

## 2018-07-16 RX ADMIN — LIDOCAINE HYDROCHLORIDE 100 MG: 20 INJECTION, SOLUTION EPIDURAL; INFILTRATION; INTRACAUDAL; PERINEURAL at 10:37

## 2018-07-16 RX ADMIN — FENTANYL CITRATE 50 MCG: 50 INJECTION, SOLUTION INTRAMUSCULAR; INTRAVENOUS at 11:09

## 2018-07-16 RX ADMIN — MIDAZOLAM HYDROCHLORIDE 2 MG: 1 INJECTION, SOLUTION INTRAMUSCULAR; INTRAVENOUS at 10:31

## 2018-07-16 RX ADMIN — PREGABALIN 75 MG: 75 CAPSULE ORAL at 09:30

## 2018-07-16 RX ADMIN — FENTANYL CITRATE 50 MCG: 50 INJECTION, SOLUTION INTRAMUSCULAR; INTRAVENOUS at 10:55

## 2018-07-16 RX ADMIN — HYDROMORPHONE HYDROCHLORIDE 0.2 MG: 2 INJECTION INTRAMUSCULAR; INTRAVENOUS; SUBCUTANEOUS at 12:23

## 2018-07-16 RX ADMIN — ACETAMINOPHEN 1000 MG: 500 TABLET, FILM COATED ORAL at 09:30

## 2018-07-16 RX ADMIN — FENTANYL CITRATE 50 MCG: 50 INJECTION, SOLUTION INTRAMUSCULAR; INTRAVENOUS at 11:03

## 2018-07-16 RX ADMIN — CEFAZOLIN SODIUM 2 G: 2 SOLUTION INTRAVENOUS at 10:39

## 2018-07-16 NOTE — H&P (VIEW-ONLY)
[address], [address] [facility] [address] [phone] HISTORY & PHYSICAL Patient: Tammie Biswas                MRN: [id number]       SSN: [id number] YOB: 1970        AGE: 51 y.o. SEX: male Body mass index is 31.9 kg/(m^2). PCP: Nitin Jones MD 
07/12/18 CC: right knee, medial meniscal tear Problem List Items Addressed This Visit Other Primary osteoarthritis of right knee - Primary Post-traumatic osteoarthritis of left knee Other Visit Diagnoses Acute meniscal tear of right knee, subsequent encounter HPI:  The patient is a pleasant 51 y.o. whom has had increasing discomfort and mechanical type symptoms of his Right knee. MRI revealed a medial meniscal tear. Due to the current findings and affected activities of daily living, surgical intervention is indicated. The alternatives, risks, complications, as well as expected outcome were discussed. These include but are not limited to infection, blood loss, need for blood transfusion, neurovascular damage, DVT, PE,  post-op stiffness and pain, anesthetic complications, need for more surgery, MI, stroke, and even death. The patient understands and wishes to proceed with surgery. Past Medical History:  
Diagnosis Date  Abnormal EKG 7/9/2018 Chronic ST-T changes noted likely from LVH. Prior cardiac workup includes stress test and cardiac catheterization. Reviewed  Advance directive discussed with patient 05/03/2017  Arthritis   
 left knee  Asthma  CAD (coronary artery disease)   
 heart attack 6/2012, Being followed by Dr. Ramos Standard  Cocaine use 2017  Depression  Diabetes ([facility] [address]  DVT (deep venous thrombosis) ([facility])   
 left leg in the past (Pt was on coumadin)  GERD (gastroesophageal reflux disease)  Hyperlipidemia 7/9/2018  
 cramps with pravastatin.   Consider alternate therapy after surgery  Hypertension  Hypertensive heart disease without heart failure 7/9/2018 Blood pressure control. LVH on EKG.  Hypertensive left ventricular hypertrophy, without heart failure 7/9/2018 Mild LVH on last echo.  Medication refill 7/9/2018  Pre-operative cardiovascular examination 7/9/2018 Knee surgery. Stable cardiac status. Okay to proceed with moderate cardiac risk  Psychiatric disorder   
 depression and schziophenia  Schizophrenia ([facility] [address] Will be seeing [employer]  Type 2 diabetes mellitus without complication, without long-term current use of insulin ([facility] [address] 7/9/2018 On treatment  Unspecified sleep apnea   
 cpap machine,Will be seeing neurologist  
 
 
 
Current Outpatient Prescriptions:  
  glucose blood VI test strips (ASCENSIA AUTODISC VI, ONE TOUCH ULTRA TEST VI) strip, Check fasting sugars daily before breakfast, Disp: 100 Strip, Rfl: 11   Lancets misc, Check fasting sugars daily before breakfast, Disp: 100 Each, Rfl: 11   Blood-Glucose Meter monitoring kit, Check fasting sugars daily before breakfast, Disp: 1 Kit, Rfl: 0 
  metFORMIN ER (GLUCOPHAGE XR) 500 mg tablet, Take 2 Tabs by mouth two (2) times a day., Disp: 360 Tab, Rfl: 3 
  metoprolol succinate (TOPROL-XL) 25 mg XL tablet, Take 1 Tab by mouth daily. , Disp: 90 Tab, Rfl: 1 
  lisinopril (PRINIVIL, ZESTRIL) 20 mg tablet, Take 1 Tab by mouth daily. , Disp: 90 Tab, Rfl: 1 
  fluticasone (FLONASE) 50 mcg/actuation nasal spray, 2 Sprays by Both Nostrils route daily. , Disp: 1 Bottle, Rfl: 0 
  glipiZIDE (GLUCOTROL) 10 mg tablet, Take 1 Tab by mouth two (2) times a day., Disp: 60 Tab, Rfl: 2 
  PARoxetine (PAXIL) 20 mg tablet, Take 20 mg by mouth daily. , Disp: , Rfl:  
  QUEtiapine (SEROQUEL) 100 mg tablet, Take 100 mg by mouth nightly., Disp: , Rfl:  
  cyclobenzaprine (FLEXERIL) 10 mg tablet, Take one po qhs prn muscle spasms, Disp: 15 Tab, Rfl: 0 
 cetirizine-psuedoePHEDrine (ZYRTEC-D) 5-120 mg per tablet, Take 1 Tab by mouth two (2) times a day., Disp: 24 Tab, Rfl: 2 
  DULoxetine (CYMBALTA) 30 mg capsule, TAKE 1 CAPSULE BY MOUTH EVERY MORNING, Disp: 30 Cap, Rfl: 0 
  docusate sodium (COLACE) 100 mg capsule, Take one po daily prn constipation, Disp: 90 Cap, Rfl: 3 
  lidocaine (XYLOCAINE) 5 % ointment, APPLY THIN FILM RUB EXTERNALLY TWICE DAILY IN THE MORNING AND EVENING, Disp: , Rfl: 3 
  omeprazole (PRILOSEC) 40 mg capsule, as needed, Disp: , Rfl: 10 
  albuterol (PROVENTIL HFA, VENTOLIN HFA, PROAIR HFA) 90 mcg/actuation inhaler, 1-2 Puffs., Disp: , Rfl:  
  loratadine-pseudoephedrine (CLARITIN-D 24-HOUR)  mg per tablet, Take 1 Tab by Mouth Once a Day. , Disp: , Rfl:  
  omega 3-dha-epa-fish oil (FISH OIL) 60- mg cap, Take 500 mg by mouth daily. , Disp: , Rfl:  
  pravastatin (PRAVACHOL) 20 mg tablet, Take 20 mg by mouth nightly., Disp: , Rfl:  
  tiotropium (SPIRIVA) 18 mcg inhalation capsule, Take 1 Cap by inhalation daily. , Disp: , Rfl:  
  ASPIRIN PO, Take 81 mg by mouth., Disp: , Rfl:  
  montelukast (SINGULAIR) 10 mg tablet, Take 10 mg by mouth daily. , Disp: , Rfl:  
  nitroglycerin (NITROSTAT) 0.4 mg SL tablet, 0.4 mg by SubLINGual route every five (5) minutes as needed for Chest Pain., Disp: , Rfl:  
  fluticasone-salmeterol (ADVAIR DISKUS) 500-50 mcg/dose diskus inhaler, Take 1 Puff by inhalation every twelve (12) hours. , Disp: , Rfl:  
 
Allergies Allergen Reactions  Iodine Anaphylaxis  Norco [Hydrocodone-Acetaminophen] Nausea and Vomiting Gi distress  Shellfish Derived Swelling and Angioedema Other reaction(s): anaphylaxis/angioedema [address] [facility] Social History Social History  Marital status: SINGLE Spouse name: N/A  
 Number of children: N/A  
 Years of education: N/A Occupational History  Not on file. Social History Main Topics  Smoking status: Current Every Day Smoker Packs/day: 0.25 Types: Cigars  Smokeless tobacco: Never Used  Alcohol use Yes Comment: \"a little\"/ occ  Drug use: Yes Special: Marijuana Comment: pt denies but pos for cocaine in 2017 and pos for marijuana in 2018  Sexual activity: Yes  
  Partners: Female Other Topics Concern  Not on file Social History Narrative Past Surgical History:  
Procedure Laterality Date [address] UNLISTED  HX COLONOSCOPY    
 HX HEART CATHETERIZATION  2014  
 cardiac cath - normal coronaries  HX HERNIA REPAIR    
 HX KNEE REPLACEMENT    
 left TKR  HX ORTHOPAEDIC    
 L knee 1987, 1996  HX OTHER SURGICAL    
 eye sx at 2 yrs old Family History:  Non-contributory. PE: 
Visit Vitals  /81  Pulse 76  Temp 97.9 °F (36.6 °C) (Oral)  Resp 16  
 Ht 5' 9\" (1.753 m)  Wt 216 lb (98 kg)  SpO2 94%  BMI 31.9 kg/m2 A&O X3, NAD, well develop, well nourished Heart: S1-S2, rrr 
Lungs: CTA bilat Abd: soft, nt, nt, + bs in all quadrants Ext:  Pos distal pulses to DP, PT Right  Knee with pain to the medial joint line. Pos glynn test 
 
X-ray: right knee shows advancing OA MRI- 1. Complex tear of the right knee medial meniscus involving the body and 
posterior horn, extending to and involving the meniscal root. 2. Considerably truncated appearance to the lateral meniscus body and posterior 
horn, the appearance of which is most in keeping with prior meniscectomy. 3. Tricompartmental right knee chondrosis as above, most pronounced in the 
tibiofemoral compartments. 4. Large joint effusion with several intra-articular bodies present in the 
posterior/dependent portion of the intercondylar notch. 5. Large multilocular Baker's cyst.  
   
 
 
 
Labs: labs were reviewed and wnl.  ua neg, culture pos, treated with levaquin A:  Right  Knee, medial meniscal tear. P:  At this point we will move forward with surgery. Again, the alternatives, risks, complications, as well as expected outcome were discussed and the patient wishes to proceed with surgery. Pt has been instructed to stop aspirin, nsaids, rheumatologic medications and blood thinners. They have also been instructed to continue on any heart and bp meds and to take them the morning of surgery with sips of water. Latanya Gaston

## 2018-07-16 NOTE — INTERVAL H&P NOTE
H&P Update:  Loida Muir was seen and examined. History and physical has been reviewed. The patient has been examined.  There have been no significant clinical changes since the completion of the originally dated History and Physical.    Signed By: Naren Whitfield MD     July 16, 2018 10:08 AM

## 2018-07-16 NOTE — DISCHARGE INSTRUCTIONS
Knee Arthroscopy: What to Expect at Home  Your Recovery    Arthroscopy is a way to find problems and do surgery inside a joint without making a large cut (incision). Your doctor put a lighted tube with a tiny camera-called an arthroscope, or scope-and surgical tools through small incisions in your knee. You will feel tired for several days. Your knee will be swollen, and you may notice that your skin is a different color near the cuts (incisions). The swelling is normal and will start to go away in a few days. Keeping your leg higher than your heart will help with swelling and pain. You will probably need about 6 weeks to recover. If your doctor repaired damaged tissue, recovery will take longer. You may have to limit your activity until your knee strength and movement return to normal. You may also be in a physical rehabilitation (rehab) program.  You may be able to return to a desk job or your normal routine in a few days. But if you do physical labor, it may be as long as 2 months before you can return to work. This care sheet gives you a general idea about how long it will take for you to recover. But each person recovers at a different pace. Follow the steps below to get better as quickly as possible. How can you care for yourself at home? Activity    · Rest when you feel tired. Getting enough sleep will help you recover. Use pillows to raise your ankle and leg above the level of your heart.     · Try to walk each day, after your doctor has said you can. Start by walking a little more than you did the day before. Bit by bit, increase the amount you walk. Walking boosts blood flow and helps prevent pneumonia and constipation.     · You may have a brace or crutches or both.     · Your doctor will tell you how often and how much you can move your leg and knee.     · If you have a desk job, you may be able to return to work a few days after the surgery.  If you lift things or stand or walk a lot at work, it may be as long as 2 months before you can return.     · You can take a shower 48 to 72 hours after surgery and clean the incisions with regular soap and water. Do not take a bath or soak your knee until your doctor says it is okay.     · Ask your doctor when you can drive again.     · If you had a repair of torn tissue, follow your doctor's instructions for lifting things or moving your knee. Diet    · You can eat your normal diet. If your stomach is upset, try bland, low-fat foods like plain rice, broiled chicken, toast, and yogurt.     · Drink plenty of fluids, unless your doctor tells you not to.     · You may notice that your bowel movements are not regular right after your surgery. This is common. Try to avoid constipation and straining with bowel movements. You may want to take a fiber supplement every day. If you have not had a bowel movement after a couple of days, ask your doctor about taking a mild laxative. Medicines    · Your doctor will tell you if and when you can restart your medicines. He or she will also give you instructions about taking any new medicines.     · If you take blood thinners, such as warfarin (Coumadin), clopidogrel (Plavix), or aspirin, be sure to talk to your doctor. He or she will tell you if and when to start taking those medicines again. Make sure that you understand exactly what your doctor wants you to do.     · Be safe with medicines. Take pain medicines exactly as directed. ¨ If the doctor gave you a prescription medicine for pain, take it as prescribed. ¨ If you are not taking a prescription pain medicine, ask your doctor if you can take an over-the-counter medicine.     · If you think your pain medicine is making you sick to your stomach:  ¨ Take your medicine after meals (unless your doctor has told you not to). ¨ Ask your doctor for a different pain medicine.     · If your doctor prescribed antibiotics, take them as directed.  Do not stop taking them just because you feel better. You need to take the full course of antibiotics. Incision care    · If you have a dressing over your cuts (incisions), keep it clean and dry. You may remove it 48 to 72 hours after the surgery.     · If your incisions are open to the air, keep the area clean and dry.     · If you have strips of tape on the incisions, leave the tape on for a week or until it falls off. Exercise    · Move your toes and ankle as much as your bandages will allow.     · Bend and straighten your knee slowly several times during the day.     · Depending on why you had the surgery, you may have to do ankle and leg exercises. Your doctor or physical therapist will give you exercises as part of a rehabilitation program.     · Stop any activity that causes sharp pain. Talk to your doctor or physical therapist about what sports or other exercise you can do. Ice and elevation    · To reduce swelling and pain, put ice or a cold pack on your knee for 10 to 20 minutes at a time. Do this every 1 to 2 hours. Put a thin cloth between the ice and your skin. Follow-up care is a key part of your treatment and safety. Be sure to make and go to all appointments, and call your doctor if you are having problems. It's also a good idea to know your test results and keep a list of the medicines you take. When should you call for help? Call 911 anytime you think you may need emergency care. For example, call if:    · You passed out (lost consciousness).     · You have severe trouble breathing.     · You have sudden chest pain and shortness of breath, or you cough up blood.    Call your doctor now or seek immediate medical care if:    · Your foot or toes are numb or tingling.     · Your foot is cool or pale, or it changes color.     · You have signs of a blood clot, such as:  ¨ Pain in your calf, back of the knee, thigh, or groin. ¨ Redness and swelling in your leg or groin.     · You are sick to your stomach or cannot keep fluids down.   · You have pain that does not get better after you take pain medicine.     · You have loose stitches, or your incision comes open.     · Bright red blood has soaked through the bandage over your incision.     · You have signs of infection, such as:  ¨ Increased pain, swelling, warmth, or redness. ¨ Red streaks leading from the incisions. ¨ Pus draining from the incisions. ¨ A fever.    Watch closely for any changes in your health, and be sure to contact your doctor if:    · You do not have a bowel movement after taking a laxative. Where can you learn more? Go to http://carlos a-fernando.info/. Enter R479 in the search box to learn more about \"Knee Arthroscopy: What to Expect at Home. \"  Current as of: November 29, 2017  Content Version: 11.7  © 4879-9335 [employer]. Care instructions adapted under license by [employer] (which disclaims liability or warranty for this information). If you have questions about a medical condition or this instruction, always ask your healthcare professional. [place] [address] any warranty or liability for your use of this information. DISCHARGE SUMMARY from Nurse    PATIENT INSTRUCTIONS:    After general anesthesia or intravenous sedation, for 24 hours or while taking prescription Narcotics:  · Limit your activities  · Do not drive and operate hazardous machinery  · Do not make important personal or business decisions  · Do  not drink alcoholic beverages  · If you have not urinated within 8 hours after discharge, please contact your surgeon on call. *  Please give a list of your current medications to your Primary Care Provider. *  Please update this list whenever your medications are discontinued, doses are      changed, or new medications (including over-the-counter products) are added. *  Please carry medication information at all times in case of emergency situations.     These are general instructions for a healthy lifestyle:    No smoking/ No tobacco products/ Avoid exposure to second hand smoke  Surgeon General's Warning:  Quitting smoking now greatly reduces serious risk to your health. Obesity, smoking, and sedentary lifestyle greatly increases your risk for illness    A healthy diet, regular physical exercise & weight monitoring are important for maintaining a healthy lifestyle    You may be retaining fluid if you have a history of heart failure or if you experience any of the following symptoms:  Weight gain of 3 pounds or more overnight or 5 pounds in a week, increased swelling in our hands or feet or shortness of breath while lying flat in bed. Please call your doctor as soon as you notice any of these symptoms; do not wait until your next office visit. Recognize signs and symptoms of STROKE:    F-face looks uneven    A-arms unable to move or move unevenly    S-speech slurred or non-existent    T-time-call 911 as soon as signs and symptoms begin-DO NOT go       Back to bed or wait to see if you get better-TIME IS BRAIN. Warning Signs of HEART ATTACK     Call 911 if you have these symptoms:   Chest discomfort. Most heart attacks involve discomfort in the center of the chest that lasts more than a few minutes, or that goes away and comes back. It can feel like uncomfortable pressure, squeezing, fullness, or pain.  Discomfort in other areas of the upper body. Symptoms can include pain or discomfort in one or both arms, the back, neck, jaw, or stomach.  Shortness of breath with or without chest discomfort.  Other signs may include breaking out in a cold sweat, nausea, or lightheadedness. Don't wait more than five minutes to call 911 - MINUTES MATTER! Fast action can save your life. Calling 911 is almost always the fastest way to get lifesaving treatment. Emergency Medical Services staff can begin treatment when they arrive -- up to an hour sooner than if someone gets to the hospital by car. The discharge information has been reviewed with the patient and mother. The patient and mother verbalized understanding. Discharge medications reviewed with the patient and mother and appropriate educational materials and side effects teaching were provided. ___________________________________________________________________________________________________________________________________   Oxycodone/Acetaminophen (Percocet, Roxicet) - (By mouth)   Why this medicine is used:   Treats pain. This medicine contains a narcotic pain reliever. Contact a nurse or doctor right away if you have:  · Extreme weakness, shallow breathing, slow heartbeat  · Sweating or cold, clammy skin  · Skin blisters, rash, or peeling     Common side effects:  · Constipation  · Nausea, vomiting  · Tiredness  © 2017 [address] Information is for End User's use only and may not be sold, redistributed or otherwise used for commercial purposes.

## 2018-07-16 NOTE — ANESTHESIA PREPROCEDURE EVALUATION
Anesthetic History   No history of anesthetic complications            Review of Systems / Medical History  Patient summary reviewed and pertinent labs reviewed    Pulmonary        Sleep apnea  Smoker  Asthma        Neuro/Psych         Psychiatric history     Cardiovascular    Hypertension          Past MI and CAD         GI/Hepatic/Renal     GERD           Endo/Other    Diabetes: type 2    Arthritis     Other Findings   Comments: Documentation of current medication  Current medications obtained, documented and obtained? YES      Risk Factors for Postoperative nausea/vomiting:       History of postoperative nausea/vomiting? NO       Female? NO       Motion sickness? NO       Intended opioid administration for postoperative analgesia? YES      Smoking Abstinence:  Current Smoker? YES  Elective Surgery? YES  Seen preoperatively by anesthesiologist or proxy prior to day of surgery? YES  Pt abstained from smoking 24 hours prior to anesthesia? NO    Preventive care/screening for High Blood Pressure:  Aged 18 years and older: YES  Screened for high blood pressure: YES  Patients with high blood pressure referred to primary care provider   for BP management: YES                 Physical Exam    Airway  Mallampati: III  TM Distance: 4 - 6 cm  Neck ROM: normal range of motion   Mouth opening: Normal     Cardiovascular    Rhythm: regular  Rate: normal         Dental    Dentition: Poor dentition and Caps/crowns  Comments:  Many gold teeth   Pulmonary  Breath sounds clear to auscultation               Abdominal  GI exam deferred       Other Findings            Anesthetic Plan    ASA: 3  Anesthesia type: general          Induction: Intravenous  Anesthetic plan and risks discussed with: Patient

## 2018-07-16 NOTE — ANESTHESIA POSTPROCEDURE EVALUATION
Post-Anesthesia Evaluation and Assessment    Patient: Kayla Bolivar MRN: [id number]  SSN: [id number]    YOB: 1970  Age: 51 y.o. Sex: male     VS from flow sheet    Cardiovascular Function/Vital Signs  Visit Vitals    /80 (BP 1 Location: Right arm, BP Patient Position: At rest)    Pulse (!) 59    Temp 36.4 °C (97.5 °F)    Resp 14    Ht 5' 9\" (1.753 m)    Wt 98 kg (216 lb)    SpO2 94%    BMI 31.9 kg/m2       Patient is status post general anesthesia for Procedure(s):  RIGHT KNEE ARTHROSCOPIC PARTIAL MEDIAL MENISECTOMY. Nausea/Vomiting: None    Postoperative hydration reviewed and adequate. Pain:  Pain Scale 1: Numeric (0 - 10) (07/16/18 1324)  Pain Intensity 1: 4 (07/16/18 1324)   Managed    Neurological Status:   Neuro (WDL): Within Defined Limits (07/16/18 1151)   At baseline    Mental Status and Level of Consciousness: Arousable    Pulmonary Status:   O2 Device: Room air (07/16/18 1324)   Adequate oxygenation and airway patent    Complications related to anesthesia: None    Post-anesthesia assessment completed.  No concerns    Signed By: Rudy Dennis MD     July 16, 2018

## 2018-07-16 NOTE — OP NOTES
[facility] REPORT    Name:CHRISTOS SANDS  MR#: [id number]  : 1970  ACCOUNT #: [de-identified]   DATE OF SERVICE: 2018    PREOPERATIVE DIAGNOSIS:  Medial meniscus tear of the right knee. 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 PROCEDURES PERFORMED:  Arthroscopy of the right knee with partial meniscectomy as well as a chondroplasty and articular flap smoothing. SURGEON:  Roseline Dave MD     COMPLICATIONS:  No complications. SPECIMENS REMOVED:  No specimen. ESTIMATED BLOOD LOSS:  3 mL. ASSISTANT:  Saran Griffin Phaabbi    ANESTHETIST:  Dr. Kimberley Tinoco:  General.    IMPLANTS:  n/a      DESCRIPTION OF PROCEDURE:  After anesthetic was successfully induced, it was confirmed he received an antibiotic and a standard prep and drape and a timeout. I injected the knee with injectable normal saline initially, placed our outflow cannula proximal medially, prepared our lateral portal and dilated with a blunt obturator. Introduced the scope initially into the patellofemoral articulation, which was documented with some grade III changes. We went around in a horseshoe pattern down the gutters and looking in each compartment, the lateral compartment had grade I-II changes and intact meniscus probed and over into the medial compartment showing a posterior medial meniscus tear and areas of grade III chondromalacia mainly on the femoral side, a little bit on the tibial side as well, 1 small area of IV as well. We then made our portal of entry for medial, used the dilator. Introduced the upbite and a combination of upbite and also right side-biting, trimmed the meniscus back to stable margins. Then used the 3.5 shaver to clean this up, the  meniscus a little bit further.   We then cleaned up the articular flap tears on the femur with the 3.5 shaver as well and liberally irrigated out.  Had another look around. A little bit further work in the patellofemoral articulation as well with the shaver. We then liberally irrigated out the knee, withdrew the irrigating solution from the knee and then injected the Marcaine into the knee itself through the outflow cannula. We withdrew all the cannulas and a routine closure, sterile dressing. No complications. Patient tolerated the procedure well. The patient was awoken and taken to the recovery room in stable condition.       Hortensia Serna MD AM / SHANDRA  D: 07/16/2018 11:38     T: 07/16/2018 12:47  JOB #: [id number]

## 2018-07-16 NOTE — IP AVS SNAPSHOT
Chana Michael 
 
 
 [address] [facility] 61 [facility] Patient: Erasmo Najera MRN: [id number] YMY:[id number] About your hospitalization You were admitted on:  July 16, 2018 You last received care in the:  [facility] PHASE 2 RECOVERY You were discharged on:  July 16, 2018 Why you were hospitalized Your primary diagnosis was:  Not on File Follow-up Information Follow up With Details Comments Contact Info Stanley Adhikari MD   [address] [facility] [address] [id number] 
[phone] Byron Braun MD Follow up in 10 day(s)  [employer] [address] [facility] [address] 
[phone] Your Scheduled Appointments Thursday August 02, 2018  9:45 AM EDT  
POST OP with Beatriz Leal PA-C  
[facility] [place] [address] [place] [address]) [address] [facility] [address] [facility] [address]  
[phone] Thursday August 02, 2018  2:00 PM EDT Office Visit with Michelle Fall MD  
[facility]) [address] [address]  
[phone] Friday August 17, 2018 10:00 AM EDT PROCEDURE with [facility] [place] [facility] ([facility]) [address], [address] [facility] [id number] [phone] Wednesday August 22, 2018 11:00 AM EDT Office Visit with Dontae Atwood MD  
[facility]) [address], [address] [facility] [id number] [phone] Discharge Orders None A check sadi indicates which time of day the medication should be taken. My Medications START taking these medications Instructions Each Dose to Equal  
 Morning Noon Evening Bedtime  
 oxyCODONE-acetaminophen 5-325 mg per tablet Commonly known as:  PERCOCET Your last dose was: Your next dose is: 1-2 po q 4-6h prn pain CONTINUE taking these medications Instructions Each Dose to Equal  
 Morning Noon Evening Bedtime ADVAIR DISKUS 500-50 mcg/dose diskus inhaler Generic drug:  fluticasone-salmeterol Your last dose was: Your next dose is: Take 1 Puff by inhalation every twelve (12) hours. 1 Puff  
    
   
   
   
  
 albuterol 90 mcg/actuation inhaler Commonly known as:  PROVENTIL HFA, VENTOLIN HFA, PROAIR HFA Your last dose was: Your next dose is:    
   
   
 1-2 Puffs. 1-2 Puff ASPIRIN PO Your last dose was: Your next dose is: Take 81 mg by mouth. 81 mg Blood-Glucose Meter monitoring kit Your last dose was: Your next dose is:    
   
   
 Check fasting sugars daily before breakfast  
     
   
   
   
  
 cetirizine-psuedoePHEDrine 5-120 mg per tablet Commonly known as:  ZyrTEC-D Your last dose was: Your next dose is: Take 1 Tab by mouth two (2) times a day. 1 Tab  
    
   
   
   
  
 cyclobenzaprine 10 mg tablet Commonly known as:  FLEXERIL Your last dose was: Your next dose is: Take one po qhs prn muscle spasms  
     
   
   
   
  
 docusate sodium 100 mg capsule Commonly known as:  Annra Shreyas Your last dose was: Your next dose is: Take one po daily prn constipation DULoxetine 30 mg capsule Commonly known as:  CYMBALTA Your last dose was: Your next dose is: TAKE 1 CAPSULE BY MOUTH EVERY MORNING  
     
   
   
   
  
 FISH OIL 60- mg Cap Generic drug:  omega 3-dha-epa-fish oil Your last dose was: Your next dose is: Take 500 mg by mouth daily. 500 mg  
    
   
   
   
  
 fluticasone 50 mcg/actuation nasal spray Commonly known as:  Alena Escobedo Your last dose was: Your next dose is: 2 Sprays by Both Nostrils route daily. 2 Spray  
    
   
   
   
  
 glipiZIDE 10 mg tablet Commonly known as:  Anamaria Noonanb Your last dose was: Your next dose is: Take 1 Tab by mouth two (2) times a day. 10 mg  
    
   
   
   
  
 glucose blood VI test strips strip Commonly known as:  ASCENSIA AUTODISC VI, ONE TOUCH ULTRA TEST VI Your last dose was: Your next dose is:    
   
   
 Check fasting sugars daily before breakfast  
     
   
   
   
  
 Lancets Misc Your last dose was: Your next dose is:    
   
   
 Check fasting sugars daily before breakfast  
     
   
   
   
  
 lidocaine 5 % ointment Commonly known as:  XYLOCAINE Your last dose was: Your next dose is:    
   
   
 APPLY THIN FILM RUB EXTERNALLY TWICE DAILY IN THE MORNING AND EVENING  
     
   
   
   
  
 lisinopril 20 mg tablet Commonly known as:  Jacques Brooks Your last dose was: Your next dose is: Take 1 Tab by mouth daily. 20 mg  
    
   
   
   
  
 loratadine-pseudoephedrine  mg per tablet Commonly known as:  CLARITIN-D 24-hour Your last dose was: Your next dose is: Take 1 Tab by Mouth Once a Day. metFORMIN  mg tablet Commonly known as:  GLUCOPHAGE XR Your last dose was: Your next dose is: Take 2 Tabs by mouth two (2) times a day. 1000 mg  
    
   
   
   
  
 metoprolol succinate 25 mg XL tablet Commonly known as:  TOPROL-XL Your last dose was: Your next dose is: Take 1 Tab by mouth daily. 25 mg  
    
   
   
   
  
 montelukast 10 mg tablet Commonly known as:  SINGULAIR Your last dose was: Your next dose is: Take 10 mg by mouth daily. 10 mg NITROSTAT 0.4 mg SL tablet Generic drug:  nitroglycerin Your last dose was: Your next dose is: 0.4 mg by SubLINGual route every five (5) minutes as needed for Chest Pain. 0.4 mg  
    
   
   
   
  
 omeprazole 40 mg capsule Commonly known as:  PRILOSEC Your last dose was: Your next dose is:    
   
   
 as needed PARoxetine 20 mg tablet Commonly known as:  PAXIL Your last dose was: Your next dose is: Take 20 mg by mouth daily. 20 mg  
    
   
   
   
  
 pravastatin 20 mg tablet Commonly known as:  PRAVACHOL Your last dose was: Your next dose is: Take 20 mg by mouth nightly. 20 mg  
    
   
   
   
  
 QUEtiapine 100 mg tablet Commonly known as:  SEROquel Your last dose was: Your next dose is: Take 100 mg by mouth nightly. 100 mg  
    
   
   
   
  
 tiotropium 18 mcg inhalation capsule Commonly known as:  Layo Dash Your last dose was: Your next dose is: Take 1 Cap by inhalation daily. 1 Cap Where to Get Your Medications Information on where to get these meds will be given to you by the nurse or doctor. ! Ask your nurse or doctor about these medications  
  oxyCODONE-acetaminophen 5-325 mg per tablet Opioid Education Prescription Opioids: What You Need to Know: 
 
Prescription opioids can be used to help relieve moderate-to-severe pain and are often prescribed following a surgery or injury, or for certain health conditions. These medications can be an important part of treatment but also come with serious risks. Opioids are strong pain medicines. Examples include hydrocodone, oxycodone, fentanyl, and morphine. Heroin is an example of an illegal opioid. It is important to work with your health care provider to make sure you are getting the safest, most effective care. WHAT ARE THE RISKS AND SIDE EFFECTS OF OPIOID USE? Prescription opioids carry serious risks of addiction and overdose, especially with prolonged use. An opioid overdose, often marked by slow breathing, can cause sudden death. The use of prescription opioids can have a number of side effects as well, even when taken as directed. · Tolerance-meaning you might need to take more of a medication for the same pain relief · Physical dependence-meaning you have symptoms of withdrawal when the medication is stopped. Withdrawal symptoms can include nausea, sweating, chills, diarrhea, stomach cramps, and muscle aches. Withdrawal can last up to several weeks, depending on which drug you took and how long you took it. · Increased sensitivity to pain · Constipation · Nausea, vomiting, and dry mouth · Sleepiness and dizziness · Confusion · Depression · Low levels of testosterone that can result in lower sex drive, energy, and strength · Itching and sweating RISKS ARE GREATER WITH:      
· History of drug misuse, substance use disorder, or overdose · Mental health conditions (such as depression or anxiety) · Sleep apnea · Older age (72 years or older) · Pregnancy Avoid alcohol while taking prescription opioids. Also, unless specifically advised by your health care provider, medications to avoid include: · Benzodiazepines (such as Xanax or Valium) · Muscle relaxants (such as Soma or Flexeril) · Hypnotics (such as Ambien or Lunesta) · Other prescription opioids KNOW YOUR OPTIONS Talk to your health care provider about ways to manage your pain that don't involve prescription opioids. Some of these options may actually work better and have fewer risks and side effects. Options may include: 
· Pain relievers such as acetaminophen, ibuprofen, and naproxen · Some medications that are also used for depression or seizures · Physical therapy and exercise · Counseling to help patients learn how to cope better with triggers of pain and stress. · Application of heat or cold compress · Massage therapy · Relaxation techniques Be Informed Make sure you know the name of your medication, how much and how often to take it, and its potential risks & side effects. IF YOU ARE PRESCRIBED OPIOIDS FOR PAIN: 
· Never take opioids in greater amounts or more often than prescribed. Remember the goal is not to be pain-free but to manage your pain at a tolerable level. · Follow up with your primary care provider to: · Work together to create a plan on how to manage your pain. · Talk about ways to help manage your pain that don't involve prescription opioids. · Talk about any and all concerns and side effects. · Help prevent misuse and abuse. · Never sell or share prescription opioids · Help prevent misuse and abuse. · Store prescription opioids in a secure place and out of reach of others (this may include visitors, children, friends, and family). · Safely dispose of unused/unwanted prescription opioids: Find your community drug take-back program or your pharmacy mail-back program, or flush them down the toilet, following guidance from the Food and Drug Administration (www.fda.gov/Drugs/ResourcesForYou). · Visit www.cdc.gov/drugoverdose to learn about the risks of opioid abuse and overdose. · If you believe you may be struggling with addiction, tell your health care provider and ask for guidance or call [address] [place], [place] [address]VOZ at 5-670-800-SUTX. Discharge Instructions Knee Arthroscopy: What to Expect at [facility] Your Recovery Arthroscopy is a way to find problems and do surgery inside a joint without making a large cut (incision). Your doctor put a lighted tube with a tiny camera-called an arthroscope, or scope-and surgical tools through small incisions in your knee. You will feel tired for several days. Your knee will be swollen, and you may notice that your skin is a different color near the cuts (incisions). The swelling is normal and will start to go away in a few days. Keeping your leg higher than your heart will help with swelling and pain. You will probably need about 6 weeks to recover. If your doctor repaired damaged tissue, recovery will take longer. You may have to limit your activity until your knee strength and movement return to normal. You may also be in a physical rehabilitation (rehab) program. 
You may be able to return to a desk job or your normal routine in a few days. But if you do physical labor, it may be as long as 2 months before you can return to work. This care sheet gives you a general idea about how long it will take for you to recover. But each person recovers at a different pace. Follow the steps below to get better as quickly as possible. How can you care for yourself at home? Activity 
  · Rest when you feel tired. Getting enough sleep will help you recover. Use pillows to raise your ankle and leg above the level of your heart.  
  · Try to walk each day, after your doctor has said you can. Start by walking a little more than you did the day before. Bit by bit, increase the amount you walk. Walking boosts blood flow and helps prevent pneumonia and constipation.  
  · You may have a brace or crutches or both.  
  · Your doctor will tell you how often and how much you can move your leg and knee.  
  · If you have a desk job, you may be able to return to work a few days after the surgery. If you lift things or stand or walk a lot at work, it may be as long as 2 months before you can return.  
  · You can take a shower 48 to 72 hours after surgery and clean the incisions with regular soap and water. Do not take a bath or soak your knee until your doctor says it is okay.  
  · Ask your doctor when you can drive again.   · If you had a repair of torn tissue, follow your doctor's instructions for lifting things or moving your knee. Diet 
  · You can eat your normal diet. If your stomach is upset, try bland, low-fat foods like plain rice, broiled chicken, toast, and yogurt.  
  · Drink plenty of fluids, unless your doctor tells you not to.  
  · You may notice that your bowel movements are not regular right after your surgery. This is common. Try to avoid constipation and straining with bowel movements. You may want to take a fiber supplement every day. If you have not had a bowel movement after a couple of days, ask your doctor about taking a mild laxative. Medicines 
  · Your doctor will tell you if and when you can restart your medicines. He or she will also give you instructions about taking any new medicines.  
  · If you take blood thinners, such as warfarin (Coumadin), clopidogrel (Plavix), or aspirin, be sure to talk to your doctor. He or she will tell you if and when to start taking those medicines again. Make sure that you understand exactly what your doctor wants you to do.  
  · Be safe with medicines. Take pain medicines exactly as directed. ¨ If the doctor gave you a prescription medicine for pain, take it as prescribed. ¨ If you are not taking a prescription pain medicine, ask your doctor if you can take an over-the-counter medicine.  
  · If you think your pain medicine is making you sick to your stomach: 
¨ Take your medicine after meals (unless your doctor has told you not to). ¨ Ask your doctor for a different pain medicine.  
  · If your doctor prescribed antibiotics, take them as directed. Do not stop taking them just because you feel better. You need to take the full course of antibiotics. Incision care 
  · If you have a dressing over your cuts (incisions), keep it clean and dry. You may remove it 48 to 72 hours after the surgery.   · If your incisions are open to the air, keep the area clean and dry.  
  · If you have strips of tape on the incisions, leave the tape on for a week or until it falls off. Exercise 
  · Move your toes and ankle as much as your bandages will allow.  
  · Bend and straighten your knee slowly several times during the day.  
  · Depending on why you had the surgery, you may have to do ankle and leg exercises. Your doctor or physical therapist will give you exercises as part of a rehabilitation program.  
  · Stop any activity that causes sharp pain. Talk to your doctor or physical therapist about what sports or other exercise you can do. Ice and elevation 
  · To reduce swelling and pain, put ice or a cold pack on your knee for 10 to 20 minutes at a time. Do this every 1 to 2 hours. Put a thin cloth between the ice and your skin. Follow-up care is a key part of your treatment and safety. Be sure to make and go to all appointments, and call your doctor if you are having problems. It's also a good idea to know your test results and keep a list of the medicines you take. When should you call for help? Call 911 anytime you think you may need emergency care. For example, call if: 
  · You passed out (lost consciousness).  
  · You have severe trouble breathing.  
  · You have sudden chest pain and shortness of breath, or you cough up blood.  
 Call your doctor now or seek immediate medical care if: 
  · Your foot or toes are numb or tingling.  
  · Your foot is cool or pale, or it changes color.  
  · You have signs of a blood clot, such as: 
¨ Pain in your calf, back of the knee, thigh, or groin. ¨ Redness and swelling in your leg or groin.  
  · You are sick to your stomach or cannot keep fluids down.  
  · You have pain that does not get better after you take pain medicine.  
  · You have loose stitches, or your incision comes open.  
  · Bright red blood has soaked through the bandage over your incision.   · You have signs of infection, such as: 
¨ Increased pain, swelling, warmth, or redness. ¨ Red streaks leading from the incisions. ¨ Pus draining from the incisions. ¨ A fever.  
 Watch closely for any changes in your health, and be sure to contact your doctor if: 
  · You do not have a bowel movement after taking a laxative. Where can you learn more? Go to http://carlos a-fernando.info/. Enter H088 in the search box to learn more about \"Knee Arthroscopy: What to Expect at Home. \" Current as of: November 29, 2017 Content Version: 11.7 © 9759-8276 [employer]. Care instructions adapted under license by [employer] (which disclaims liability or warranty for this information). If you have questions about a medical condition or this instruction, always ask your healthcare professional. [address] any warranty or liability for your use of this information. DISCHARGE SUMMARY from Nurse PATIENT INSTRUCTIONS: 
 
After general anesthesia or intravenous sedation, for 24 hours or while taking prescription Narcotics: · Limit your activities · Do not drive and operate hazardous machinery · Do not make important personal or business decisions · Do  not drink alcoholic beverages · If you have not urinated within 8 hours after discharge, please contact your surgeon on call. *  Please give a list of your current medications to your Primary Care Provider. *  Please update this list whenever your medications are discontinued, doses are 
    changed, or new medications (including over-the-counter products) are added. *  Please carry medication information at all times in case of emergency situations. These are general instructions for a healthy lifestyle: No smoking/ No tobacco products/ Avoid exposure to second hand smoke Surgeon General's Warning:  Quitting smoking now greatly reduces serious risk to your health. Obesity, smoking, and sedentary lifestyle greatly increases your risk for illness A healthy diet, regular physical exercise & weight monitoring are important for maintaining a healthy lifestyle You may be retaining fluid if you have a history of heart failure or if you experience any of the following symptoms:  Weight gain of 3 pounds or more overnight or 5 pounds in a week, increased swelling in our hands or feet or shortness of breath while lying flat in bed. Please call your doctor as soon as you notice any of these symptoms; do not wait until your next office visit. Recognize signs and symptoms of STROKE: 
 
F-face looks uneven A-arms unable to move or move unevenly S-speech slurred or non-existent T-time-call 911 as soon as signs and symptoms begin-DO NOT go Back to bed or wait to see if you get better-TIME IS BRAIN. Warning Signs of HEART ATTACK Call 911 if you have these symptoms: 
? Chest discomfort. Most heart attacks involve discomfort in the center of the chest that lasts more than a few minutes, or that goes away and comes back. It can feel like uncomfortable pressure, squeezing, fullness, or pain. ? Discomfort in other areas of the upper body. Symptoms can include pain or discomfort in one or both arms, the back, neck, jaw, or stomach. ? Shortness of breath with or without chest discomfort. ? Other signs may include breaking out in a cold sweat, nausea, or lightheadedness. Don't wait more than five minutes to call [address]! Fast action can save your life. Calling 911 is almost always the fastest way to get lifesaving treatment. Emergency Medical Services staff can begin treatment when they arrive  up to an hour sooner than if someone gets to the hospital by car. The discharge information has been reviewed with the patient and mother. The patient and mother verbalized understanding. Discharge medications reviewed with the patient and mother and appropriate educational materials and side effects teaching were provided. ___________________________________________________________________________________________________________________________________ Oxycodone/Acetaminophen (Percocet, Roxicet) - (By mouth) Why this medicine is used:  
Treats pain. This medicine contains a narcotic pain reliever. Contact a nurse or doctor right away if you have: 
· Extreme weakness, shallow breathing, slow heartbeat · Sweating or cold, clammy skin · Skin blisters, rash, or peeling Common side effects: 
· Constipation · Nausea, vomiting · Tiredness © 2017 [facility] Information is for End User's use only and may not be sold, redistributed or otherwise used for commercial purposes. ACO Transitions of Care Introducing [employer] [address] Vania Aguiar offers a voluntary care coordination program to provide high quality service and care to [facility] fee-for-service beneficiaries. Roma Nolen was designed to help you enhance your health and well-being through the following services: ? Transitions of Care  support for individuals who are transitioning from one care setting to another (example: Hospital to home). ? Chronic and Complex Care Coordination  support for individuals and caregivers of those with serious or chronic illnesses or with more than one chronic (ongoing) condition and those who take a number of different medications. If you meet specific medical criteria, a [address] [place] [address] Rd may call you directly to coordinate your care with your primary care physician and your other care providers. For questions about the [facility] programs, please, contact your physicians office. For general questions or additional information about Accountable Care Organizations: Please visit www.medicare.gov/acos. html or call 1-800-MEDICARE (0-847.769.3059) TTY users should call 7-164.840.7839. Introducing [facility] & HEALTH SERVICES! Dear Luis Prado: Thank you for requesting a [employer] account. Our records indicate that you already have an active [employer] account. You can access your account anytime at [email or web address] Did you know that you can access your hospital and ER discharge instructions at any time in [employer]? You can also review all of your test results from your hospital stay or ER visit. Additional Information If you have questions, please visit the Frequently Asked Questions section of the [employer] website at [email or web address] Remember, [employer] is NOT to be used for urgent needs. For medical emergencies, dial 911. Now available from your iPhone and Android! Introducing Orlando Bassett As a [facility] patient, I wanted to make you aware of our electronic visit tool called Orlando Bassett. Kent Garcia [employer] [facility] 24/7 allows you to connect within minutes with a medical provider 24 hours a day, seven days a week via a mobile device or tablet or logging into a secure website from your computer. You can access Orlando Bassett from anywhere in the [place]. A virtual visit might be right for you when you have a simple condition and feel like you just dont want to get out of bed, or cant get away from work for an appointment, when your regular [facility] provider is not available (evenings, weekends or holidays), or when youre out of town and need minor care. Electronic visits cost only $49 and if the [facility] 24/7 provider determines a prescription is needed to treat your condition, one can be electronically transmitted to a nearby pharmacy*. Please take a moment to enroll today if you have not already done so. The enrollment process is free and takes just a few minutes.   To enroll, please download the Camilla Joberator 24/7 meg to your tablet or phone, or visit [email or web address] to enroll on your computer. And, as an [address] [place] [address] patient with a [employer] account, the results of your visits will be scanned into your electronic medical record and your primary care provider will be able to view the scanned results. We urge you to continue to see your regular Camilla Hwang provider for your ongoing medical care. And while your primary care provider may not be the one available when you seek a [facility] virtual visit, the peace of mind you get from getting a real diagnosis real time can be priceless. For more information on [facility], view our Frequently Asked Questions (FAQs) at [email or web address]. Sincerely, 
 
Mirna Glaser MD 
Chief Medical Officer [employer] *:  certain medications cannot be prescribed via [facility] Providers Seen During Your Hospitalization Provider Specialty Primary office phone Ayanna Llano, [facility] Orthopedic Surgery [phone] Your Primary Care Physician (PCP) Primary Care Physician Office Phone Office Fax [address] [phone] You are allergic to the following Allergen Reactions Iodine Anaphylaxis Norco (Hydrocodone-Acetaminophen) Nausea and Vomiting Gi distress Shellfish Derived Swelling Angioedema Other reaction(s): anaphylaxis/angioedema [address] AND CRAB Recent Documentation Height Weight BMI Smoking Status 1.753 m 98 kg 31.9 kg/m2 Current Every Day Smoker Emergency Contacts Name Discharge Info Relation Home Work Mobile [facility] CAREGIVER [3] Girlfriend [18] [phone] Patient Belongings  The following personal items are in your possession at time of discharge: 
  Dental Appliances: None  Visual Aid: None      Home Medications: None Jewelry: Samira (will go with security )  Clothing: Hat (beanie hat )    Other Valuables: Cell Phone, Eyeglasses, Money (comment), Wallet (wallet, neckkace , blue tooth, watch will be locked up by secuity ) Please provide this summary of care documentation to your next provider. Signatures-by signing, you are acknowledging that this After Visit Summary has been reviewed with you and you have received a copy. Patient Signature:  ____________________________________________________________ Date:  ____________________________________________________________  
  
[facility] Provider Signature:  ____________________________________________________________ Date:  ____________________________________________________________

## 2018-08-02 ENCOUNTER — OFFICE VISIT (OUTPATIENT)
Dept: ORTHOPEDIC SURGERY | Facility: CLINIC | Age: 48
End: 2018-08-02

## 2018-08-02 VITALS
DIASTOLIC BLOOD PRESSURE: 87 MMHG | SYSTOLIC BLOOD PRESSURE: 134 MMHG | HEART RATE: 70 BPM | BODY MASS INDEX: 31.96 KG/M2 | TEMPERATURE: 98.4 F | HEIGHT: 69 IN | RESPIRATION RATE: 16 BRPM | WEIGHT: 215.8 LBS

## 2018-08-02 DIAGNOSIS — Z98.890 S/P SURGICAL MANIPULATION OF KNEE JOINT: ICD-10-CM

## 2018-08-02 DIAGNOSIS — M17.31 POST-TRAUMATIC OSTEOARTHRITIS OF RIGHT KNEE: ICD-10-CM

## 2018-08-02 RX ORDER — OXYCODONE AND ACETAMINOPHEN 5; 325 MG/1; MG/1
TABLET ORAL
Qty: 35 TAB | Refills: 0 | Status: CANCELLED | OUTPATIENT
Start: 2018-08-02

## 2018-08-02 RX ORDER — HYDROCODONE BITARTRATE AND ACETAMINOPHEN 7.5; 325 MG/1; MG/1
1 TABLET ORAL
Qty: 42 TAB | Refills: 0 | Status: SHIPPED | OUTPATIENT
Start: 2018-08-02 | End: 2018-08-02

## 2018-08-02 RX ORDER — OXYCODONE AND ACETAMINOPHEN 5; 325 MG/1; MG/1
1 TABLET ORAL
Qty: 42 TAB | Refills: 0 | Status: SHIPPED | OUTPATIENT
Start: 2018-08-02 | End: 2018-10-25

## 2018-08-02 NOTE — PROGRESS NOTES
[address] [place] [address], [address] [place] [address]  [phone]           Patient: Luiz Flores                MRN: [id number]       SSN: [id number]  YOB: 1970        AGE: 51 y.o. SEX: male  Body mass index is 31.87 kg/(m^2). PCP: Tiana Javier MD  08/02/18      This office note has been dictated. REVIEW OF SYSTEMS:  Constitutional: Negative for fever, chills, weight loss and malaise/fatigue. HENT: Negative. Eyes: Negative. Respiratory: Negative. Cardiovascular: Negative. Gastrointestinal: No bowel incontinence or constipation. Genitourinary: No bladder incontinence or saddle anesthesia. Skin: Negative. Neurological: Negative. Endo/Heme/Allergies: Negative. Psychiatric/Behavioral: Negative. Musculoskeletal: As per HPI above. Past Medical History:   Diagnosis Date    Abnormal EKG 7/9/2018    Chronic ST-T changes noted likely from LVH. Prior cardiac workup includes stress test and cardiac catheterization. Reviewed    Advance directive discussed with patient 05/03/2017    Arthritis     left knee    Asthma     CAD (coronary artery disease)     heart attack 6/2012, Being followed by Dr. Marc Shah Cocaine use 2017    Depression     Diabetes ([facility] [address]     DVT (deep venous thrombosis) (HCC)     left leg in the past (Pt was on coumadin)    GERD (gastroesophageal reflux disease)     Hyperlipidemia 7/9/2018    cramps with pravastatin. Consider alternate therapy after surgery    Hypertension     Hypertensive heart disease without heart failure 7/9/2018    Blood pressure control. LVH on EKG.  Hypertensive left ventricular hypertrophy, without heart failure 7/9/2018    Mild LVH on last echo.  Medication refill 7/9/2018    Pre-operative cardiovascular examination 7/9/2018    Knee surgery. Stable cardiac status.   Okay to proceed with moderate cardiac risk    Psychiatric disorder     depression and sarahi    Schizophrenia ([facility] [address]     Will be seeing Fortuna Vini    Type 2 diabetes mellitus without complication, without long-term current use of insulin ([facility] 7/9/2018    On treatment    Unspecified sleep apnea     cpap machine,Will be seeing neurologist         Current Outpatient Prescriptions:     HYDROcodone-acetaminophen (NORCO) 7.5-325 mg per tablet, Take 1 Tab by mouth every eight (8) hours as needed for Pain. Max Daily Amount: 3 Tabs., Disp: 42 Tab, Rfl: 0    glucose blood VI test strips (ASCENSIA AUTODISC VI, ONE TOUCH ULTRA TEST VI) strip, Check fasting sugars daily before breakfast, Disp: 100 Strip, Rfl: 11    Lancets misc, Check fasting sugars daily before breakfast, Disp: 100 Each, Rfl: 11    Blood-Glucose Meter monitoring kit, Check fasting sugars daily before breakfast, Disp: 1 Kit, Rfl: 0    metFORMIN ER (GLUCOPHAGE XR) 500 mg tablet, Take 2 Tabs by mouth two (2) times a day., Disp: 360 Tab, Rfl: 3    metoprolol succinate (TOPROL-XL) 25 mg XL tablet, Take 1 Tab by mouth daily. , Disp: 90 Tab, Rfl: 1    lisinopril (PRINIVIL, ZESTRIL) 20 mg tablet, Take 1 Tab by mouth daily. , Disp: 90 Tab, Rfl: 1    fluticasone (FLONASE) 50 mcg/actuation nasal spray, 2 Sprays by Both Nostrils route daily. , Disp: 1 Bottle, Rfl: 0    glipiZIDE (GLUCOTROL) 10 mg tablet, Take 1 Tab by mouth two (2) times a day., Disp: 60 Tab, Rfl: 2    PARoxetine (PAXIL) 20 mg tablet, Take 20 mg by mouth daily. , Disp: , Rfl:     QUEtiapine (SEROQUEL) 100 mg tablet, Take 100 mg by mouth nightly., Disp: , Rfl:     cetirizine-psuedoePHEDrine (ZYRTEC-D) 5-120 mg per tablet, Take 1 Tab by mouth two (2) times a day., Disp: 24 Tab, Rfl: 2    docusate sodium (COLACE) 100 mg capsule, Take one po daily prn constipation, Disp: 90 Cap, Rfl: 3    omeprazole (PRILOSEC) 40 mg capsule, as needed, Disp: , Rfl: 10    albuterol (PROVENTIL HFA, VENTOLIN HFA, PROAIR HFA) 90 mcg/actuation inhaler, 1-2 Puffs., Disp: , Rfl:     loratadine-pseudoephedrine (CLARITIN-D 24-HOUR)  mg per tablet, Take 1 Tab by Mouth Once a Day. , Disp: , Rfl:     omega 3-dha-epa-fish oil (FISH OIL) 60- mg cap, Take 500 mg by mouth daily. , Disp: , Rfl:     pravastatin (PRAVACHOL) 20 mg tablet, Take 20 mg by mouth nightly., Disp: , Rfl:     tiotropium (SPIRIVA) 18 mcg inhalation capsule, Take 1 Cap by inhalation daily. , Disp: , Rfl:     ASPIRIN PO, Take 81 mg by mouth., Disp: , Rfl:     montelukast (SINGULAIR) 10 mg tablet, Take 10 mg by mouth daily. , Disp: , Rfl:     fluticasone-salmeterol (ADVAIR DISKUS) 500-50 mcg/dose diskus inhaler, Take 1 Puff by inhalation every twelve (12) hours. , Disp: , Rfl:     nitroglycerin (NITROSTAT) 0.4 mg SL tablet, 0.4 mg by SubLINGual route every five (5) minutes as needed for Chest Pain., Disp: , Rfl:     oxyCODONE-acetaminophen (PERCOCET) 5-325 mg per tablet, 1-2 po q 4-6h prn pain, Disp: 35 Tab, Rfl: 0    cyclobenzaprine (FLEXERIL) 10 mg tablet, Take one po qhs prn muscle spasms, Disp: 15 Tab, Rfl: 0    DULoxetine (CYMBALTA) 30 mg capsule, TAKE 1 CAPSULE BY MOUTH EVERY MORNING, Disp: 30 Cap, Rfl: 0    lidocaine (XYLOCAINE) 5 % ointment, APPLY THIN FILM RUB EXTERNALLY TWICE DAILY IN THE MORNING AND EVENING, Disp: , Rfl: 3    Allergies   Allergen Reactions    Iodine Anaphylaxis    Norco [Hydrocodone-Acetaminophen] Nausea and Vomiting     Gi distress    Shellfish Derived Swelling and Angioedema     Other reaction(s): anaphylaxis/angioedema  SHRIMP AND CRAB       Social History     Social History    Marital status: SINGLE     Spouse name: N/A    Number of children: N/A    Years of education: N/A     Occupational History    Not on file.      Social History Main Topics    Smoking status: Current Every Day Smoker     Packs/day: 0.25     Types: Cigars    Smokeless tobacco: Never Used    Alcohol use Yes      Comment: \"a little\"/ occ    Drug use: Yes     Special: Marijuana      Comment: pt denies but pos for cocaine in 2017 and pos for marijuana in 2018    Sexual activity: Yes     Partners: Female     Other Topics Concern    Not on file     Social History Narrative       Past Surgical History:   Procedure Laterality Date    ABDOMEN SURGERY PROC UNLISTED      HX COLONOSCOPY      HX HEART CATHETERIZATION  2014    cardiac cath - normal coronaries    HX HERNIA REPAIR      HX KNEE REPLACEMENT      left TKR    HX ORTHOPAEDIC      L knee 1987, 1996    HX OTHER SURGICAL      eye sx at 2 yrs old               We did see Mr. Giuliana Barrett for followup with regards to his right knee. He had a partial medial meniscectomy. He is now two weeks out and doing well. There is still a little soreness in the knee, which is improving. He is requesting a refill of his pain medicine. He has had no fevers, chills, systemic changes, or injuries to report. PHYSICAL EXAMINATION:  In general, the patient is alert and oriented x 3 in no acute distress. The patient is well-developed, well-nourished, with a normal affect. The patient is afebrile. Examination of the right knee reveals the skin is intact. The surgical wound is healed nicely. The portals are clean and dry. Range of motion is well-preserved. He has full extension and knee flexion to 115°. The patella tracks nicely. There is a little crepitus anteriorly with range of motion activities noted. ASSESSMENT:  Status post right knee scope. PLAN:  at this point, the patient is doing well. He will continue activities as tolerated. He was given a refill of his pain medicine. We will see him in about three or four weeks time for evaluation. He will call with any questions or concerns that shall arise.               JR Prudencio GARCIA, PA-C, ATC

## 2018-08-22 ENCOUNTER — OFFICE VISIT (OUTPATIENT)
Dept: CARDIOLOGY CLINIC | Age: 48
End: 2018-08-22

## 2018-08-22 VITALS
HEART RATE: 80 BPM | BODY MASS INDEX: 31.4 KG/M2 | DIASTOLIC BLOOD PRESSURE: 75 MMHG | SYSTOLIC BLOOD PRESSURE: 135 MMHG | HEIGHT: 69 IN | WEIGHT: 212 LBS

## 2018-08-22 DIAGNOSIS — I25.10 CORONARY ARTERY DISEASE INVOLVING NATIVE CORONARY ARTERY OF NATIVE HEART WITHOUT ANGINA PECTORIS: ICD-10-CM

## 2018-08-22 DIAGNOSIS — R94.31 ABNORMAL EKG: ICD-10-CM

## 2018-08-22 DIAGNOSIS — I11.9 HYPERTENSIVE LEFT VENTRICULAR HYPERTROPHY, WITHOUT HEART FAILURE: Primary | ICD-10-CM

## 2018-08-22 NOTE — PROGRESS NOTES
HISTORY OF PRESENT ILLNESS  Dalton Izaguirre is a 51 y.o. male. HPI Comments: Patient is here for preoperative cardiac assessment. He had abnormal EKG suggestive of ischemia. Patient is asymptomatic with no complaint of chest pain,  dizziness, palpitation, edema, or other associated symptoms. Patient reports complaint of shortness of breath on exertion. Denies any orthopnea PND or peripheral edema    Hypertension   The history is provided by the patient. This is a chronic problem. The problem occurs constantly. The problem has not changed since onset. Pertinent negatives include no chest pain, no abdominal pain, no headaches and no shortness of breath. Abnormal Cardiac Test   The history is provided by the patient. This is a new problem. The problem occurs constantly. The problem has not changed since onset. Pertinent negatives include no chest pain, no abdominal pain, no headaches and no shortness of breath. Nothing aggravates the symptoms. Nothing relieves the symptoms. Review of Systems   Constitutional: Negative for chills and fever. HENT: Negative for nosebleeds. Eyes: Negative for blurred vision and double vision. Respiratory: Negative for cough, hemoptysis, sputum production, shortness of breath and wheezing. Cardiovascular: Negative for chest pain, palpitations, orthopnea, claudication, leg swelling and PND. Gastrointestinal: Negative for abdominal pain, heartburn, nausea and vomiting. Musculoskeletal: Negative for myalgias. Skin: Negative for rash. Neurological: Negative for dizziness, weakness and headaches. Endo/Heme/Allergies: Does not bruise/bleed easily. Family History   Problem Relation Age of Onset    Anemia Mother     Cancer Father      prostate    Cancer Brother     Diabetes Brother        Past Medical History:   Diagnosis Date    Abnormal EKG 7/9/2018    Chronic ST-T changes noted likely from LVH.   Prior cardiac workup includes stress test and cardiac catheterization. Reviewed    Advance directive discussed with patient 05/03/2017    Arthritis     left knee    Asthma     CAD (coronary artery disease)     heart attack 6/2012, Being followed by Dr. Emy Hearn Cocaine use 2017    Depression     Diabetes ([facility] [address]     [facility]) ([facility]     left leg in the past (Pt was on coumadin)    GERD (gastroesophageal reflux disease)     Hyperlipidemia 7/9/2018    cramps with pravastatin. Consider alternate therapy after surgery    Hypertension     Hypertensive heart disease without heart failure 7/9/2018    Blood pressure control. LVH on EKG.  Hypertensive left ventricular hypertrophy, without heart failure 7/9/2018    Mild LVH on last echo.  Medication refill 7/9/2018    Pre-operative cardiovascular examination 7/9/2018    Knee surgery. Stable cardiac status.   Okay to proceed with moderate cardiac risk    Psychiatric disorder     depression and schziophenia    Schizophrenia ([facility] [address]     Will be seeing [email or web address]    Type 2 diabetes mellitus without complication, without long-term current use of insulin ([facility] [address] 7/9/2018    On treatment    Unspecified sleep apnea     cpap machine,Will be seeing neurologist       Past Surgical History:   Procedure Laterality Date    ABDOMEN SURGERY [address] UNLISTED      HX COLONOSCOPY      HX HEART CATHETERIZATION  2014    cardiac cath - normal coronaries    HX HERNIA REPAIR      HX KNEE REPLACEMENT      left TKR    HX ORTHOPAEDIC      L knee 1987, 1996    HX OTHER SURGICAL      eye sx at 2 yrs old       Social History   Substance Use Topics    Smoking status: Current Every Day Smoker     Packs/day: 0.25     Types: Cigars    Smokeless tobacco: Never Used    Alcohol use Yes      Comment: \"a little\"/ occ       Allergies   Allergen Reactions    Iodine Anaphylaxis    Norco [Hydrocodone-Acetaminophen] Nausea and Vomiting     Gi distress    Shellfish Derived Swelling and Angioedema Other reaction(s): anaphylaxis/angioedema  SHRIMP AND CRAB       Prior to Admission medications    Medication Sig Start Date End Date Taking? Authorizing Provider   glucose blood VI test strips (ASCENSIA AUTODISC VI, ONE TOUCH ULTRA TEST VI) strip Check fasting sugars daily before breakfast 7/11/18  Yes Tony Howard MD   Lancets misc Check fasting sugars daily before breakfast 7/11/18  Yes Tony Howard MD   Blood-Glucose Meter monitoring kit Check fasting sugars daily before breakfast 7/11/18  Yes Tony Howard MD   metFORMIN ER (GLUCOPHAGE XR) 500 mg tablet Take 2 Tabs by mouth two (2) times a day. 7/11/18  Yes Tony Howard MD   metoprolol succinate (TOPROL-XL) 25 mg XL tablet Take 1 Tab by mouth daily. 7/9/18  Yes Yazmin Garcia MD   lisinopril (PRINIVIL, ZESTRIL) 20 mg tablet Take 1 Tab by mouth daily. 7/9/18  Yes Yazmin Garcia MD   fluticasone (FLONASE) 50 mcg/actuation nasal spray 2 Sprays by Both Nostrils route daily. 6/28/18  Yes CHIKI Page   glipiZIDE (GLUCOTROL) 10 mg tablet Take 1 Tab by mouth two (2) times a day. 5/3/18  Yes Lizzie Erazo MD   PARoxetine (PAXIL) 20 mg tablet Take 20 mg by mouth daily. 4/30/18  Yes Historical Provider   QUEtiapine (SEROQUEL) 100 mg tablet Take 100 mg by mouth nightly. 4/30/18  Yes Historical Provider   cyclobenzaprine (FLEXERIL) 10 mg tablet Take one po qhs prn muscle spasms 5/2/18  Yes Lizzie Erazo MD   docusate sodium (COLACE) 100 mg capsule Take one po daily prn constipation 6/15/17  Yes Tony Howard MD   omeprazole (PRILOSEC) 40 mg capsule as needed 12/28/16  Yes Historical Provider   albuterol (PROVENTIL HFA, VENTOLIN HFA, PROAIR HFA) 90 mcg/actuation inhaler 1-2 Puffs. 10/12/16  Yes Historical Provider   loratadine-pseudoephedrine (CLARITIN-D 24-HOUR)  mg per tablet Take 1 Tab by Mouth Once a Day. Yes Historical Provider   pravastatin (PRAVACHOL) 20 mg tablet Take 20 mg by mouth nightly.    Yes Historical Provider   tiotropium (SPIRIVA) 18 mcg inhalation capsule Take 1 Cap by inhalation daily. 10/12/16  Yes Historical Provider   ASPIRIN PO Take 81 mg by mouth. Yes Historical Provider   montelukast (SINGULAIR) 10 mg tablet Take 10 mg by mouth daily. Yes Phys Other, MD   fluticasone-salmeterol (ADVAIR DISKUS) 500-50 mcg/dose diskus inhaler Take 1 Puff by inhalation every twelve (12) hours. Yes Phys Other, MD   nitroglycerin (NITROSTAT) 0.4 mg SL tablet 0.4 mg by SubLINGual route every five (5) minutes as needed for Chest Pain. Yes Phys Other, MD   oxyCODONE-acetaminophen (PERCOCET) 5-325 mg per tablet Take 1 Tab by mouth every eight (8) hours as needed for Pain. Max Daily Amount: 3 Tabs. 8/2/18   Tully Severin, PA-C   lidocaine (XYLOCAINE) 5 % ointment APPLY THIN FILM RUB EXTERNALLY TWICE DAILY IN THE MORNING AND EVENING 12/27/16   Historical Provider         Visit Vitals    /75    Pulse 80    Ht 5' 9\" (1.753 m)    Wt 96.2 kg (212 lb)    BMI 31.31 kg/m2       Physical Exam   Constitutional: He is oriented to person, place, and time. He appears well-developed and well-nourished. HENT:   Head: Normocephalic and atraumatic. Eyes: Conjunctivae are normal.   Neck: Neck supple. No JVD present. No tracheal deviation present. No thyromegaly present. Cardiovascular: Normal rate, regular rhythm and normal heart sounds. Exam reveals no gallop and no friction rub. No murmur heard. Pulmonary/Chest: Breath sounds normal. No respiratory distress. He has no wheezes. He has no rales. He exhibits no tenderness. Abdominal: Soft. There is no tenderness. Musculoskeletal: He exhibits no edema. Neurological: He is alert and oriented to person, place, and time. Skin: Skin is warm and dry. Psychiatric: He has a normal mood and affect. I have personally reviewed patient's records available from hospital and other providers and incorporated findings in patient care.   2014  IMPRESSION  1. Angiographically normal coronary arteries  2. Normal left ventricular systolic function with an ejection   fractio of 65%  3. Normal left sided filling pressures  4. No evidence of Aortic stenosis or Mitral regurgitation  Stress test    TECHNIQUE:  Following the resting injection of 8.5 mCi of technetium-99m-sestamibi and at stress 0.4 mg of intravenous Lexiscan and 30.5 mCi of technetium-99m-sestamibi, resting and stress images of the left ventricular myocardium were obtained in the SPECT mode. FINDINGS:  Review of stress and rest nuclear SPECT images show no pathologic perfusion defects noted. This is therefore felt to represent a study with no diagnostic abnormalities and no definite evidence for infarct or ischemia. Review of gated images shows a normal left ventricular ejection fraction of 57%. There are no segmental wall motion abnormalities seen. 2014: Echocardiogram   LOW NORMAL LEFT VENTRICULAR SYSTOLIC FUNCTION; EJECTION FRACTION ESTIMATED AT 50%. 2. MILD DIASTOLIC DYSFUNCTION. 3. MILD CONCENTRIC LEFT VENTRICULAR HYPERTROPHY. 4. MILDLY DILATED RIGHT HEART WITH REDUCED SYSTOLIC FUNCTION. 5. NO HEMODYNAMICALLY SIGNIFICANT VALVULAR PATHOLOGY. 6. NORMAL PULMONARY ARTERY PRESSURE ESTIMATED AT 28 MMHG. 7. NO MASSES, SHUNTS OR THROMBI SEEN; NEGATIVE BUBBLE STUDY. I Have personally reviewed recent relevant labs available and discussed with patient  I have personally reviewed patients ekg done at other facility. Mr. Lisa Villarreal has a reminder for a \"due or due soon\" health maintenance. I have asked that he contact his primary care provider for follow-up on this health maintenance. IMPRESSION; 7/2018  1. Abnormal scan  2. Fixed inferobasal perfusion defect which could be related to small area of scarring although attenuation cannot be entirely ruled out. 3.  Global left ventricular hypokinesis and reduced ejection fraction which could be related to technical limitations.   Would recommend correlation with echocardiogram exam.  4.  Medium risk can do to reduce ejection fraction on these nuclear stress test.     Interpretation Summary 8/2018  · Calculated left ventricular ejection fraction is 60%. Left ventricular mild concentric hypertrophy. Normal left ventricular wall motion, no regional wall motion abnormality noted. · Mild mitral annular calcification. Trace mitral valve regurgitation. Assessment         ICD-10-CM ICD-9-CM    1. Hypertensive left ventricular hypertrophy, without heart failure I11.9 402.90     Mild LVH with controlled blood pressure   2. Coronary artery disease involving native coronary artery of native heart without angina pectoris I25.10 414.01     History of MI in 2012. Small fixed inferior defect. Continue monitoring clinically   3. Abnormal EKG R94.31 794.31     Small fixed inferior defect. Asymptomatic we will monitor clinically. Nuclear stress test showed low EF but echo shows normal ejection fraction     8/2018Current and old EKGs are reviewed. Old cardiac testing reviewed. Likely nonspecific hypertensive disease changes on EKG but with complaint of shortness of breath we will proceed with stress test and echocardiogram exam to rule out any evidence of ischemia and assess LV function. Medications Discontinued During This Encounter   Medication Reason    omega 3-dha-epa-fish oil (FISH OIL) 60- mg cap Not A Current Medication    DULoxetine (CYMBALTA) 30 mg capsule Not A Current Medication    cetirizine-psuedoePHEDrine (ZYRTEC-D) 5-120 mg per tablet Not A Current Medication       No orders of the defined types were placed in this encounter. Follow-up Disposition:  Return in about 6 months (around 2/22/2019).

## 2018-08-22 NOTE — LETTER
Junior Lunsford 1970 8/22/2018 Dear Evens Pretty MD 
 
I had the pleasure of evaluating  Mr. Chai Prince in office today. Below are the relevant portions of my assessment and plan of care. ICD-10-CM ICD-9-CM 1. Hypertensive left ventricular hypertrophy, without heart failure I11.9 402.90 Mild LVH with controlled blood pressure 2. Coronary artery disease involving native coronary artery of native heart without angina pectoris I25.10 414.01 History of MI in 2012. Small fixed inferior defect. Continue monitoring clinically 3. Abnormal EKG R94.31 794.31 Small fixed inferior defect. Asymptomatic we will monitor clinically. Nuclear stress test showed low EF but echo shows normal ejection fraction Current Outpatient Prescriptions Medication Sig Dispense Refill  glucose blood VI test strips (ASCENSIA AUTODISC VI, ONE TOUCH ULTRA TEST VI) strip Check fasting sugars daily before breakfast 100 Strip 11  Lancets misc Check fasting sugars daily before breakfast 100 Each 11  Blood-Glucose Meter monitoring kit Check fasting sugars daily before breakfast 1 Kit 0  
 metFORMIN ER (GLUCOPHAGE XR) 500 mg tablet Take 2 Tabs by mouth two (2) times a day. 360 Tab 3  
 metoprolol succinate (TOPROL-XL) 25 mg XL tablet Take 1 Tab by mouth daily. 90 Tab 1  
 lisinopril (PRINIVIL, ZESTRIL) 20 mg tablet Take 1 Tab by mouth daily. 90 Tab 1  
 fluticasone (FLONASE) 50 mcg/actuation nasal spray 2 Sprays by Both Nostrils route daily. 1 Bottle 0  
 glipiZIDE (GLUCOTROL) 10 mg tablet Take 1 Tab by mouth two (2) times a day. 60 Tab 2  
 PARoxetine (PAXIL) 20 mg tablet Take 20 mg by mouth daily.  QUEtiapine (SEROQUEL) 100 mg tablet Take 100 mg by mouth nightly.     
 cyclobenzaprine (FLEXERIL) 10 mg tablet Take one po qhs prn muscle spasms 15 Tab 0  
 docusate sodium (COLACE) 100 mg capsule Take one po daily prn constipation 90 Cap 3  
  omeprazole (PRILOSEC) 40 mg capsule as needed  10  
 albuterol (PROVENTIL HFA, VENTOLIN HFA, PROAIR HFA) 90 mcg/actuation inhaler 1-2 Puffs.  loratadine-pseudoephedrine (CLARITIN-D 24-HOUR)  mg per tablet Take 1 Tab by Mouth Once a Day.  pravastatin (PRAVACHOL) 20 mg tablet Take 20 mg by mouth nightly.  tiotropium (SPIRIVA) 18 mcg inhalation capsule Take 1 Cap by inhalation daily.  ASPIRIN PO Take 81 mg by mouth.  montelukast (SINGULAIR) 10 mg tablet Take 10 mg by mouth daily.  fluticasone-salmeterol (ADVAIR DISKUS) 500-50 mcg/dose diskus inhaler Take 1 Puff by inhalation every twelve (12) hours.  nitroglycerin (NITROSTAT) 0.4 mg SL tablet 0.4 mg by SubLINGual route every five (5) minutes as needed for Chest Pain.  oxyCODONE-acetaminophen (PERCOCET) 5-325 mg per tablet Take 1 Tab by mouth every eight (8) hours as needed for Pain. Max Daily Amount: 3 Tabs. 42 Tab 0  
 lidocaine (XYLOCAINE) 5 % ointment APPLY THIN FILM RUB EXTERNALLY TWICE DAILY IN THE MORNING AND EVENING  3 No orders of the defined types were placed in this encounter. If you have questions, please do not hesitate to call me. I look forward to following Mr. Rupinder Jackson along with you. Sincerely, Cady Chaudhry MD

## 2018-08-22 NOTE — PROGRESS NOTES
Patient didn't bring medications, verbally reviewed. 1. Have you been to the ER, urgent care clinic since your last visit? Hospitalized since your last visit? No    2. Have you seen or consulted any other health care providers outside of the [address] [place] [address] since your last visit? Include any pap smears or colon screening.  Yes Where: [facility]opedic Reason for visit: Knee

## 2018-10-25 ENCOUNTER — OFFICE VISIT (OUTPATIENT)
Dept: INTERNAL MEDICINE CLINIC | Age: 48
End: 2018-10-25

## 2018-10-25 VITALS
HEART RATE: 74 BPM | RESPIRATION RATE: 18 BRPM | SYSTOLIC BLOOD PRESSURE: 117 MMHG | WEIGHT: 216.2 LBS | TEMPERATURE: 98.2 F | DIASTOLIC BLOOD PRESSURE: 79 MMHG | OXYGEN SATURATION: 96 % | HEIGHT: 69 IN | BODY MASS INDEX: 32.02 KG/M2

## 2018-10-25 DIAGNOSIS — Z23 ENCOUNTER FOR IMMUNIZATION: ICD-10-CM

## 2018-10-25 DIAGNOSIS — I10 BENIGN HYPERTENSION WITHOUT CHF: ICD-10-CM

## 2018-10-25 DIAGNOSIS — E78.5 DYSLIPIDEMIA: ICD-10-CM

## 2018-10-25 DIAGNOSIS — Z00.00 ENCOUNTER FOR MEDICARE ANNUAL WELLNESS EXAM: Primary | ICD-10-CM

## 2018-10-25 DIAGNOSIS — Z76.0 MEDICATION REFILL: ICD-10-CM

## 2018-10-25 DIAGNOSIS — M25.561 CHRONIC PAIN OF RIGHT KNEE: ICD-10-CM

## 2018-10-25 DIAGNOSIS — G89.29 CHRONIC PAIN OF RIGHT KNEE: ICD-10-CM

## 2018-10-25 RX ORDER — MELOXICAM 7.5 MG/1
TABLET ORAL
Qty: 60 TAB | Refills: 3 | Status: SHIPPED | OUTPATIENT
Start: 2018-10-25 | End: 2020-10-06

## 2018-10-25 RX ORDER — ALBUTEROL SULFATE 90 UG/1
AEROSOL, METERED RESPIRATORY (INHALATION)
Qty: 1 INHALER | Refills: 3 | Status: SHIPPED | OUTPATIENT
Start: 2018-10-25 | End: 2018-11-23 | Stop reason: SDUPTHER

## 2018-10-25 RX ORDER — PRAVASTATIN SODIUM 20 MG/1
20 TABLET ORAL
Qty: 90 TAB | Refills: 3 | Status: SHIPPED | OUTPATIENT
Start: 2018-10-25 | End: 2018-11-20 | Stop reason: SDUPTHER

## 2018-10-25 RX ORDER — METOPROLOL SUCCINATE 25 MG/1
25 TABLET, EXTENDED RELEASE ORAL DAILY
Qty: 90 TAB | Refills: 1 | Status: SHIPPED | OUTPATIENT
Start: 2018-10-25 | End: 2018-11-20 | Stop reason: SDUPTHER

## 2018-10-25 RX ORDER — METFORMIN HYDROCHLORIDE 500 MG/1
1000 TABLET, EXTENDED RELEASE ORAL 2 TIMES DAILY
Qty: 360 TAB | Refills: 3 | Status: SHIPPED | OUTPATIENT
Start: 2018-10-25 | End: 2018-11-20 | Stop reason: SDUPTHER

## 2018-10-25 RX ORDER — GLIPIZIDE 10 MG/1
10 TABLET ORAL 2 TIMES DAILY
Qty: 60 TAB | Refills: 2 | Status: SHIPPED | OUTPATIENT
Start: 2018-10-25 | End: 2018-11-20 | Stop reason: SDUPTHER

## 2018-10-25 RX ORDER — LISINOPRIL 20 MG/1
20 TABLET ORAL DAILY
Qty: 90 TAB | Refills: 1 | Status: SHIPPED | OUTPATIENT
Start: 2018-10-25 | End: 2018-11-23 | Stop reason: SDUPTHER

## 2018-10-25 NOTE — ACP (ADVANCE CARE PLANNING)

## 2018-10-25 NOTE — PROGRESS NOTES
Chief Complaint   Patient presents with    Hypertension     3 month f/u    Diabetes     3 month f/u    Cholesterol Problem     3 month f/u    Medication Refill    Annual Wellness Visit       HPI:     Anila Troncoso is a 51 y.o.  male with history of type 2 DM, hypertension and dyslipidemia here for the above complaint. He has some shortness of breath while working in his house. No chest pain, shortness of breath, abdominal pain, headaches, dizziness. Type 2 DM: 130's-140's. He has right knee pain constant pain. Pain scale 7/10. No no radiation down legs. Around the knee on right knee. Throbbing pain. He wants something for pain and wants referral to pain management. Past Medical History:   Diagnosis Date    Abnormal EKG 7/9/2018    Chronic ST-T changes noted likely from LVH. Prior cardiac workup includes stress test and cardiac catheterization. Reviewed    Advance directive discussed with patient 05/03/2017    Arthritis     left knee    Asthma     CAD (coronary artery disease)     heart attack 6/2012, Being followed by Dr. Paul Wray Cocaine use 2017    Depression     Diabetes ([facility] [address]     DVT (deep venous thrombosis) (HCC)     left leg in the past (Pt was on coumadin)    GERD (gastroesophageal reflux disease)     Hyperlipidemia 7/9/2018    cramps with pravastatin. Consider alternate therapy after surgery    Hypertension     Hypertensive heart disease without heart failure 7/9/2018    Blood pressure control. LVH on EKG.  Hypertensive left ventricular hypertrophy, without heart failure 7/9/2018    Mild LVH on last echo.  Medication refill 7/9/2018    Pre-operative cardiovascular examination 7/9/2018    Knee surgery. Stable cardiac status.   Okay to proceed with moderate cardiac risk    Psychiatric disorder     depression and schziophenia    Schizophrenia ([facility] [address]     Will be seeing Carbonated Content    Type 2 diabetes mellitus without complication, without long-term current use of insulin ([facility] [address] 7/9/2018    On treatment    Unspecified sleep apnea     cpap machine,Will be seeing neurologist     Past Surgical History:   Procedure Laterality Date    ABDOMEN SURGERY [address] HX COLONOSCOPY      HX HEART CATHETERIZATION  2014    cardiac cath - normal coronaries    HX HERNIA REPAIR      HX KNEE REPLACEMENT      left TKR    HX ORTHOPAEDIC      L knee 1987, 1996    HX OTHER SURGICAL      eye sx at 2 yrs old     Current Outpatient Medications   Medication Sig    glipiZIDE (GLUCOTROL) 10 mg tablet Take 1 Tab by mouth two (2) times a day.  metFORMIN ER (GLUCOPHAGE XR) 500 mg tablet Take 2 Tabs by mouth two (2) times a day.  metoprolol succinate (TOPROL-XL) 25 mg XL tablet Take 1 Tab by mouth daily.  lisinopril (PRINIVIL, ZESTRIL) 20 mg tablet Take 1 Tab by mouth daily.  albuterol (PROVENTIL HFA, VENTOLIN HFA, PROAIR HFA) 90 mcg/actuation inhaler Take 1-2 puffs every 4-6 hrs prn shortness of breath    pravastatin (PRAVACHOL) 20 mg tablet Take 1 Tab by mouth nightly.  meloxicam (MOBIC) 7.5 mg tablet Take 1-2 po daily prn for pain    glucose blood VI test strips (ASCENSIA AUTODISC VI, ONE TOUCH ULTRA TEST VI) strip Check fasting sugars daily before breakfast    Lancets misc Check fasting sugars daily before breakfast    Blood-Glucose Meter monitoring kit Check fasting sugars daily before breakfast    PARoxetine (PAXIL) 20 mg tablet Take 20 mg by mouth daily.  QUEtiapine (SEROQUEL) 100 mg tablet Take 100 mg by mouth nightly.  lidocaine (XYLOCAINE) 5 % ointment APPLY THIN FILM RUB EXTERNALLY TWICE DAILY IN THE MORNING AND EVENING    omeprazole (PRILOSEC) 40 mg capsule as needed    tiotropium (SPIRIVA) 18 mcg inhalation capsule Take 1 Cap by inhalation daily.  ASPIRIN PO Take 81 mg by mouth.  montelukast (SINGULAIR) 10 mg tablet Take 10 mg by mouth daily.     fluticasone-salmeterol (ADVAIR DISKUS) 500-50 mcg/dose diskus inhaler Take 1 Puff by inhalation every twelve (12) hours.  nitroglycerin (NITROSTAT) 0.4 mg SL tablet 0.4 mg by SubLINGual route every five (5) minutes as needed for Chest Pain. No current facility-administered medications for this visit. Health Maintenance   Topic Date Due    FOOT EXAM Q1  02/05/1979    EYE EXAM RETINAL OR DILATED Q1  05/03/2018    Influenza Age 9 to Adult  08/01/2018    MEDICARE YEARLY EXAM  10/18/2018    HEMOGLOBIN A1C Q6M  01/02/2019    MICROALBUMIN Q1  05/02/2019    LIPID PANEL Q1  05/02/2019    DTaP/Tdap/Td series (2 - Td) 12/12/2026    Pneumococcal 19-64 Medium Risk  Completed     Immunization History   Administered Date(s) Administered    Influenza Vaccine 09/01/2014, 10/27/2014    Influenza Vaccine (Quad) PF 10/25/2018    Pneumococcal Polysaccharide (PPSV-23) 10/31/2013, 11/04/2014     No LMP for male patient. Allergies and Intolerances: Allergies   Allergen Reactions    Iodine Anaphylaxis    Norco [Hydrocodone-Acetaminophen] Nausea and Vomiting     Gi distress    Shellfish Derived Swelling and Angioedema     Other reaction(s): anaphylaxis/angioedema  [employer]       Family History:   Family History   Problem Relation Age of Onset    Anemia Mother     Cancer Father         prostate    Cancer Brother     Diabetes Brother        Social History:   He  reports that he has been smoking cigars. He has been smoking about 0.25 packs per day. he has never used smokeless tobacco.  He  reports that he drinks alcohol. Objective:   Physical exam:   Visit Vitals  /79 (BP 1 Location: Left arm, BP Patient Position: Sitting)   Pulse 74   Temp 98.2 °F (36.8 °C) (Oral)   Resp 18   Ht 5' 9\" (1.753 m)   Wt 216 lb 3.2 oz (98.1 kg)   SpO2 96%   BMI 31.93 kg/m²        Generally: Pleasant male in no acute distress  Cardiac Exam: regular, rate, and rhythm. Normal S1 and S2.  No murmurs, gallops, or rubs  Pulmonary exam: Clear to auscultation bilaterally  Abdominal exam: Positive bowel sounds in all four quadrants, soft, nondistended, nontender  Extremities: 2+ dorsalis pedis pulses bilaterally. No pedal edema    bilaterally    LABS/Radiological Tests:     Component      Latest Ref Rng & Units 5/2/2018 5/2/2018 5/2/2018 5/2/2018           4:23 PM  4:23 PM  4:23 PM  4:23 PM   Sodium      136 - 145 mmol/L    140   Potassium      3.5 - 5.5 mmol/L    4.2   Chloride      100 - 108 mmol/L    101   CO2      21 - 32 mmol/L    31   Anion gap      3.0 - 18 mmol/L    8   Glucose      74 - 99 mg/dL    264 (H)   BUN      7.0 - 18 MG/DL    10   Creatinine      0.6 - 1.3 MG/DL    1.41 (H)   BUN/Creatinine ratio      12 - 20      7 (L)   GFR est AA      >60 ml/min/1.73m2    >60   GFR est non-AA      >60 ml/min/1.73m2    53 (L)   Calcium      8.5 - 10.1 MG/DL    9.1   Bilirubin, total      0.2 - 1.0 MG/DL    0.2   ALT (SGPT)      16 - 61 U/L    93 (H)   AST      15 - 37 U/L    38 (H)   Alk. phosphatase      45 - 117 U/L    91   Protein, total      6.4 - 8.2 g/dL    6.6   Albumin      3.4 - 5.0 g/dL    3.8   Globulin      2.0 - 4.0 g/dL    2.8   A-G Ratio      0.8 - 1.7      1.4   Cholesterol, total      <200 MG/DL       Triglyceride      <150 MG/DL       HDL Cholesterol      40 - 60 MG/DL       LDL, calculated      0 - 100 MG/DL       VLDL, calculated      MG/DL       CHOL/HDL Ratio      0 - 5.0         Microalbumin,urine random      0 - 3.0 MG/DL 4.60 (H)      Creatinine, urine      30 - 125 mg/dL 484.00 (H)      Microalbumin/Creat.  Ratio      0 - 30 mg/g 10      Hemoglobin A1c, (calculated)      4.2 - 5.6 %  9.0 (H)     TSH      0.36 - 3.74 uIU/mL   1.88      Component      Latest Ref Rng & Units 5/2/2018           4:23 PM   Sodium      136 - 145 mmol/L    Potassium      3.5 - 5.5 mmol/L    Chloride      100 - 108 mmol/L    CO2      21 - 32 mmol/L    Anion gap      3.0 - 18 mmol/L    Glucose      74 - 99 mg/dL    BUN      7.0 - 18 MG/DL    Creatinine 0.6 - 1.3 MG/DL    BUN/Creatinine ratio      12 - 20      GFR est AA      >60 ml/min/1.73m2    GFR est non-AA      >60 ml/min/1.73m2    Calcium      8.5 - 10.1 MG/DL    Bilirubin, total      0.2 - 1.0 MG/DL    ALT (SGPT)      16 - 61 U/L    AST      15 - 37 U/L    Alk. phosphatase      45 - 117 U/L    Protein, total      6.4 - 8.2 g/dL    Albumin      3.4 - 5.0 g/dL    Globulin      2.0 - 4.0 g/dL    A-G Ratio      0.8 - 1.7      Cholesterol, total      <200 MG/   Triglyceride      <150 MG/ (H)   HDL Cholesterol      40 - 60 MG/DL 39 (L)   LDL, calculated      0 - 100 MG/DL 94.6   VLDL, calculated      MG/DL 60.4   CHOL/HDL Ratio      0 - 5.0   5.0   Microalbumin,urine random      0 - 3.0 MG/DL    Creatinine, urine      30 - 125 mg/dL    Microalbumin/Creat. Ratio      0 - 30 mg/g    Hemoglobin A1c, (calculated)      4.2 - 5.6 %    TSH      0.36 - 3.74 uIU/mL      All lab results and radiological studies were discussed and reviewed with the patient. ASSESSMENT/PLAN:    1. Encounter for medicare wellness exam    2. Uncontrolled type 2 diabetes mellitus without complication, without long-term current use of insulin [facility]): we will see what the labs show. Continue diet, exercise and metformin and glipizide.   -     glipiZIDE (GLUCOTROL) 10 mg tablet; Take 1 Tab by mouth two (2) times a day. -     metFORMIN ER (GLUCOPHAGE XR) 500 mg tablet; Take 2 Tabs by mouth two (2) times a day. -     REFERRAL TO PODIATRY  -     HEMOGLOBIN A1C WITH EAG; Future  -     TSH 3RD GENERATION; Future    3. Benign hypertension without CHF: stable. Continue the toprol, lisinopril, and diet and exercise. -     metoprolol succinate (TOPROL-XL) 25 mg XL tablet; Take 1 Tab by mouth daily. -     lisinopril (PRINIVIL, ZESTRIL) 20 mg tablet; Take 1 Tab by mouth daily. 4. Dyslipidemia: we will see what the labs show. Continue the pravastatin, diet and exercise. -     pravastatin (PRAVACHOL) 20 mg tablet;  Take 1 Tab by mouth nightly. -     METABOLIC PANEL, COMPREHENSIVE; Future  -     LIPID PANEL; Future    5. Chronic pain of right knee: we will start the mobic. Take mobic With food. If you notice any blood/black tarry stools, diarrhea, abdominal pain, nausea, vomiting then stop medication immediately. Give us a call ASAP. Use heating pad, icy/hot, tiger balm for pain. Refer to pain management  -     meloxicam (MOBIC) 7.5 mg tablet; Take 1-2 po daily prn for pain  -     REFERRAL TO PAIN MANAGEMENT    6. Encounter for immunization  -     INFLUENZA VIRUS VAC QUAD,SPLIT,PRESV FREE SYRINGE IM  -     ADMIN INFLUENZA VIRUS VAC    7. Medication refill  -     glipiZIDE (GLUCOTROL) 10 mg tablet; Take 1 Tab by mouth two (2) times a day. -     metFORMIN ER (GLUCOPHAGE XR) 500 mg tablet; Take 2 Tabs by mouth two (2) times a day. -     metoprolol succinate (TOPROL-XL) 25 mg XL tablet; Take 1 Tab by mouth daily. -     lisinopril (PRINIVIL, ZESTRIL) 20 mg tablet; Take 1 Tab by mouth daily. -     albuterol (PROVENTIL HFA, VENTOLIN HFA, PROAIR HFA) 90 mcg/actuation inhaler; Take 1-2 puffs every 4-6 hrs prn shortness of breath  -     REFERRAL TO PODIATRY    8.   Requested Prescriptions     Signed Prescriptions Disp Refills    glipiZIDE (GLUCOTROL) 10 mg tablet 60 Tab 2     Sig: Take 1 Tab by mouth two (2) times a day.  metFORMIN ER (GLUCOPHAGE XR) 500 mg tablet 360 Tab 3     Sig: Take 2 Tabs by mouth two (2) times a day.  metoprolol succinate (TOPROL-XL) 25 mg XL tablet 90 Tab 1     Sig: Take 1 Tab by mouth daily.  lisinopril (PRINIVIL, ZESTRIL) 20 mg tablet 90 Tab 1     Sig: Take 1 Tab by mouth daily.  albuterol (PROVENTIL HFA, VENTOLIN HFA, PROAIR HFA) 90 mcg/actuation inhaler 1 Inhaler 3     Sig: Take 1-2 puffs every 4-6 hrs prn shortness of breath    pravastatin (PRAVACHOL) 20 mg tablet 90 Tab 3     Sig: Take 1 Tab by mouth nightly.  meloxicam (MOBIC) 7.5 mg tablet 60 Tab 3     Sig: Take 1-2 po daily prn for pain       9. Patient verbalized understanding and agreement with the plan. 10. Patient was given an after-visit summary. 11.   Follow-up Disposition:  Return in about 3 months (around 1/25/2019) for f/u DM/HTN/lipids. or sooner if worsening symptoms.                 Arsh Hollis MD

## 2018-10-25 NOTE — PATIENT INSTRUCTIONS
1) FOllow-up in 3 months or sooner if worsening symptoms. 2)Take mobic  With food. If you notice any blood/black tarry stools, diarrhea, abdominal pain, nausea, vomiting then stop medication immediately. Give us a call ASAP. Advance Care Planning: Care Instructions  Your Care Instructions    It can be hard to live with an illness that cannot be cured. But if your health is getting worse, you may want to make decisions about end-of-life care. Planning for the end of your life does not mean that you are giving up. It is a way to make sure that your wishes are met. Clearly stating your wishes can make it easier for your loved ones. Making plans while you are still able may also ease your mind and make your final days less stressful and more meaningful. Follow-up care is a key part of your treatment and safety. Be sure to make and go to all appointments, and call your doctor if you are having problems. It's also a good idea to know your test results and keep a list of the medicines you take. What can you do to plan for the end of life? · You can bring these issues up with your doctor. You do not need to wait until your doctor starts the conversation. You might start with \"I would not be willing to live with . Jade Lindsey \" When you complete this sentence it helps your doctor understand your wishes. · Talk openly and honestly with your doctor. This is the best way to understand the decisions you will need to make as your health changes. Know that you can always change your mind. · Ask your doctor about commonly used life-support measures. These include tube feedings, breathing machines, and fluids given through a vein (IV). Understanding these treatments will help you decide whether you want them. · You may choose to have these life-supporting treatments for a limited time. This allows a trial period to see whether they will help you.  You may also decide that you want your doctor to take only certain measures to keep you alive. It is important to spell out these conditions so that your doctor and family understand them. · Talk to your doctor about how long you are likely to live. He or she may be able to give you an idea of what usually happens with your specific illness. · Think about preparing papers that state your wishes. This way there will not be any confusion about what you want. You can change your instructions at any time. Which papers should you prepare? Advance directives are legal papers that tell doctors how you want to be cared for at the end of your life. You do not need a  to write these papers. Ask your doctor or your [facility] department for information on how to write your advance directives. They may have the forms for each of these types of papers. Make sure your doctor has a copy of these on file, and give a copy to a family member or close friend. · Consider a do-not-resuscitate order (DNR). This order asks that no extra treatments be done if your heart stops or you stop breathing. Extra treatments may include cardiopulmonary resuscitation (CPR), electrical shock to restart your heart, or a machine to breathe for you. If you decide to have a DNR order, ask your doctor to explain and write it. Place the order in your home where everyone can easily see it. · Consider a living will. A living will explains your wishes about life support and other treatments at the end of your life if you become unable to speak for yourself. Living lea tell doctors to use or not use treatments that would keep you alive. You must have one or two witnesses or a notary present when you sign this form. · Consider a durable power of  for health care. This allows you to name a person to make decisions about your care if you are not able to. Most people ask a close friend or family member. Talk to this person about the kinds of treatments you want and those that you do not want.  Make sure this person understands your wishes. These legal papers are simple to change. Tell your doctor what you want to change, and ask him or her to make a note in your medical file. Give your family updated copies of the papers. Where can you learn more? Go to http://carlos [email or web address]-fernando.info/. Enter P184 in the search box to learn more about \"Advance Care Planning: Care Instructions. \"  Current as of: April 19, 2018  Content Version: 11.8  © 5076-6305 [employer]. Care instructions adapted under license by [employer] (which disclaims liability or warranty for this information). If you have questions about a medical condition or this instruction, always ask your healthcare professional. [address] any warranty or liability for your use of this information. Learning About Durable Power of  for Health Care  What is a durable power of  for health care? A durable power of  for health care is one type of the legal forms called advance directives. It lets you decide who you want to make treatment decisions for you if you cannot speak or decide for yourself. The person you choose is called your health care agent. Another type of advance directive is a living will. It lets you write down what kinds of treatment or life support you want or do not want. What should you think about when choosing a health care agent? Choose your health care agent carefully. This person may or may not be a family member. Talk to the person before you make your final decision. Make sure he or she is comfortable with this responsibility. It's a good idea to choose someone who:  · Is at least 25years old. · Knows you well and understands what makes life meaningful for you. · Understands your Confucianist and moral values. · Will do what you want, not what he or she wants. · Will be able to make difficult choices at a stressful time.   · Will be able to refuse or stop treatment, if that is what you would want, even if you could die. · Will be firm and confident with health professionals if needed. · Will ask questions to get necessary information. · Lives near you or agrees to travel to you if needed. Your family may help you make medical decisions while you can still be part of that process. But it is important to choose one person to be your health care agent in case you are not able to make decisions for yourself. If you don't fill out the legal form and name a health care agent, the decisions your family can make may be limited. Who will make decisions for you if you do not have a health care agent? If you don't have a health care agent or a living will, your family members may disagree about your medical care. And then some medical professionals who may not know you as well might have to make decisions for you. In some cases, a  makes the decisions. When you name a health care agent, it is very clear who has the power to make health decisions for you. How do you name a health care agent? You name your health care agent on a legal form. It is usually called a durable power of  for health care. Ask your hospital, state bar association, or office on aging where to find these forms. You must sign the form to make it legal. Some states require you to get the form notarized. This means that a person called a  watches you sign the form and then he or she signs the form. Some states also require that two or more witnesses sign the form. Be sure to tell your family members and doctors who your health care agent is. Keep your forms in a safe place. But make sure that your loved ones know where the forms are. This could be in your desk where you keep other important papers. Make sure your doctor has a copy of your forms. Where can you learn more? Go to http://carlos [email or web address]-fernando.[email or web address]   Enter [id number] in the search box to learn more about \"Learning About Durable Power of  for [facility]. \"  Current as of: April 19, 2018  Content Version: 11.8  © 8709-0892 Healthwise, [employer]. Care instructions adapted under license by [employer] (which disclaims liability or warranty for this information). If you have questions about a medical condition or this instruction, always ask your healthcare professional. [facility] any warranty or liability for your use of this information. Learning About [employer]  What is a living will? A living will is a legal form you use to write down the kind of care you want at the end of your life. It is used by the health professionals who will treat you if you aren't able to decide for yourself. If you put your wishes in writing, your loved ones and others will know what kind of care you want. They won't need to guess. This can ease your mind and be helpful to others. A living will is not the same as an estate or property will. An estate will explains what you want to happen with your money and property after you die. Is a living will a legal document? A living will is a legal document. Each state has its own laws about living lea. If you move to another state, make sure that your living will is legal in the state where you now live. Or you might use a universal form that has been approved by many states. This kind of form can sometimes be completed and stored online. Your electronic copy will then be available wherever you have a connection to the Internet. In most cases, doctors will respect your wishes even if you have a form from a different state. · You don't need an  to complete a living will. But legal advice can be helpful if your state's laws are unclear, your health history is complicated, or your family can't agree on what should be in your living will. · You can change your living will at any time.  Some people find that their wishes about end-of-life care change as their health changes. · In addition to making a living will, think about completing a medical power of  form. This form lets you name the person you want to make end-of-life treatment decisions for you (your \"health care agent\") if you're not able to. Many hospitals and nursing homes will give you the forms you need to complete a living will and a medical power of . · Your living will is used only if you can't make or communicate decisions for yourself anymore. If you become able to make decisions again, you can accept or refuse any treatment, no matter what you wrote in your living will. · Your state may offer an online registry. This is a place where you can store your living will online so the doctors and nurses who need to treat you can find it right away. What should you think about when creating a living will? Talk about your end-of-life wishes with your family members and your doctor. Let them know what you want. That way the people making decisions for you won't be surprised by your choices. Think about these questions as you make your living will:  · Do you know enough about life support methods that might be used? If not, talk to your doctor so you know what might be done if you can't breathe on your own, your heart stops, or you're unable to swallow. · What things would you still want to be able to do after you receive life-support methods? Would you want to be able to walk? To speak? To eat on your own? To live without the help of machines? · If you have a choice, where do you want to be cared for? In your home? At a hospital or nursing home? · Do you want certain Cheondoism practices performed if you become very ill? · If you have a choice at the end of your life, where would you prefer to die? At home? In a hospital or nursing home? Somewhere else? · Would you prefer to be buried or cremated?   · Do you want your organs to be donated after you die? What should you do with your living will? · Make sure that your family members and your health care agent have copies of your living will. · Give your doctor a copy of your living will to keep in your medical record. If you have more than one doctor, make sure that each one has a copy. · You may want to put a copy of your living will where it can be easily found. Where can you learn more? Go to http://carlos [email or web address]-fernando.info/. Enter E490 in the search box to learn more about \"Learning About Living Perroy. \"  Current as of: April 19, 2018  Content Version: 11.8  © 1287-4700 [employer]. Care instructions adapted under license by [employer] (which disclaims liability or warranty for this information). If you have questions about a medical condition or this instruction, always ask your healthcare professional. [place] [address] any warranty or liability for your use of this information. Advance Directives: Care Instructions  Your Care Instructions  An advance directive is a legal way to state your wishes at the end of your life. It tells your family and your doctor what to do if you can no longer say what you want. There are two main types of advance directives. You can change them any time that your wishes change. · A living will tells your family and your doctor your wishes about life support and other treatment. · A durable power of  for health care lets you name a person to make treatment decisions for you when you can't speak for yourself. This person is called a health care agent. If you do not have an advance directive, decisions about your medical care may be made by a doctor or a  who doesn't know you. It may help to think of an advance directive as a gift to the people who care for you. If you have one, they won't have to make tough decisions by themselves.   Follow-up care is a key part of your treatment and safety. Be sure to make and go to all appointments, and call your doctor if you are having problems. It's also a good idea to know your test results and keep a list of the medicines you take. How can you care for yourself at home? · Discuss your wishes with your loved ones and your doctor. This way, there are no surprises. · Many states have a unique form. Or you might use a universal form that has been approved by many states. This kind of form can sometimes be completed and stored online. Your electronic copy will then be available wherever you have a connection to the Internet. In most cases, doctors will respect your wishes even if you have a form from a different state. · You don't need a  to do an advance directive. But you may want to get legal advice. · Think about these questions when you prepare an advance directive:  ? Who do you want to make decisions about your medical care if you are not able to? Many people choose a family member or close friend. ? Do you know enough about life support methods that might be used? If not, talk to your doctor so you understand. ? What are you most afraid of that might happen? You might be afraid of having pain, losing your independence, or being kept alive by machines. ? Where would you prefer to die? Choices include your home, a hospital, or a nursing home. ? Would you like to have information about hospice care to support you and your family? ? Do you want to donate organs when you die? ? Do you want certain Pentecostalism practices performed before you die? If so, put your wishes in the advance directive. · Read your advance directive every year, and make changes as needed. When should you call for help? Be sure to contact your doctor if you have any questions. Where can you learn more? Go to http://carlos a-fernando.info/. Enter R264 in the search box to learn more about \"Advance Directives: Care Instructions. \"  Current as of: April 19, 2018  Content Version: 11.8  © 4725-3976 [employer]. Care instructions adapted under license by [employer] (which disclaims liability or warranty for this information). If you have questions about a medical condition or this instruction, always ask your healthcare professional. [address] any warranty or liability for your use of this information. Deciding About Life-Prolonging Treatment  Deciding About Life-Prolonging Treatment  What is life-prolonging treatment? There are many kinds of treatment that can help you live longer. These may be needed for only a short time until your illness improves. Or you may use them over the long term to help keep you alive. Some treatments include the use of:  · Medicines to slow the progress of certain diseases, such as heart disease, diabetes, cancer, AIDS, or Alzheimer's disease. · Antibiotics to treat serious infections, such as pneumonia. · Dialysis to clean your blood if your kidneys stop working. · A breathing machine to help you breathe if you can't breathe on your own. This machine pumps air into your lungs through a tube put into your throat. · A feeding tube or an intravenous (IV) line to give you food and fluids if you can't eat or drink. · Cardiopulmonary resuscitation (CPR) to try to restart your heart. The decision to receive treatments that may help you live longer is a personal one. You may want your doctor to do everything possible to keep you alive, even when your chance for recovery is small. Or you may choose to only have care to manage your pain and other symptoms. What are key points about this decision? · If there is a good chance that your illness can be cured or managed, your doctor may advise you to first try available treatments. If these don't work, then you might think about stopping treatment.   · If you stop treatment, you will still receive care that focuses on pain relief and comfort. · A decision to stop treatment that keeps you alive does not have to be permanent. You can always change your mind if your health starts to improve. · Even though treatment focuses on helping you live longer, it may cause side effects that can greatly affect your quality of life. And it could affect how you spend time with your family and friends. · If you still have personal goals that you want to pursue, you may want treatment that keeps you alive long enough to reach them. Why might you choose life-prolonging treatment? · There is a good chance that your illness can be cured or managed. · You think you can manage the possible side effects of treatment. · You don't think treatment will get in the way of your quality of life. · You have personal goals that you still want to pursue and achieve. Why might you choose to stop life-prolonging treatment? · Your chance of surviving your illness is very low. · You have tried all possible treatments for your illness, but they have not helped. · You can no longer deal with the side effects of treatment. · You have already met the goals you set out to achieve in your life. Your decision  Thinking about the facts and your feelings can help you make a decision that is right for you. Be sure you understand the benefits and risks of your options, and think about what else you need to do before you make the decision. Where can you learn more? Go to http://carlos a-fernando.info/. Enter K229 in the search box to learn more about \"Deciding About Life-Prolonging Treatment. \"  Current as of: October 6, 2017  Content Version: 11.8  © 4456-3441 Healthwise[employer]. Care instructions adapted under license by [employer] (which disclaims liability or warranty for this information).  If you have questions about a medical condition or this instruction, always ask your healthcare professional. [address] any warranty or liability for your use of this information.

## 2018-10-25 NOTE — PROGRESS NOTES
ROOM # 1    Monica Luther presents today for   Chief Complaint   Patient presents with    Hypertension     3 month f/u    Diabetes     3 month f/u    Cholesterol Problem     3 month f/u    Medication Refill    Annual Wellness Visit       Monica Luther preferred language for health care discussion is english/other. Is someone accompanying this pt? no    Is the patient using any DME equipment during OV? no    Depression Screening:  PHQ over the last two weeks 8/8/2017 7/25/2017 12/12/2016   Little interest or pleasure in doing things Several days Nearly every day Several days   Feeling down, depressed, irritable, or hopeless Nearly every day Nearly every day Several days   Total Score PHQ 2 4 6 2       Learning Assessment:  Learning Assessment 7/7/2016 5/18/2015   PRIMARY LEARNER Patient Patient   HIGHEST LEVEL OF EDUCATION - PRIMARY LEARNER  - 2 YEARS OF COLLEGE   PRIMARY LANGUAGE ENGLISH ENGLISH   LEARNER PREFERENCE PRIMARY LISTENING DEMONSTRATION   ANSWERED BY self patient   RELATIONSHIP SELF SELF       Abuse Screening:  Abuse Screening Questionnaire 10/25/2018   Do you ever feel afraid of your partner? N   Are you in a relationship with someone who physically or mentally threatens you? N   Is it safe for you to go home? Y       Fall Risk  Fall Risk Assessment, last 12 mths 8/4/2014   Able to walk? Yes   Fall in past 12 months? No       Health Maintenance reviewed and discussed per provider. Yes    Monica Luther is due for   Health Maintenance Due   Topic Date Due    FOOT EXAM Q1  02/05/1980    EYE EXAM RETINAL OR DILATED Q1  05/03/2018    Influenza Age 5 to Adult  08/01/2018    MEDICARE YEARLY EXAM  10/18/2018   Pt. To schedule [facility] for eye exam      Please order/place referral if appropriate. Advance Directive:  1. Do you have an advance directive in place? Patient Reply: no    2. If not, would you like material regarding how to put one in place?  Patient Reply: yes, given    Coordination of Care:  1. Have you been to the ER, urgent care clinic since your last visit? Hospitalized since your last visit? no    2. Have you seen or consulted any other health care providers outside of the [address] [place] [address] since your last visit? Include any pap smears or colon screening. no  Immunization History   Administered Date(s) Administered    Influenza Vaccine 09/01/2014, 10/27/2014    Influenza Vaccine (Quad) PF 10/25/2018    Pneumococcal Polysaccharide (PPSV-23) 10/31/2013, 11/04/2014     Immunization administered by Hannah Avila LPN with pt's consent. Patient tolerated procedure well. Pt. Observed for 10 mins. No reactions noted/reported, VIS given.

## 2018-10-25 NOTE — PROGRESS NOTES
Mercedes Abbott is a 51 y.o. male and presents for annual Medicare Wellness Visit. Problem List: Reviewed with patient and discussed risk factors.     Patient Active Problem List   Diagnosis Code    DJD (degenerative joint disease) of knee M17.10    Encounter for long-term (current) use of other medications Z79.899    Left knee pain M25.562    Chronic pain syndrome G89.4    History of total knee arthroplasty Z96.659    S/P surgical manipulation of knee joint Z98.890    History of anxiety Z86.59    Schizophrenia ([facility] [address] F20.9    Unspecified sleep apnea G47.30    Psychiatric disorder F99    Hypertension I10    GERD (gastroesophageal reflux disease) K21.9    Diabetes ([facility] [address] E11.9    Depression F32.9    CAD (coronary artery disease) I25.10    Asthma J45.909    Arthritis M19.90    Cocaine use F14.90    Advance directive discussed with patient Z70.80    Hypertensive heart disease without heart failure I11.9    Hypertensive left ventricular hypertrophy, without heart failure I11.9    Abnormal EKG R94.31    Pre-operative cardiovascular examination Z01.810    Type 2 diabetes mellitus without complication, without long-term current use of insulin (HCC) E11.9    Medication refill Z76.0    Hyperlipidemia E78.5    Primary osteoarthritis of right knee M17.11    Post-traumatic osteoarthritis of left knee M17.32       Current medical providers:  Patient Care Team:  Jordyn Lockwood MD as PCP - General (Internal Medicine)  Christiano Mathew MD (Orthopedic Surgery)  Ritu Lala MD (Neurology)  Ritu Lala DO (Sports Medicine)  Mike Peres RN as Ambulatory Care Navigator    PSH: Reviewed with patient  Past Surgical History:   Procedure Laterality Date    ABDOMEN SURGERY PROC UNLISTED      HX COLONOSCOPY      HX HEART CATHETERIZATION  2014    cardiac cath - normal coronaries    HX HERNIA REPAIR      HX KNEE REPLACEMENT      left TKR    HX ORTHOPAEDIC      L knee 1987, 1996    HX OTHER SURGICAL      eye sx at 2 yrs old        SH: Reviewed with patient  Social History     Tobacco Use    Smoking status: Current Every Day Smoker     Packs/day: 0.25     Types: Cigars    Smokeless tobacco: Never Used   Substance Use Topics    Alcohol use: Yes     Comment: \"a little\"/ occ    Drug use: Yes     Types: Marijuana     Comment: pt denies but pos for cocaine in 2017 and pos for marijuana in 2018       FH: Reviewed with patient  Family History   Problem Relation Age of Onset    Anemia Mother     Cancer Father         prostate    Cancer Brother     Diabetes Brother        Medications/Allergies: Reviewed with patient  Current Outpatient Medications on File Prior to Visit   Medication Sig Dispense Refill    glucose blood VI test strips (ASCENSIA AUTODISC VI, ONE TOUCH ULTRA TEST VI) strip Check fasting sugars daily before breakfast 100 Strip 11    Lancets misc Check fasting sugars daily before breakfast 100 Each 11    Blood-Glucose Meter monitoring kit Check fasting sugars daily before breakfast 1 Kit 0    PARoxetine (PAXIL) 20 mg tablet Take 20 mg by mouth daily.  QUEtiapine (SEROQUEL) 100 mg tablet Take 100 mg by mouth nightly.  lidocaine (XYLOCAINE) 5 % ointment APPLY THIN FILM RUB EXTERNALLY TWICE DAILY IN THE MORNING AND EVENING  3    omeprazole (PRILOSEC) 40 mg capsule as needed  10    tiotropium (SPIRIVA) 18 mcg inhalation capsule Take 1 Cap by inhalation daily.  ASPIRIN PO Take 81 mg by mouth.  montelukast (SINGULAIR) 10 mg tablet Take 10 mg by mouth daily.  fluticasone-salmeterol (ADVAIR DISKUS) 500-50 mcg/dose diskus inhaler Take 1 Puff by inhalation every twelve (12) hours.  nitroglycerin (NITROSTAT) 0.4 mg SL tablet 0.4 mg by SubLINGual route every five (5) minutes as needed for Chest Pain. No current facility-administered medications on file prior to visit.        Allergies   Allergen Reactions    Iodine Anaphylaxis    Norco [Hydrocodone-Acetaminophen] Nausea and Vomiting     Gi distress    Shellfish Derived Swelling and Angioedema     Other reaction(s): anaphylaxis/angioedema  [employer]       Objective:  Visit Vitals  /79 (BP 1 Location: Left arm, BP Patient Position: Sitting)   Pulse 74   Temp 98.2 °F (36.8 °C) (Oral)   Resp 18   Ht 5' 9\" (1.753 m)   Wt 216 lb 3.2 oz (98.1 kg)   SpO2 96%   BMI 31.93 kg/m²    Body mass index is 31.93 kg/m². Assessment of cognitive impairment: Alert and oriented x 3    Depression Screen:   PHQ over the last two weeks 8/8/2017   Little interest or pleasure in doing things Several days   Feeling down, depressed, irritable, or hopeless Nearly every day   Total Score PHQ 2 4       Fall Risk Assessment:    Fall Risk Assessment, last 12 mths 8/4/2014   Able to walk? Yes   Fall in past 12 months? No       Functional Ability:   Does the patient exhibit a steady gait? yes   How long did it take the patient to get up and walk from a sitting position? 5 seconds   Is the patient self reliant?  (ie can do own laundry, meals, household chores)  yes     Does the patient handle his/her own medications? yes     Does the patient handle his/her own money? yes     Is the patients home safe (ie good lighting, handrails on stairs and bath, etc.)? yes     Did you notice or did patient express any hearing difficulties? no     Did you notice or did patient express any vision difficulties? yes     Were distance and reading eye charts used? no       Advance Care Planning:   Patient was offered the opportunity to discuss advance care planning:  yes     Does patient have an Advance Directive:  no   If no, did you provide information on [facility]?   yes       Plan:      Orders Placed This Encounter    Influenza virus vaccine (QUADRIVALENT PRES FREE SYRINGE) IM ([id number])    METABOLIC PANEL, COMPREHENSIVE    LIPID PANEL    HEMOGLOBIN A1C WITH EAG    TSH 3RD GENERATION    REFERRAL TO PODIATRY    REFERRAL TO PAIN MANAGEMENT    Administration fee () for Medicare insured patients    glipiZIDE (GLUCOTROL) 10 mg tablet    metFORMIN ER (GLUCOPHAGE XR) 500 mg tablet    metoprolol succinate (TOPROL-XL) 25 mg XL tablet    lisinopril (PRINIVIL, ZESTRIL) 20 mg tablet    albuterol (PROVENTIL HFA, VENTOLIN HFA, PROAIR HFA) 90 mcg/actuation inhaler    pravastatin (PRAVACHOL) 20 mg tablet    meloxicam (MOBIC) 7.5 mg tablet       Health Maintenance   Topic Date Due    FOOT EXAM Q1  02/05/1979    EYE EXAM RETINAL OR DILATED Q1  05/03/2018    Influenza Age 9 to Adult  08/01/2018    MEDICARE YEARLY EXAM  10/18/2018    HEMOGLOBIN A1C Q6M  01/02/2019    MICROALBUMIN Q1  05/02/2019    LIPID PANEL Q1  05/02/2019    DTaP/Tdap/Td series (2 - Td) 12/12/2026    Pneumococcal 19-64 Medium Risk  Completed       *Patient verbalized understanding and agreement with the plan. A copy of the After Visit Summary with personalized health plan was given to the patient today.

## 2018-11-20 DIAGNOSIS — I10 BENIGN HYPERTENSION WITHOUT CHF: ICD-10-CM

## 2018-11-20 DIAGNOSIS — Z76.0 MEDICATION REFILL: ICD-10-CM

## 2018-11-20 DIAGNOSIS — E78.5 DYSLIPIDEMIA: ICD-10-CM

## 2018-11-20 RX ORDER — PRAVASTATIN SODIUM 20 MG/1
20 TABLET ORAL
Qty: 90 TAB | Refills: 3 | Status: SHIPPED | OUTPATIENT
Start: 2018-11-20 | End: 2019-06-26

## 2018-11-20 RX ORDER — GLIPIZIDE 10 MG/1
10 TABLET ORAL 2 TIMES DAILY
Qty: 180 TAB | Refills: 3 | Status: SHIPPED | OUTPATIENT
Start: 2018-11-20 | End: 2020-10-06

## 2018-11-20 RX ORDER — METOPROLOL SUCCINATE 25 MG/1
25 TABLET, EXTENDED RELEASE ORAL DAILY
Qty: 90 TAB | Refills: 1 | Status: SHIPPED | OUTPATIENT
Start: 2018-11-20 | End: 2021-01-04 | Stop reason: SDUPTHER

## 2018-11-20 RX ORDER — METFORMIN HYDROCHLORIDE 500 MG/1
1000 TABLET, EXTENDED RELEASE ORAL 2 TIMES DAILY
Qty: 360 TAB | Refills: 3 | Status: SHIPPED | OUTPATIENT
Start: 2018-11-20 | End: 2020-10-06

## 2018-11-20 NOTE — TELEPHONE ENCOUNTER
Requested Prescriptions     Pending Prescriptions Disp Refills    pravastatin (PRAVACHOL) 20 mg tablet 90 Tab 3     Sig: Take 1 Tab by mouth nightly.  QUEtiapine (SEROQUEL) 100 mg tablet       Sig: Take 1 Tab by mouth nightly.  metFORMIN ER (GLUCOPHAGE XR) 500 mg tablet 360 Tab 3     Sig: Take 2 Tabs by mouth two (2) times a day.  glipiZIDE (GLUCOTROL) 10 mg tablet 60 Tab 2     Sig: Take 1 Tab by mouth two (2) times a day.  nitroglycerin (NITROSTAT) 0.4 mg SL tablet       Si Tab by SubLINGual route every five (5) minutes as needed for Chest Pain.  metoprolol succinate (TOPROL-XL) 25 mg XL tablet 90 Tab 1     Sig: Take 1 Tab by mouth daily.

## 2018-11-20 NOTE — TELEPHONE ENCOUNTER
I never prescribed the nitro and seroquel. Does he have a psychiatrist? Who prescribed the nitro before?

## 2018-11-21 RX ORDER — NITROGLYCERIN 0.4 MG/1
0.4 TABLET SUBLINGUAL
OUTPATIENT
Start: 2018-11-21

## 2018-11-21 RX ORDER — QUETIAPINE FUMARATE 100 MG/1
100 TABLET, FILM COATED ORAL
OUTPATIENT
Start: 2018-11-21

## 2018-11-21 NOTE — TELEPHONE ENCOUNTER
2 Pt Identifiers verified. Notified pt of below message. Verbalized to pt to return to original Md that prescribed these meds. Verbalized understanding.

## 2018-11-23 DIAGNOSIS — I10 BENIGN HYPERTENSION WITHOUT CHF: ICD-10-CM

## 2018-11-23 DIAGNOSIS — Z76.0 MEDICATION REFILL: ICD-10-CM

## 2018-11-23 RX ORDER — OMEPRAZOLE 40 MG/1
CAPSULE, DELAYED RELEASE ORAL
Qty: 90 CAP | Refills: 3 | Status: SHIPPED | OUTPATIENT
Start: 2018-11-23 | End: 2021-03-08

## 2018-11-23 RX ORDER — LISINOPRIL 20 MG/1
20 TABLET ORAL DAILY
Qty: 90 TAB | Refills: 3 | Status: SHIPPED | OUTPATIENT
Start: 2018-11-23 | End: 2020-10-06

## 2018-11-23 RX ORDER — ALBUTEROL SULFATE 90 UG/1
AEROSOL, METERED RESPIRATORY (INHALATION)
Qty: 3 INHALER | Refills: 3 | Status: SHIPPED | OUTPATIENT
Start: 2018-11-23 | End: 2022-03-15 | Stop reason: SDUPTHER

## 2018-11-23 NOTE — TELEPHONE ENCOUNTER
Requested Prescriptions     Pending Prescriptions Disp Refills    albuterol (PROVENTIL HFA, VENTOLIN HFA, PROAIR HFA) 90 mcg/actuation inhaler 1 Inhaler 3     Sig: Take 1-2 puffs every 4-6 hrs prn shortness of breath    lisinopril (PRINIVIL, ZESTRIL) 20 mg tablet 90 Tab 1     Sig: Take 1 Tab by mouth daily.     omeprazole (PRILOSEC) 40 mg capsule  10

## 2018-11-27 RX ORDER — QUETIAPINE FUMARATE 100 MG/1
100 TABLET, FILM COATED ORAL
OUTPATIENT
Start: 2018-11-27

## 2018-11-27 RX ORDER — NITROGLYCERIN 0.4 MG/1
0.4 TABLET SUBLINGUAL
OUTPATIENT
Start: 2018-11-27

## 2018-11-27 RX ORDER — PAROXETINE HYDROCHLORIDE 20 MG/1
20 TABLET, FILM COATED ORAL DAILY
OUTPATIENT
Start: 2018-11-27

## 2018-11-27 NOTE — TELEPHONE ENCOUNTER
Never prescribed these meds. Denied:    Requested Prescriptions     Refused Prescriptions Disp Refills    nitroglycerin (NITROSTAT) 0.4 mg SL tablet       Si Tab by SubLINGual route every five (5) minutes as needed for Chest Pain. Refused By: Barbara Gee     Reason for Refusal: other    QUEtiapine (SEROQUEL) 100 mg tablet       Sig: Take 1 Tab by mouth nightly. Refused By: Barbara Gee     Reason for Refusal: other    PARoxetine (PAXIL) 20 mg tablet       Sig: Take 1 Tab by mouth daily.      Refused By: Barbara Gee     Reason for Refusal: other

## 2018-11-27 NOTE — TELEPHONE ENCOUNTER
Paolo with [facility] is calling in to request a refill for the patient. Last OV 10/25/18, no future appt scheduled. Please send 90 day Rx's    Requested Prescriptions     Pending Prescriptions Disp Refills    nitroglycerin (NITROSTAT) 0.4 mg SL tablet       Si Tab by SubLINGual route every five (5) minutes as needed for Chest Pain.  QUEtiapine (SEROQUEL) 100 mg tablet       Sig: Take 1 Tab by mouth nightly.  PARoxetine (PAXIL) 20 mg tablet       Sig: Take 1 Tab by mouth daily.

## 2018-12-14 ENCOUNTER — HOSPITAL ENCOUNTER (OUTPATIENT)
Dept: LAB | Age: 48
Discharge: HOME | End: 2018-12-14
Payer: MEDICARE

## 2018-12-14 DIAGNOSIS — E78.5 DYSLIPIDEMIA: ICD-10-CM

## 2018-12-14 LAB
ALBUMIN SERPL-MCNC: 3.4 G/DL (ref 3.4–5)
ALBUMIN/GLOB SERPL: 1.4 {RATIO} (ref 0.8–1.7)
ALP SERPL-CCNC: 57 U/L (ref 45–117)
ALT SERPL-CCNC: 42 U/L (ref 16–61)
ANION GAP SERPL CALC-SCNC: 8 MMOL/L (ref 3–18)
AST SERPL-CCNC: 56 U/L (ref 15–37)
BILIRUB SERPL-MCNC: 0.2 MG/DL (ref 0.2–1)
BUN SERPL-MCNC: 10 MG/DL (ref 7–18)
BUN/CREAT SERPL: 10 (ref 12–20)
CALCIUM SERPL-MCNC: 8.3 MG/DL (ref 8.5–10.1)
CHLORIDE SERPL-SCNC: 108 MMOL/L (ref 100–108)
CHOLEST SERPL-MCNC: 172 MG/DL
CO2 SERPL-SCNC: 28 MMOL/L (ref 21–32)
CREAT SERPL-MCNC: 1.03 MG/DL (ref 0.6–1.3)
EST. AVERAGE GLUCOSE BLD GHB EST-MCNC: 160 MG/DL
GLOBULIN SER CALC-MCNC: 2.5 G/DL (ref 2–4)
GLUCOSE SERPL-MCNC: 101 MG/DL (ref 74–99)
HBA1C MFR BLD: 7.2 % (ref 4.2–5.6)
HDLC SERPL-MCNC: 36 MG/DL (ref 40–60)
HDLC SERPL: 4.8 {RATIO} (ref 0–5)
LDLC SERPL CALC-MCNC: 78 MG/DL (ref 0–100)
LIPID PROFILE,FLP: ABNORMAL
POTASSIUM SERPL-SCNC: 4.2 MMOL/L (ref 3.5–5.5)
PROT SERPL-MCNC: 5.9 G/DL (ref 6.4–8.2)
SODIUM SERPL-SCNC: 144 MMOL/L (ref 136–145)
TRIGL SERPL-MCNC: 290 MG/DL (ref ?–150)
TSH SERPL DL<=0.05 MIU/L-ACNC: 3.2 UIU/ML (ref 0.36–3.74)
VLDLC SERPL CALC-MCNC: 58 MG/DL

## 2018-12-14 PROCEDURE — 80053 COMPREHEN METABOLIC PANEL: CPT

## 2018-12-14 PROCEDURE — 80061 LIPID PANEL: CPT

## 2018-12-14 PROCEDURE — 84443 ASSAY THYROID STIM HORMONE: CPT

## 2018-12-14 PROCEDURE — 83036 HEMOGLOBIN GLYCOSYLATED A1C: CPT

## 2018-12-15 NOTE — PROGRESS NOTES
Please let pt know that labs were normal except:     1) HgA1C little up at 7.2. Work on diet and exercise. Make sure to take the metformin and glipizide every day. Glucose up at 101.     2) calcium low at 8.3. Increase calcium in diet. 3) AST high at 56. Has he been drinking a lot of ETOH, taking a lot tylenol ,herbals, IVDU? If so, needs to stop. 4) Trig up at 290 and needs to be 150 or less. Work on diet and exercise. Mail low chol diet information. Start flax seed oil one a day,. 5) HDL low at 36. Exercise will bring this up and stopping smoking.

## 2018-12-17 ENCOUNTER — TELEPHONE (OUTPATIENT)
Dept: INTERNAL MEDICINE CLINIC | Age: 48
End: 2018-12-17

## 2018-12-17 NOTE — TELEPHONE ENCOUNTER
----- Message from Garrett Villegas MD sent at 12/15/2018 11:28 AM EST -----  Please let pt know that labs were normal except:     1) HgA1C little up at 7.2. Work on diet and exercise. Make sure to take the metformin and glipizide every day. Glucose up at 101.     2) calcium low at 8.3. Increase calcium in diet. 3) AST high at 56. Has he been drinking a lot of ETOH, taking a lot tylenol ,herbals, IVDU? If so, needs to stop. 4) Trig up at 290 and needs to be 150 or less. Work on diet and exercise. Mail low chol diet information. Start flax seed oil one a day,. 5) HDL low at 36. Exercise will bring this up and stopping smoking.

## 2018-12-21 ENCOUNTER — TELEPHONE (OUTPATIENT)
Dept: INTERNAL MEDICINE CLINIC | Age: 48
End: 2018-12-21

## 2018-12-21 NOTE — TELEPHONE ENCOUNTER
Patient call back requesting his referral order to go to     [facility]  Address: [address]   Phone: [phone]

## 2018-12-21 NOTE — TELEPHONE ENCOUNTER
Patient requesting a referral to see a gastroenterology   for elevated liver patient do not have a office preference     * Pt stated he been seen by his pcp regarding his liver

## 2018-12-22 ENCOUNTER — TELEPHONE (OUTPATIENT)
Dept: INTERNAL MEDICINE CLINIC | Age: 48
End: 2018-12-22

## 2018-12-22 DIAGNOSIS — R79.89 ELEVATED LFTS: Primary | ICD-10-CM

## 2018-12-22 NOTE — TELEPHONE ENCOUNTER
Britney stopped by the office to clarify the reason for the RF request to GI/  States he needs to see a liver doctor based on his most recent lab results and elevated liver function tests.

## 2018-12-30 NOTE — TELEPHONE ENCOUNTER
His LFT's were only slightly elevated and it was just one of them. He just needs to recheck the LFT's. He does not need to see GI/liver specialist at this time.

## 2019-02-01 ENCOUNTER — TELEPHONE (OUTPATIENT)
Dept: INTERNAL MEDICINE CLINIC | Age: 49
End: 2019-02-01

## 2019-02-01 DIAGNOSIS — Z12.11 COLON CANCER SCREENING: Primary | ICD-10-CM

## 2019-02-01 NOTE — TELEPHONE ENCOUNTER
Referral generated in [facility]. If he does not hear from them in 1 week, he should give them a call.      Dr. Michael Iverson       [address] [address],[address] [place] [id number]          Phone:  Fax:      [phone]

## 2019-02-13 DIAGNOSIS — Z12.11 COLON CANCER SCREENING: Primary | ICD-10-CM

## 2019-06-16 ENCOUNTER — PATIENT MESSAGE (OUTPATIENT)
Dept: CARDIOLOGY CLINIC | Age: 49
End: 2019-06-16

## 2019-06-16 DIAGNOSIS — I25.10 CORONARY ARTERY DISEASE INVOLVING NATIVE CORONARY ARTERY OF NATIVE HEART WITHOUT ANGINA PECTORIS: Primary | ICD-10-CM

## 2019-06-19 ENCOUNTER — OFFICE VISIT (OUTPATIENT)
Dept: CARDIOLOGY CLINIC | Age: 49
End: 2019-06-19

## 2019-06-19 VITALS
SYSTOLIC BLOOD PRESSURE: 133 MMHG | HEIGHT: 69 IN | BODY MASS INDEX: 31.7 KG/M2 | WEIGHT: 214 LBS | DIASTOLIC BLOOD PRESSURE: 79 MMHG | HEART RATE: 83 BPM

## 2019-06-19 DIAGNOSIS — E11.9 TYPE 2 DIABETES MELLITUS WITHOUT COMPLICATION, WITHOUT LONG-TERM CURRENT USE OF INSULIN (HCC): ICD-10-CM

## 2019-06-19 DIAGNOSIS — E78.5 HYPERLIPIDEMIA, UNSPECIFIED HYPERLIPIDEMIA TYPE: ICD-10-CM

## 2019-06-19 DIAGNOSIS — I11.9 HYPERTENSIVE LEFT VENTRICULAR HYPERTROPHY, WITHOUT HEART FAILURE: ICD-10-CM

## 2019-06-19 DIAGNOSIS — I25.119 CORONARY ARTERY DISEASE INVOLVING NATIVE CORONARY ARTERY OF NATIVE HEART WITH ANGINA PECTORIS (HCC): Primary | ICD-10-CM

## 2019-06-19 DIAGNOSIS — R07.9 CHEST PAIN, UNSPECIFIED TYPE: ICD-10-CM

## 2019-06-19 RX ORDER — ASPIRIN 81 MG/1
81 TABLET ORAL DAILY
Qty: 100 TAB | Refills: 1 | Status: SHIPPED | OUTPATIENT
Start: 2019-06-19 | End: 2021-05-17

## 2019-06-19 RX ORDER — NITROGLYCERIN 0.4 MG/1
0.4 TABLET SUBLINGUAL
Qty: 25 TAB | Refills: 1 | Status: SHIPPED | OUTPATIENT
Start: 2019-06-19 | End: 2022-03-15 | Stop reason: SDUPTHER

## 2019-06-19 NOTE — PROGRESS NOTES
1. Have you been to the ER, urgent care clinic since your last visit? Hospitalized since your last visit? No    2. Have you seen or consulted any other health care providers outside of the [address] [place] [address] since your last visit? Include any pap smears or colon screening.  Yes Where: Gastroenterology Reason for visit: Routine

## 2019-06-19 NOTE — PROGRESS NOTES
HISTORY OF PRESENT ILLNESS  Kalyani Gee is a 47 y.o. male. Patient is here for preoperative cardiac assessment. He had abnormal EKG suggestive of ischemia. Patient is asymptomatic with no complaint of  dizziness, palpitation, edema, or other associated symptoms. Patient reports complaint of shortness of breath on exertion. Denies any orthopnea PND or peripheral edema    Hypertension   The history is provided by the patient. This is a chronic problem. The problem occurs constantly. The problem has not changed since onset. Pertinent negatives include no chest pain, no abdominal pain, no headaches and no shortness of breath. Abnormal Cardiac Test   The history is provided by the patient. This is a new problem. The problem occurs constantly. The problem has not changed since onset. Pertinent negatives include no chest pain, no abdominal pain, no headaches and no shortness of breath. Nothing aggravates the symptoms. Nothing relieves the symptoms. Chest Pain    The history is provided by the patient. This is a new problem. The current episode started more than 1 week ago. The problem has been gradually worsening. The problem occurs every several days. The pain is associated with exertion. The pain is present in the substernal region. The quality of the pain is described as pressure-like and tightness. The pain does not radiate. Pertinent negatives include no abdominal pain, no claudication, no cough, no dizziness, no fever, no headaches, no hemoptysis, no nausea, no orthopnea, no palpitations, no PND, no shortness of breath, no sputum production, no vomiting and no weakness. Review of Systems   Constitutional: Negative for chills and fever. HENT: Negative for nosebleeds. Eyes: Negative for blurred vision and double vision. Respiratory: Negative for cough, hemoptysis, sputum production, shortness of breath and wheezing.     Cardiovascular: Negative for chest pain, palpitations, orthopnea, claudication, leg swelling and PND. Gastrointestinal: Negative for abdominal pain, heartburn, nausea and vomiting. Musculoskeletal: Negative for myalgias. Skin: Negative for rash. Neurological: Negative for dizziness, weakness and headaches. Endo/Heme/Allergies: Does not bruise/bleed easily. Family History   Problem Relation Age of Onset    Anemia Mother     Cancer Father         prostate    Cancer Brother     Diabetes Brother        Past Medical History:   Diagnosis Date    Abnormal EKG 7/9/2018    Chronic ST-T changes noted likely from LVH. Prior cardiac workup includes stress test and cardiac catheterization. Reviewed    Advance directive discussed with patient 05/03/2017    Arthritis     left knee    Asthma     CAD (coronary artery disease)     heart attack 6/2012, Being followed by Dr. Ortiz Alt Cocaine use 2017    Depression     Diabetes ([address]     DVT (deep venous thrombosis) (HCC)     left leg in the past (Pt was on coumadin)    GERD (gastroesophageal reflux disease)     Hyperlipidemia 7/9/2018    cramps with pravastatin. Consider alternate therapy after surgery    Hypertension     Hypertensive heart disease without heart failure 7/9/2018    Blood pressure control. LVH on EKG.  Hypertensive left ventricular hypertrophy, without heart failure 7/9/2018    Mild LVH on last echo.  Medication refill 7/9/2018    Pre-operative cardiovascular examination 7/9/2018    Knee surgery. Stable cardiac status.   Okay to proceed with moderate cardiac risk    Psychiatric disorder     depression and schziophenia    Schizophrenia ([address]     Will be seeing [facility]    Type 2 diabetes mellitus without complication, without long-term current use of insulin ([address] 7/9/2018    On treatment    Unspecified sleep apnea     cpap machine,Will be seeing neurologist       Past Surgical History:   Procedure Laterality Date    ABDOMEN SURGERY [address] UNLISTED      HX COLONOSCOPY      HX HEART CATHETERIZATION  2014    cardiac cath - normal coronaries    HX HERNIA REPAIR      HX KNEE REPLACEMENT      left TKR    HX ORTHOPAEDIC      L knee 1987, 1996    HX OTHER SURGICAL      eye sx at 2 yrs old       Social History     Tobacco Use    Smoking status: Current Every Day Smoker     Packs/day: 0.25     Types: Cigars    Smokeless tobacco: Never Used    Tobacco comment: black and mild per day   Substance Use Topics    Alcohol use: Yes     Frequency: 2-4 times a month     Drinks per session: 1 or 2     Binge frequency: Never     Comment: \"a little\"/ occ       Allergies   Allergen Reactions    Iodine Anaphylaxis    Norco [Hydrocodone-Acetaminophen] Nausea and Vomiting     Gi distress    Shellfish Derived Swelling and Angioedema     Other reaction(s): anaphylaxis/angioedema  SHRIMP AND CRAB       Prior to Admission medications    Medication Sig Start Date End Date Taking? Authorizing Provider   nitroglycerin (NITROSTAT) 0.4 mg SL tablet 1 Tab by SubLINGual route every five (5) minutes as needed for Chest Pain. 6/19/19  Yes Fausto Spears MD   aspirin delayed-release 81 mg tablet Take 1 Tab by mouth daily. 6/19/19  Yes Fausto Spears MD   albuterol (PROVENTIL HFA, VENTOLIN HFA, PROAIR HFA) 90 mcg/actuation inhaler Take 1-2 puffs every 4-6 hrs prn shortness of breath 11/23/18  Yes Tony Howard MD   lisinopril (PRINIVIL, ZESTRIL) 20 mg tablet Take 1 Tab by mouth daily. 11/23/18  Yes Tony Howard MD   omeprazole (PRILOSEC) 40 mg capsule Take one po daily as needed for GERD 11/23/18  Yes Tony Howard MD   pravastatin (PRAVACHOL) 20 mg tablet Take 1 Tab by mouth nightly. 11/20/18  Yes Tony Howard MD   metFORMIN ER (GLUCOPHAGE XR) 500 mg tablet Take 2 Tabs by mouth two (2) times a day. 11/20/18  Yes Tony Howard MD   glipiZIDE (GLUCOTROL) 10 mg tablet Take 1 Tab by mouth two (2) times a day.  11/20/18  Yes Parris Howard MD   metoprolol succinate (TOPROL-XL) 25 mg XL tablet Take 1 Tab by mouth daily. 11/20/18  Yes Tony Howard MD   meloxicam (MOBIC) 7.5 mg tablet Take 1-2 po daily prn for pain 10/25/18  Yes Tony Howard MD   glucose blood VI test strips (ASCENSIA AUTODISC VI, ONE TOUCH ULTRA TEST VI) strip Check fasting sugars daily before breakfast 7/11/18  Yes Tony Howard MD   Lancets misc Check fasting sugars daily before breakfast 7/11/18  Yes Tony Howard MD   Blood-Glucose Meter monitoring kit Check fasting sugars daily before breakfast 7/11/18  Yes Tony Howard MD   PARoxetine (PAXIL) 20 mg tablet Take 20 mg by mouth daily. 4/30/18  Yes Provider, Historical   QUEtiapine (SEROQUEL) 100 mg tablet Take 100 mg by mouth nightly. 4/30/18  Yes Provider, Historical   tiotropium (SPIRIVA) 18 mcg inhalation capsule Take 1 Cap by inhalation daily. 10/12/16  Yes Provider, Historical   montelukast (SINGULAIR) 10 mg tablet Take 10 mg by mouth daily. Yes Other, MD Terry   fluticasone-salmeterol (ADVAIR DISKUS) 500-50 mcg/dose diskus inhaler Take 1 Puff by inhalation every twelve (12) hours. Yes Other, MD Terry   lidocaine (XYLOCAINE) 5 % ointment APPLY THIN FILM RUB EXTERNALLY TWICE DAILY IN THE MORNING AND EVENING 12/27/16   Provider, Historical         Visit Vitals  /79   Pulse 83   Ht 5' 9\" (1.753 m)   Wt 97.1 kg (214 lb)   BMI 31.60 kg/m²       Physical Exam   Constitutional: He is oriented to person, place, and time. He appears well-developed and well-nourished. HENT:   Head: Normocephalic and atraumatic. Eyes: Conjunctivae are normal.   Neck: Neck supple. No JVD present. No tracheal deviation present. No thyromegaly present. Cardiovascular: Normal rate, regular rhythm and normal heart sounds. Exam reveals no gallop and no friction rub. No murmur heard. Pulmonary/Chest: Breath sounds normal. No respiratory distress. He has no wheezes. He has no rales. He exhibits no tenderness. Abdominal: Soft. There is no tenderness. Musculoskeletal: He exhibits no edema. Neurological: He is alert and oriented to person, place, and time. Skin: Skin is warm and dry. Psychiatric: He has a normal mood and affect. I have personally reviewed patient's records available from hospital and other providers and incorporated findings in patient care. 2014  IMPRESSION  1. Angiographically normal coronary arteries  2. Normal left ventricular systolic function with an ejection   fractio of 65%  3. Normal left sided filling pressures  4. No evidence of Aortic stenosis or Mitral regurgitation  Stress test    TECHNIQUE:  Following the resting injection of 8.5 mCi of technetium-99m-sestamibi and at stress 0.4 mg of intravenous Lexiscan and 30.5 mCi of technetium-99m-sestamibi, resting and stress images of the left ventricular myocardium were obtained in the SPECT mode. FINDINGS:  Review of stress and rest nuclear SPECT images show no pathologic perfusion defects noted. This is therefore felt to represent a study with no diagnostic abnormalities and no definite evidence for infarct or ischemia. Review of gated images shows a normal left ventricular ejection fraction of 57%. There are no segmental wall motion abnormalities seen. 2014: Echocardiogram   LOW NORMAL LEFT VENTRICULAR SYSTOLIC FUNCTION; EJECTION FRACTION ESTIMATED AT 50%. 2. MILD DIASTOLIC DYSFUNCTION. 3. MILD CONCENTRIC LEFT VENTRICULAR HYPERTROPHY. 4. MILDLY DILATED RIGHT HEART WITH REDUCED SYSTOLIC FUNCTION. 5. NO HEMODYNAMICALLY SIGNIFICANT VALVULAR PATHOLOGY. 6. NORMAL PULMONARY ARTERY PRESSURE ESTIMATED AT 28 MMHG. 7. NO MASSES, SHUNTS OR THROMBI SEEN; NEGATIVE BUBBLE STUDY. I Have personally reviewed recent relevant labs available and discussed with patient  I have personally reviewed patients ekg done at other facility. Mr. Juvenal Harper has a reminder for a \"due or due soon\" health maintenance.  I have asked that he contact his primary care provider for follow-up on this health maintenance. IMPRESSION; 7/2018  1. Abnormal scan  2. Fixed inferobasal perfusion defect which could be related to small area of scarring although attenuation cannot be entirely ruled out. 3.  Global left ventricular hypokinesis and reduced ejection fraction which could be related to technical limitations. Would recommend correlation with echocardiogram exam.  4.  Medium risk can do to reduce ejection fraction on these nuclear stress test.     Interpretation Summary 8/2018  · Calculated left ventricular ejection fraction is 60%. Left ventricular mild concentric hypertrophy. Normal left ventricular wall motion, no regional wall motion abnormality noted. · Mild mitral annular calcification. Trace mitral valve regurgitation. Assessment         ICD-10-CM ICD-9-CM    1. Coronary artery disease involving native coronary artery of native heart with angina pectoris ([facility]) I25.119 414.01 CBC WITH AUTOMATED DIFF     267.7 METABOLIC PANEL, BASIC      PROTHROMBIN TIME + INR      XR CHEST PA LAT      CARDIAC CATHETERIZATION      HEPATIC FUNCTION PANEL      LIPID PANEL    Recent increasing chest pain class III use of nitroglycerin. Abnormal stress test in past will proceed with cath. Abnormal EKG possible ischemia   2. Chest pain, unspecified type R07.9 786.50 AMB POC EKG ROUTINE W/ 12 LEADS, INTER & REP      CBC WITH AUTOMATED DIFF      METABOLIC PANEL, BASIC      PROTHROMBIN TIME + INR      XR CHEST PA LAT      CARDIAC CATHETERIZATION      HEPATIC FUNCTION PANEL      LIPID PANEL    Possible atypical angina   3. Hypertensive left ventricular hypertrophy, without heart failure I11.9 402.90     Control blood pressure continue treatment   4. Type 2 diabetes mellitus without complication, without long-term current use of insulin ([facility]) E11.9 250.00     On treatment lab with PCP   5.  Hyperlipidemia, unspecified hyperlipidemia type E78.5 272.4     Continue treatment 8/2018Current and old EKGs are reviewed. Old cardiac testing reviewed. Likely nonspecific hypertensive disease changes on EKG but with complaint of shortness of breath we will proceed with stress test and echocardiogram exam to rule out any evidence of ischemia and assess LV function. 6/2019  Recurrent chest pain possible angina abnormal stress test in past.  Will proceed with cardiac catheterization. THE PATIENT UNDERSTANDS THAT ALTHOUGH RARE, SEVERE  UNEXPECTED COMPLICATIONS CAN OCCUR WITH EACH TYPE OF CARDIAC CATH PROCEDURE. THESE RISKS INCLUDE,  BUT ARE NOT LIMITED TO: ALLERGIC REACTION, INFECTION, BLEEDING, BLOOD VESSEL INJURY,   KIDNEY INJURY FROM X-RAY DYE, PUNCTURE OF THE HEART/LUNGS,   EMERGENT OPEN HEART SURGERY, HEART ATTACK, STROKE, CARDIAC  ARREST OR DEATH OR NEED FOR EMERGENCY CARDIAC BYPASS SURGERY       Medications Discontinued During This Encounter   Medication Reason    nitroglycerin (NITROSTAT) 0.4 mg SL tablet Reorder    ASPIRIN PO Reorder       Orders Placed This Encounter    XR CHEST PA LAT     Standing Status:   Future     Standing Expiration Date:   7/19/2020     Order Specific Question:   Reason for Exam     Answer:   cad     Order Specific Question:   Is Patient Allergic to Contrast Dye?      Answer:   No    CBC WITH AUTOMATED DIFF     Standing Status:   Future     Standing Expiration Date:   8/99/0899    METABOLIC PANEL, BASIC     Standing Status:   Future     Standing Expiration Date:   7/19/2019    PROTHROMBIN TIME + INR     Standing Status:   Future     Standing Expiration Date:   7/19/2019    HEPATIC FUNCTION PANEL     Standing Status:   Future     Standing Expiration Date:   12/18/2019    LIPID PANEL     Standing Status:   Future     Standing Expiration Date:   12/18/2019    CARDIAC CATHETERIZATION     Standing Status:   Future     Standing Expiration Date:   12/19/2019     Order Specific Question:   Reason for Exam:     Answer:   cp/cad    AMB POC EKG ROUTINE W/ 12 LEADS, INTER & REP     Order Specific Question:   Reason for Exam:     Answer:   Chest Pain    nitroglycerin (NITROSTAT) 0.4 mg SL tablet     Si Tab by SubLINGual route every five (5) minutes as needed for Chest Pain. Dispense:  25 Tab     Refill:  1    aspirin delayed-release 81 mg tablet     Sig: Take 1 Tab by mouth daily.      Dispense:  100 Tab     Refill:  1

## 2019-06-25 ENCOUNTER — TELEPHONE (OUTPATIENT)
Dept: CARDIOLOGY CLINIC | Age: 49
End: 2019-06-25

## 2019-06-25 ENCOUNTER — HOSPITAL ENCOUNTER (OUTPATIENT)
Dept: LAB | Age: 49
Discharge: HOME | End: 2019-06-25
Payer: MEDICARE

## 2019-06-25 ENCOUNTER — HOSPITAL ENCOUNTER (OUTPATIENT)
Dept: GENERAL RADIOLOGY | Age: 49
Discharge: HOME | End: 2019-06-25
Payer: MEDICARE

## 2019-06-25 DIAGNOSIS — I25.119 CORONARY ARTERY DISEASE INVOLVING NATIVE CORONARY ARTERY OF NATIVE HEART WITH ANGINA PECTORIS (HCC): ICD-10-CM

## 2019-06-25 DIAGNOSIS — R07.9 CHEST PAIN, UNSPECIFIED TYPE: ICD-10-CM

## 2019-06-25 LAB
ALBUMIN SERPL-MCNC: 4.1 G/DL (ref 3.4–5)
ALBUMIN/GLOB SERPL: 1.4 {RATIO} (ref 0.8–1.7)
ALP SERPL-CCNC: 73 U/L (ref 45–117)
ALT SERPL-CCNC: 38 U/L (ref 16–61)
ANION GAP SERPL CALC-SCNC: 7 MMOL/L (ref 3–18)
AST SERPL-CCNC: 21 U/L (ref 15–37)
BASOPHILS # BLD: 0 K/UL (ref 0–0.1)
BASOPHILS NFR BLD: 1 % (ref 0–2)
BILIRUB DIRECT SERPL-MCNC: 0.1 MG/DL (ref 0–0.2)
BILIRUB SERPL-MCNC: 0.4 MG/DL (ref 0.2–1)
BUN SERPL-MCNC: 17 MG/DL (ref 7–18)
BUN/CREAT SERPL: 15 (ref 12–20)
CALCIUM SERPL-MCNC: 9.2 MG/DL (ref 8.5–10.1)
CHLORIDE SERPL-SCNC: 109 MMOL/L (ref 100–108)
CHOLEST SERPL-MCNC: 182 MG/DL
CO2 SERPL-SCNC: 26 MMOL/L (ref 21–32)
CREAT SERPL-MCNC: 1.16 MG/DL (ref 0.6–1.3)
DIFFERENTIAL METHOD BLD: NORMAL
EOSINOPHIL # BLD: 0.2 K/UL (ref 0–0.4)
EOSINOPHIL NFR BLD: 3 % (ref 0–5)
ERYTHROCYTE [DISTWIDTH] IN BLOOD BY AUTOMATED COUNT: 13.6 % (ref 11.6–14.5)
GLOBULIN SER CALC-MCNC: 2.9 G/DL (ref 2–4)
GLUCOSE SERPL-MCNC: 97 MG/DL (ref 74–99)
HCT VFR BLD AUTO: 44.6 % (ref 36–48)
HDLC SERPL-MCNC: 40 MG/DL (ref 40–60)
HDLC SERPL: 4.6 {RATIO} (ref 0–5)
HGB BLD-MCNC: 14.6 G/DL (ref 13–16)
INR PPP: 1 (ref 0.8–1.2)
LDLC SERPL CALC-MCNC: 101.4 MG/DL (ref 0–100)
LIPID PROFILE,FLP: ABNORMAL
LYMPHOCYTES # BLD: 2 K/UL (ref 0.9–3.6)
LYMPHOCYTES NFR BLD: 35 % (ref 21–52)
MCH RBC QN AUTO: 28.4 PG (ref 24–34)
MCHC RBC AUTO-ENTMCNC: 32.7 G/DL (ref 31–37)
MCV RBC AUTO: 86.8 FL (ref 74–97)
MONOCYTES # BLD: 0.4 K/UL (ref 0.05–1.2)
MONOCYTES NFR BLD: 6 % (ref 3–10)
NEUTS SEG # BLD: 3.3 K/UL (ref 1.8–8)
NEUTS SEG NFR BLD: 55 % (ref 40–73)
PLATELET # BLD AUTO: 266 K/UL (ref 135–420)
PMV BLD AUTO: 9.7 FL (ref 9.2–11.8)
POTASSIUM SERPL-SCNC: 4.5 MMOL/L (ref 3.5–5.5)
PROT SERPL-MCNC: 7 G/DL (ref 6.4–8.2)
PROTHROMBIN TIME: 12.6 SEC (ref 11.5–15.2)
RBC # BLD AUTO: 5.14 M/UL (ref 4.7–5.5)
SODIUM SERPL-SCNC: 142 MMOL/L (ref 136–145)
TRIGL SERPL-MCNC: 203 MG/DL (ref ?–150)
VLDLC SERPL CALC-MCNC: 40.6 MG/DL
WBC # BLD AUTO: 5.9 K/UL (ref 4.6–13.2)

## 2019-06-25 PROCEDURE — 85610 PROTHROMBIN TIME: CPT

## 2019-06-25 PROCEDURE — 80048 BASIC METABOLIC PNL TOTAL CA: CPT

## 2019-06-25 PROCEDURE — 80076 HEPATIC FUNCTION PANEL: CPT

## 2019-06-25 PROCEDURE — 71046 X-RAY EXAM CHEST 2 VIEWS: CPT

## 2019-06-25 PROCEDURE — 85025 COMPLETE CBC W/AUTO DIFF WBC: CPT

## 2019-06-25 PROCEDURE — 80061 LIPID PANEL: CPT

## 2019-06-25 PROCEDURE — 36415 COLL VENOUS BLD VENIPUNCTURE: CPT

## 2019-06-25 NOTE — TELEPHONE ENCOUNTER
Patient called in concern to x-ray results, no answer at this time. Message left informing patient that the doctor has reviewed x-ray, noting no significant abnormalities. Patient asked to call the office if any questions or concerns.

## 2019-06-25 NOTE — TELEPHONE ENCOUNTER
----- Message from Hamida Schafer MD sent at 6/25/2019 10:44 AM EDT -----  Lab/test  reviewed  No significant abnormality

## 2019-06-26 DIAGNOSIS — E78.5 HYPERLIPIDEMIA, UNSPECIFIED HYPERLIPIDEMIA TYPE: Primary | ICD-10-CM

## 2019-06-26 RX ORDER — ATORVASTATIN CALCIUM 20 MG/1
20 TABLET, FILM COATED ORAL DAILY
Qty: 30 TAB | Refills: 6 | Status: SHIPPED | OUTPATIENT
Start: 2019-06-26 | End: 2020-07-29

## 2019-06-26 NOTE — TELEPHONE ENCOUNTER
Patient called in concern to lab results received and reviewed. Patient made aware of lab results, per Dr. John Cannon written order patient to be switched from pravastatin and to start Lipitor 20 mg daily. Repeat hepatic and lipid panel in six weeks. Patient verbalized understanding of information; reminded to call the office if any questions or concerns. Per Dr. John Cannon written order Lipitor 20 mg ordered, hepatic and lipid panel ordered.

## 2019-06-26 NOTE — TELEPHONE ENCOUNTER
----- Message from Nimco Oropeza MD sent at 6/25/2019 12:30 PM EDT -----  LDL elevated. Change pravastatin to atorvastatin 20 mg a day.   Lipid LFT 6-week

## 2019-06-28 ENCOUNTER — HOSPITAL ENCOUNTER (OUTPATIENT)
Age: 49
Setting detail: OUTPATIENT SURGERY
Discharge: HOME | End: 2019-06-28
Attending: INTERNAL MEDICINE | Admitting: INTERNAL MEDICINE

## 2019-06-28 VITALS — HEIGHT: 69 IN | BODY MASS INDEX: 31.6 KG/M2

## 2019-06-28 DIAGNOSIS — I25.110 ATHEROSCLEROTIC HEART DISEASE OF NATIVE CORONARY ARTERY WITH UNSTABLE ANGINA PECTORIS (HCC): ICD-10-CM

## 2019-07-01 ENCOUNTER — TELEPHONE (OUTPATIENT)
Dept: CARDIOLOGY CLINIC | Age: 49
End: 2019-07-01

## 2019-07-01 DIAGNOSIS — I25.119 CORONARY ARTERY DISEASE INVOLVING NATIVE CORONARY ARTERY OF NATIVE HEART WITH ANGINA PECTORIS (HCC): Primary | ICD-10-CM

## 2019-07-01 NOTE — TELEPHONE ENCOUNTER
PT stated his cath was canceled last week due to allergies to shellfish and our office was to call in medication and reschedule cath. Please advise.

## 2019-07-01 NOTE — TELEPHONE ENCOUNTER
Needs premedication for iodine allergy-- talk to [facility] and get instructions

## 2019-07-02 RX ORDER — DIPHENHYDRAMINE HCL 25 MG
25 TABLET ORAL 2 TIMES DAILY
Qty: 3 TAB | Refills: 0 | Status: SHIPPED | OUTPATIENT
Start: 2019-07-02 | End: 2019-07-04

## 2019-07-02 RX ORDER — PREDNISONE 20 MG/1
40 TABLET ORAL 2 TIMES DAILY
Qty: 6 TAB | Refills: 0 | Status: SHIPPED | OUTPATIENT
Start: 2019-07-02 | End: 2019-07-05

## 2019-07-02 NOTE — TELEPHONE ENCOUNTER
Rx sent for Prednisone 40 mg and Benadryl 25 mg. Please let him know to start day before procedure. Take BID day before procedure and last dose of both medications morning of procedure.   Thanks

## 2019-08-11 DIAGNOSIS — Z76.0 MEDICATION REFILL: ICD-10-CM

## 2019-08-12 RX ORDER — METOPROLOL SUCCINATE 25 MG/1
TABLET, EXTENDED RELEASE ORAL
Qty: 90 TAB | Refills: 1 | Status: SHIPPED | OUTPATIENT
Start: 2019-08-12 | End: 2020-10-06 | Stop reason: SDUPTHER

## 2019-09-04 ENCOUNTER — OFFICE VISIT (OUTPATIENT)
Dept: INTERNAL MEDICINE CLINIC | Age: 49
End: 2019-09-04

## 2019-09-04 VITALS
TEMPERATURE: 98.8 F | WEIGHT: 213 LBS | DIASTOLIC BLOOD PRESSURE: 86 MMHG | BODY MASS INDEX: 31.55 KG/M2 | OXYGEN SATURATION: 95 % | RESPIRATION RATE: 17 BRPM | SYSTOLIC BLOOD PRESSURE: 128 MMHG | HEART RATE: 76 BPM | HEIGHT: 69 IN

## 2019-09-04 DIAGNOSIS — I10 BENIGN HYPERTENSION WITHOUT CHF: ICD-10-CM

## 2019-09-04 DIAGNOSIS — E11.9 DIABETES MELLITUS, STABLE (HCC): Primary | ICD-10-CM

## 2019-09-04 NOTE — PROGRESS NOTES
Chief Complaint   Patient presents with    Medication Evaluation    Gas       HPI:     Meli Donald is a 47 y.o.  male with history of type 2 DM and DVT here for the above complaint. Type 2 DM: on average: 160's-200's. Sometimes in the 90's. He has chest pain and shortness of breath sometimes. She uses albuterol which helps. He takes prn nitroglycerin. He has not had chest pain not as often. Abdominal pain: diarrhea for a while. This has been going on since taking the metformin. He wants to stop the metformin and switch to victoza. He denies any headaches or dizziness. Past Medical History:   Diagnosis Date    Abnormal EKG 7/9/2018    Chronic ST-T changes noted likely from LVH. Prior cardiac workup includes stress test and cardiac catheterization. Reviewed    Advance directive discussed with patient 05/03/2017    Arthritis     left knee    Asthma     CAD (coronary artery disease)     heart attack 6/2012, Being followed by Dr. Kandi Kingsley Cocaine use 2017    Depression     Diabetes ([address]     DVT (deep venous thrombosis) (HCC)     left leg in the past (Pt was on coumadin)    GERD (gastroesophageal reflux disease)     Hyperlipidemia 7/9/2018    cramps with pravastatin. Consider alternate therapy after surgery    Hypertension     Hypertensive heart disease without heart failure 7/9/2018    Blood pressure control. LVH on EKG.  Hypertensive left ventricular hypertrophy, without heart failure 7/9/2018    Mild LVH on last echo.  Medication refill 7/9/2018    Pre-operative cardiovascular examination 7/9/2018    Knee surgery. Stable cardiac status.   Okay to proceed with moderate cardiac risk    Psychiatric disorder     depression and schziophenia    Schizophrenia ([address]     Will be seeing [employer]    Type 2 diabetes mellitus without complication, without long-term current use of insulin ([address] 7/9/2018    On treatment    Unspecified sleep apnea     cpap machine,Will be seeing neurologist     Past Surgical History:   Procedure Laterality Date    ABDOMEN SURGERY PROC UNLISTED      HX COLONOSCOPY      HX HEART CATHETERIZATION  2014    cardiac cath - normal coronaries    HX HERNIA REPAIR      HX KNEE REPLACEMENT      left TKR    HX ORTHOPAEDIC      L knee 1987, 1996    HX OTHER SURGICAL      eye sx at 2 yrs old     Current Outpatient Medications   Medication Sig    liraglutide (VICTOZA) 0.6 mg/0.1 mL (18 mg/3 mL) pnij Take 0.6mg subcutaneously once a week    metoprolol succinate (TOPROL-XL) 25 mg XL tablet TAKE 1 TABLET BY MOUTH ONCE DAILY    atorvastatin (LIPITOR) 20 mg tablet Take 1 Tab by mouth daily.  nitroglycerin (NITROSTAT) 0.4 mg SL tablet 1 Tab by SubLINGual route every five (5) minutes as needed for Chest Pain.  aspirin delayed-release 81 mg tablet Take 1 Tab by mouth daily.  albuterol (PROVENTIL HFA, VENTOLIN HFA, PROAIR HFA) 90 mcg/actuation inhaler Take 1-2 puffs every 4-6 hrs prn shortness of breath    lisinopril (PRINIVIL, ZESTRIL) 20 mg tablet Take 1 Tab by mouth daily.  omeprazole (PRILOSEC) 40 mg capsule Take one po daily as needed for GERD    metFORMIN ER (GLUCOPHAGE XR) 500 mg tablet Take 2 Tabs by mouth two (2) times a day.  glipiZIDE (GLUCOTROL) 10 mg tablet Take 1 Tab by mouth two (2) times a day.  metoprolol succinate (TOPROL-XL) 25 mg XL tablet Take 1 Tab by mouth daily.  meloxicam (MOBIC) 7.5 mg tablet Take 1-2 po daily prn for pain    glucose blood VI test strips (ASCENSIA AUTODISC VI, ONE TOUCH ULTRA TEST VI) strip Check fasting sugars daily before breakfast    Lancets misc Check fasting sugars daily before breakfast    Blood-Glucose Meter monitoring kit Check fasting sugars daily before breakfast    PARoxetine (PAXIL) 20 mg tablet Take 20 mg by mouth daily.  QUEtiapine (SEROQUEL) 100 mg tablet Take 100 mg by mouth nightly.     lidocaine (XYLOCAINE) 5 % ointment APPLY THIN FILM RUB EXTERNALLY TWICE DAILY IN THE MORNING AND EVENING    tiotropium (SPIRIVA) 18 mcg inhalation capsule Take 1 Cap by inhalation daily.  montelukast (SINGULAIR) 10 mg tablet Take 10 mg by mouth daily.  fluticasone-salmeterol (ADVAIR DISKUS) 500-50 mcg/dose diskus inhaler Take 1 Puff by inhalation every twelve (12) hours. No current facility-administered medications for this visit. Health Maintenance   Topic Date Due    FOOT EXAM Q1  02/05/1979    EYE EXAM RETINAL OR DILATED  02/05/1979    Shingrix Vaccine Age 50> (1 of 2) 02/05/2019    MICROALBUMIN Q1  05/02/2019    HEMOGLOBIN A1C Q6M  06/14/2019    Influenza Age 9 to Adult  08/01/2019    MEDICARE YEARLY EXAM  10/26/2019    LIPID PANEL Q1  06/25/2020    COLONOSCOPY  03/05/2024    DTaP/Tdap/Td series (2 - Td) 12/12/2026    Pneumococcal 0-64 years  Completed     Immunization History   Administered Date(s) Administered    Influenza Vaccine 09/01/2014, 10/27/2014    Influenza Vaccine (Quad) PF 10/25/2018    Pneumococcal Polysaccharide (PPSV-23) 10/31/2013, 11/04/2014     No LMP for male patient. Allergies and Intolerances: Allergies   Allergen Reactions    Iodine Anaphylaxis    Norco [Hydrocodone-Acetaminophen] Nausea and Vomiting     Gi distress    Shellfish Derived Swelling and Angioedema     Other reaction(s): anaphylaxis/angioedema  SHRIMP AND CRAB    Metformin Diarrhea       Family History:   Family History   Problem Relation Age of Onset    Anemia Mother     Cancer Father         prostate    Cancer Brother     Diabetes Brother        Social History:   He  reports that he has been smoking cigars. He has been smoking about 0.25 packs per day. He has never used smokeless tobacco.  He  reports that he drinks alcohol.           Objective:   Physical exam:   Visit Vitals  /86 (BP 1 Location: Left arm, BP Patient Position: Sitting)   Pulse 76   Temp 98.8 °F (37.1 °C) (Oral)   Resp 17   Ht 5' 9\" (1.753 m)   Wt 213 lb (96.6 kg)   SpO2 95%   BMI 31.45 kg/m²        Generally: Pleasant male in no acute distress  Cardiac Exam: regular, rate, and rhythm. Normal S1 and S2. No murmurs, gallops, or rubs  Pulmonary exam: Clear to auscultation bilaterally  Abdominal exam: Positive bowel sounds in all four quadrants, soft, nondistended, nontender  Extremities: 2+ dorsalis pedis pulses bilaterally. No pedal edema    bilaterally    LABS/Radiological Tests:  Lab Results   Component Value Date/Time    WBC 5.9 06/25/2019 09:48 AM    HGB 14.6 06/25/2019 09:48 AM    HCT 44.6 06/25/2019 09:48 AM    PLATELET 904 25/28/9949 09:48 AM     Lab Results   Component Value Date/Time    Sodium 142 06/25/2019 09:48 AM    Potassium 4.5 06/25/2019 09:48 AM    Chloride 109 (H) 06/25/2019 09:48 AM    CO2 26 06/25/2019 09:48 AM    Glucose 97 06/25/2019 09:48 AM    BUN 17 06/25/2019 09:48 AM    Creatinine 1.16 06/25/2019 09:48 AM     Lab Results   Component Value Date/Time    Cholesterol, total 182 06/25/2019 09:48 AM    HDL Cholesterol 40 06/25/2019 09:48 AM    LDL, calculated 101.4 (H) 06/25/2019 09:48 AM    Triglyceride 203 (H) 06/25/2019 09:48 AM     No results found for: GPT    Previous labs      ASSESSMENT/PLAN:    1. Diabetes mellitus, stable ([facility]): will d/c metformin and switch to victoza and decrease glipizide 5mg one po bid. Work on diet and exercise. He will monitor sugars and update us on Monday. Given coupon card information.   -     liraglutide (VICTOZA) 0.6 mg/0.1 mL (18 mg/3 mL) pnij; Take 0.6mg subcutaneously once a week  -     METABOLIC PANEL, COMPREHENSIVE; Future  -     LIPID PANEL; Future  -     HEMOGLOBIN A1C WITH EAG; Future  -     MICROALBUMIN, UR, RAND W/ MICROALB/CREAT RATIO; Future  -     URINALYSIS W/ RFLX MICROSCOPIC; Future  -     TSH 3RD GENERATION; Future    2. Benign hypertension without CHF;stable. Continue toprol and lisinopril. Diet and exercise. -     CBC W/O DIFF; Future      3.    Requested Prescriptions     Signed Prescriptions Disp Refills    liraglutide (VICTOZA) 0.6 mg/0.1 mL (18 mg/3 mL) pnij 15 mL 2     Sig: Take 0.6mg subcutaneously once a week       4. Patient verbalized understanding and agreement with the plan. 5. Patient was given an after-visit summary. Follow-up and Dispositions    · Return in about 2 weeks (around 9/18/2019) for f/u DM  or sooner if worsening symptoms.                      Jasmin Zamarripa MD

## 2019-09-04 NOTE — PATIENT INSTRUCTIONS
1) Stop metformin. Decrease glipizide 5mg one twice a day. 2) FOllow-up in 2 weeks or sooner if worsening symptoms. 3) Work on diet and exercise. 4) Update us with sugars on Monday.

## 2019-09-04 NOTE — PROGRESS NOTES
ROOM # 1  Identified pt with two pt identifiers(name and ). Reviewed record in preparation for visit and have obtained necessary documentation. Chief Complaint   Patient presents with    Medication Evaluation    Gas      [employer] preferred language for health care discussion is english/other. Is the patient using any DME equipment during OV? NO    [employer] is due for:  Health Maintenance Due   Topic    FOOT EXAM Q1     EYE EXAM RETINAL OR DILATED     Shingrix Vaccine Age 50> (1 of 2)    MICROALBUMIN Q1     HEMOGLOBIN A1C Q6M     Influenza Age 5 to Adult      Health Maintenance reviewed and discussed per provider  Please order/place referral if appropriate. Advance Directive:  1. Do you have an advance directive in place? Patient Reply: NO    2. If not, would you like material regarding how to put one in place? NO    Coordination of Care:  1. Have you been to the ER, urgent care clinic since your last visit? Hospitalized since your last visit? NO    2. Have you seen or consulted any other health care providers outside of the [address] [place] [address] since your last visit? Include any pap smears or colon screening. NO    Patient is accompanied by self I have received verbal consent from [employer] to discuss any/all medical information while they are present in the room.     Learning Assessment:  Learning Assessment 2016   PRIMARY LEARNER Patient Patient   HIGHEST LEVEL OF EDUCATION - PRIMARY LEARNER  - 2 YEARS OF COLLEGE   PRIMARY LANGUAGE ENGLISH ENGLISH   LEARNER PREFERENCE PRIMARY LISTENING DEMONSTRATION   ANSWERED BY self patient   RELATIONSHIP SELF SELF     Depression Screening:  3 most recent PHQ Screens 2016   Little interest or pleasure in doing things Several days Nearly every day Several days   Feeling down, depressed, irritable, or hopeless Nearly every day Nearly every day Several days   Total Score PHQ 2 4 6 2     Abuse Screening:  Abuse Screening Questionnaire 10/25/2018   Do you ever feel afraid of your partner? N   Are you in a relationship with someone who physically or mentally threatens you? N   Is it safe for you to go home? Y     Fall Risk  Fall Risk Assessment, last 12 mths 8/4/2014   Able to walk? Yes   Fall in past 12 months?  No

## 2019-09-27 ENCOUNTER — TELEPHONE (OUTPATIENT)
Dept: INTERNAL MEDICINE CLINIC | Age: 49
End: 2019-09-27

## 2019-09-27 RX ORDER — LANCETS
EACH MISCELLANEOUS
Qty: 100 EACH | Refills: 11 | Status: SHIPPED | OUTPATIENT
Start: 2019-09-27 | End: 2021-03-08

## 2019-09-27 RX ORDER — INSULIN PUMP SYRINGE, 3 ML
EACH MISCELLANEOUS
Qty: 1 KIT | Refills: 0 | Status: SHIPPED | OUTPATIENT
Start: 2019-09-27 | End: 2021-03-08

## 2019-09-27 NOTE — TELEPHONE ENCOUNTER
Patient is calling in stating he needs an order for a glucometer and supplies faxed to his [address]

## 2019-09-27 NOTE — TELEPHONE ENCOUNTER
Sent electronically:    Requested Prescriptions     Signed Prescriptions Disp Refills    lancets misc 100 Each 11     Sig: Check fasting sugars daily before breakfast     Authorizing Provider: Louisa Nettles Blood-Glucose Meter (FREESTYLE LITE METER) monitoring kit 1 Kit 0     Sig: Check fasting sugars daily before breakfast. E11.9     Authorizing Provider: Louisa Nettles glucose blood VI test strips (FREESTYLE LITE STRIPS) strip 100 Strip 11     Sig: Check fasting sugars daily before breakfast     Authorizing Provider: Hector Fowler

## 2019-10-03 ENCOUNTER — TELEPHONE (OUTPATIENT)
Dept: INTERNAL MEDICINE CLINIC | Age: 49
End: 2019-10-03

## 2019-10-03 NOTE — TELEPHONE ENCOUNTER
Prior auth request received for patient FreeStyle Lite Test Strips and Lancets. [facility] department contacted. Was informed Freestyle lite test strips is on patient formulary.   Prior auth completed and submitted to pharmacy review team.  Reference number [id number]

## 2019-10-10 NOTE — TELEPHONE ENCOUNTER
Prior Christiano Monreal has been denied under (prescription drug plan) Medicare part D but approved coverage under (Medical Benefit) Medicare Part B. [facility] has approved coverage for patient supplies under Part B Benefit for 2 years.

## 2019-11-01 DIAGNOSIS — I10 BENIGN HYPERTENSION WITHOUT CHF: ICD-10-CM

## 2019-11-01 DIAGNOSIS — Z76.0 MEDICATION REFILL: ICD-10-CM

## 2019-11-01 RX ORDER — LISINOPRIL 20 MG/1
TABLET ORAL
Qty: 90 TAB | Refills: 1 | Status: SHIPPED | OUTPATIENT
Start: 2019-11-01 | End: 2021-01-04 | Stop reason: SDUPTHER

## 2020-08-06 ENCOUNTER — TELEPHONE (OUTPATIENT)
Dept: FAMILY MEDICINE CLINIC | Facility: CLINIC | Age: 50
End: 2020-08-06

## 2020-08-06 NOTE — TELEPHONE ENCOUNTER
Patient is experiencing left knee pain and would like to have a referral to Orthopedics  Dr Aguilar Gave  [phone]

## 2020-08-10 NOTE — TELEPHONE ENCOUNTER
Spoke with patient and  2 patient identifiers confirmed. Advised patient of information below. Patient understood and had no further questions. Patient will call [phone] to schedule appt with any provider.

## 2020-08-10 NOTE — TELEPHONE ENCOUNTER
Minus NATI Hardy  You 8 hours ago (8:30 AM)       Please make sure patient called and asked for appointment at ortho, if not have him do so.  Otherwise he will need and appointment has not been seen in a year    Message text

## 2020-08-26 ENCOUNTER — TELEPHONE (OUTPATIENT)
Dept: FAMILY MEDICINE CLINIC | Facility: CLINIC | Age: 50
End: 2020-08-26

## 2020-08-26 NOTE — TELEPHONE ENCOUNTER
Patient wanted to make an apt to see Dr. Jazzmine Sheppard. Patient ears are hurting and lymph nodes are bothering him. LPN told informed he would need to be seen face to face since we never seen him before. Patient didn't want to wait to be seen LPN advised urgent care or ER.

## 2020-10-06 ENCOUNTER — VIRTUAL VISIT (OUTPATIENT)
Dept: FAMILY MEDICINE CLINIC | Age: 50
End: 2020-10-06
Payer: MEDICARE

## 2020-10-06 DIAGNOSIS — C80.1: Primary | ICD-10-CM

## 2020-10-06 DIAGNOSIS — E11.9 TYPE 2 DIABETES MELLITUS WITHOUT COMPLICATION, WITHOUT LONG-TERM CURRENT USE OF INSULIN (HCC): ICD-10-CM

## 2020-10-06 DIAGNOSIS — E78.5 HYPERLIPIDEMIA, UNSPECIFIED HYPERLIPIDEMIA TYPE: ICD-10-CM

## 2020-10-06 DIAGNOSIS — I11.9 HYPERTENSIVE HEART DISEASE WITHOUT HEART FAILURE: ICD-10-CM

## 2020-10-06 PROCEDURE — 3017F COLORECTAL CA SCREEN DOC REV: CPT | Performed by: INTERNAL MEDICINE

## 2020-10-06 PROCEDURE — 99215 OFFICE O/P EST HI 40 MIN: CPT | Performed by: INTERNAL MEDICINE

## 2020-10-06 PROCEDURE — G8421 BMI NOT CALCULATED: HCPCS | Performed by: INTERNAL MEDICINE

## 2020-10-06 PROCEDURE — 2022F DILAT RTA XM EVC RTNOPTHY: CPT | Performed by: INTERNAL MEDICINE

## 2020-10-06 PROCEDURE — G8756 NO BP MEASURE DOC: HCPCS | Performed by: INTERNAL MEDICINE

## 2020-10-06 PROCEDURE — G9717 DOC PT DX DEP/BP F/U NT REQ: HCPCS | Performed by: INTERNAL MEDICINE

## 2020-10-06 PROCEDURE — G8427 DOCREV CUR MEDS BY ELIG CLIN: HCPCS | Performed by: INTERNAL MEDICINE

## 2020-10-06 PROCEDURE — 3046F HEMOGLOBIN A1C LEVEL >9.0%: CPT | Performed by: INTERNAL MEDICINE

## 2020-10-06 NOTE — PROGRESS NOTES
1. Have you been to the ER, urgent care clinic since your last visit? Hospitalized since your last visit? No    2. Have you seen or consulted any other health care providers outside of the [address] [place] [address] since your last visit? Include any pap smears or colon screening.  No        Chief Complaint   Patient presents with    New Patient     Previous PCP was Dr. Nitza Arriola Hypertension    Diabetes     see's Endocrinologist    Cholesterol Problem     see's Cardiologist

## 2020-10-06 NOTE — PROGRESS NOTES
HISTORY OF PRESENT ILLNESS  Josesito Danielle is a 45 y.o. male who presents today to [facility] care. Patient has history of coronary artery disease hypertension hyperlipidemia and diabetes. . He states his blood sugars are well controlled on Victoza. Overall he is feeling well and has no complaints today. Consent:  he and/or  healthcare decision maker is aware that this patient-initiated Telehealth encounter is a billable service, with coverage as determined by her insurance carrier. he is aware that she may receive a bill and has provided verbal consent to proceed: Yes    I was at home while conducting this encounter. .  New Patient   The history is provided by the medical records and patient. Pertinent negatives include no chest pain, no abdominal pain, no headaches and no shortness of breath. Hypertension    The history is provided by the patient and medical records. This is a chronic problem. The problem has been gradually improving. Pertinent negatives include no chest pain, no orthopnea, no malaise/fatigue, no headaches, no peripheral edema, no dizziness and no shortness of breath. There are no associated agents to hypertension. Risk factors include dyslipidemia, family history, diabetes mellitus and male gender. Diabetes   The history is provided by the patient. This is a chronic problem. The problem has been gradually improving. Pertinent negatives include no chest pain, no abdominal pain, no headaches and no shortness of breath. The symptoms are aggravated by eating. The symptoms are relieved by medications. Treatments tried: victoza. The treatment provided significant relief. Cholesterol Problem   The history is provided by the patient and medical records. This is a chronic problem. The problem has been gradually improving. Pertinent negatives include no chest pain, no abdominal pain, no headaches and no shortness of breath. The symptoms are relieved by medications. Treatments tried: Lipitor. The treatment provided significant relief. Heart Problem    The history is provided by the medical records (Patient has history of coronary artery disease and left ventricular hypertrophy). This is a chronic problem. The problem has not changed since onset. The problem is associated with nothing. Pertinent negatives include no diaphoresis, no malaise/fatigue, no numbness, no chest pain, no chest pressure, no orthopnea, no abdominal pain, no headaches, no dizziness and no shortness of breath. His past medical history is significant for hypertension. Colon Cancer Screening   The history is provided by the patient (Patient tells me that he had cancerous cells found in polyps). This is a recurrent problem. Pertinent negatives include no chest pain, no abdominal pain, no headaches and no shortness of breath. Allergies   Allergen Reactions    Iodine Anaphylaxis    Norco [Hydrocodone-Acetaminophen] Nausea and Vomiting     Gi distress    Shellfish Derived Swelling and Angioedema     Other reaction(s): anaphylaxis/angioedema  SHRIMP AND CRAB    Metformin Diarrhea     Current Outpatient Medications on File Prior to Visit   Medication Sig Dispense Refill    atorvastatin (LIPITOR) 20 mg tablet Take 1 tablet by mouth once daily 60 Tab 3    lancets misc Check fasting sugars daily before breakfast 100 Each 11    Blood-Glucose Meter (FREESTYLE LITE METER) monitoring kit Check fasting sugars daily before breakfast. E11.9 1 Kit 0    glucose blood VI test strips (FREESTYLE LITE STRIPS) strip Check fasting sugars daily before breakfast 100 Strip 11    liraglutide (VICTOZA) 0.6 mg/0.1 mL (18 mg/3 mL) pnij Take 0.6mg subcutaneously once a week (Patient taking differently: 1.8 mg. Take 1.8mg subcutaneously once a week) 15 mL 2    nitroglycerin (NITROSTAT) 0.4 mg SL tablet 1 Tab by SubLINGual route every five (5) minutes as needed for Chest Pain. 25 Tab 1    aspirin delayed-release 81 mg tablet Take 1 Tab by mouth daily. 100 Tab 1    albuterol (PROVENTIL HFA, VENTOLIN HFA, PROAIR HFA) 90 mcg/actuation inhaler Take 1-2 puffs every 4-6 hrs prn shortness of breath 3 Inhaler 3    metoprolol succinate (TOPROL-XL) 25 mg XL tablet Take 1 Tab by mouth daily. 90 Tab 1    Blood-Glucose Meter monitoring kit Check fasting sugars daily before breakfast 1 Kit 0    PARoxetine (PAXIL) 20 mg tablet Take 20 mg by mouth daily.  QUEtiapine (SEROQUEL) 100 mg tablet Take 100 mg by mouth nightly.  fluticasone-salmeterol (ADVAIR DISKUS) 500-50 mcg/dose diskus inhaler Take 1 Puff by inhalation every twelve (12) hours.  lisinopril (PRINIVIL, ZESTRIL) 20 mg tablet TAKE 1 TABLET BY MOUTH ONCE DAILY 90 Tab 1    [DISCONTINUED] metoprolol succinate (TOPROL-XL) 25 mg XL tablet TAKE 1 TABLET BY MOUTH ONCE DAILY 90 Tab 1    omeprazole (PRILOSEC) 40 mg capsule Take one po daily as needed for GERD 90 Cap 3    [DISCONTINUED] lisinopril (PRINIVIL, ZESTRIL) 20 mg tablet Take 1 Tab by mouth daily. 90 Tab 3    [DISCONTINUED] metFORMIN ER (GLUCOPHAGE XR) 500 mg tablet Take 2 Tabs by mouth two (2) times a day. 360 Tab 3    [DISCONTINUED] glipiZIDE (GLUCOTROL) 10 mg tablet Take 1 Tab by mouth two (2) times a day. 180 Tab 3    [DISCONTINUED] meloxicam (MOBIC) 7.5 mg tablet Take 1-2 po daily prn for pain 60 Tab 3    [DISCONTINUED] lidocaine (XYLOCAINE) 5 % ointment APPLY THIN FILM RUB EXTERNALLY TWICE DAILY IN THE MORNING AND EVENING  3    [DISCONTINUED] tiotropium (SPIRIVA) 18 mcg inhalation capsule Take 1 Cap by inhalation daily.  [DISCONTINUED] montelukast (SINGULAIR) 10 mg tablet Take 10 mg by mouth daily. No current facility-administered medications on file prior to visit. Past Medical History:   Diagnosis Date    Abnormal EKG 7/9/2018    Chronic ST-T changes noted likely from LVH. Prior cardiac workup includes stress test and cardiac catheterization.   Reviewed    Advance directive discussed with patient 05/03/2017   Maki Guadarrama Arthritis     left knee    Asthma     CAD (coronary artery disease)     heart attack 6/2012, Being followed by Dr. Zoya Hamilton Cocaine use 2017    Depression     Diabetes ([facility]     DVT (deep venous thrombosis) (HCC)     left leg in the past (Pt was on coumadin)    GERD (gastroesophageal reflux disease)     Hyperlipidemia 7/9/2018    cramps with pravastatin. Consider alternate therapy after surgery    Hypertension     Hypertensive heart disease without heart failure 7/9/2018    Blood pressure control. LVH on EKG.  Hypertensive left ventricular hypertrophy, without heart failure 7/9/2018    Mild LVH on last echo.  Medication refill 7/9/2018    Pre-operative cardiovascular examination 7/9/2018    Knee surgery. Stable cardiac status.   Okay to proceed with moderate cardiac risk    Psychiatric disorder     depression and schziophenia    Schizophrenia ([facility]     Will be seeing [facility]    Type 2 diabetes mellitus without complication, without long-term current use of insulin ([facility] 7/9/2018    On treatment    Unspecified sleep apnea     cpap machine,Will be seeing neurologist     Past Surgical History:   Procedure Laterality Date    ABDOMEN SURGERY [address] UNLISTED      HX COLONOSCOPY      HX HEART CATHETERIZATION  2014    cardiac cath - normal coronaries    HX HERNIA REPAIR      HX KNEE REPLACEMENT      left TKR    HX ORTHOPAEDIC      L knee 1987, 1996    HX OTHER SURGICAL      eye sx at 2 yrs old     Family History   Problem Relation Age of Onset    Anemia Mother     Cancer Father         prostate    Cancer Brother     Diabetes Brother      Social History     Socioeconomic History    Marital status: SINGLE     Spouse name: Not on file    Number of children: Not on file    Years of education: Not on file    Highest education level: Not on file   Occupational History    Not on file   Social Needs    Financial resource strain: Not on file    Food insecurity Worry: Not on file     Inability: Not on file    Transportation needs     Medical: Not on file     Non-medical: Not on file   Tobacco Use    Smoking status: Current Every Day Smoker     Packs/day: 0.25     Types: Cigars    Smokeless tobacco: Never Used    Tobacco comment: black and mild per day   Substance and Sexual Activity    Alcohol use: Yes     Frequency: 2-4 times a month     Drinks per session: 1 or 2     Binge frequency: Never     Comment: \"a little\"/ occ    Drug use: Yes     Types: Marijuana     Comment: pt denies but pos for cocaine in 2017 and pos for marijuana in 2018    Sexual activity: Yes     Partners: Female   Lifestyle    Physical activity     Days per week: Not on file     Minutes per session: Not on file    Stress: Not on file   Relationships    Social connections     Talks on phone: Not on file     Gets together: Not on file     Attends Yarsanism service: Not on file     Active member of club or organization: Not on file     Attends meetings of clubs or organizations: Not on file     Relationship status: Not on file    Intimate partner violence     Fear of current or ex partner: Not on file     Emotionally abused: Not on file     Physically abused: Not on file     Forced sexual activity: Not on file   Other Topics Concern    Not on file   Social History Narrative    Not on file         Review of Systems   Constitutional: Negative. Negative for diaphoresis and malaise/fatigue. Eyes: Negative. Respiratory: Negative. Negative for shortness of breath. Cardiovascular: Negative. Negative for chest pain and orthopnea. Gastrointestinal: Negative for abdominal pain. Musculoskeletal: Negative. Neurological: Negative. Negative for dizziness, numbness and headaches. Endo/Heme/Allergies: Negative. Psychiatric/Behavioral: Negative. There were no vitals taken for this visit. Physical Exam  Nursing note reviewed.    Constitutional:       Appearance: He is well-developed. HENT:      Head: Normocephalic and atraumatic. Pulmonary:      Effort: Pulmonary effort is normal. No respiratory distress. Musculoskeletal: Normal range of motion. General: No tenderness. Skin:     General: Skin is dry. Coloration: Skin is not pale. Findings: No erythema or rash. Neurological:      Mental Status: He is alert and oriented to person, place, and time. Cranial Nerves: No cranial nerve deficit. Coordination: Coordination normal.   Psychiatric:         Behavior: Behavior normal.         Thought Content: Thought content normal.         ASSESSMENT and PLAN no primary care    ICD-10-CM ICD-9-CM    1. 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 Hypertensive heart disease without heart failure  B08.9 625.10 METABOLIC PANEL, COMPREHENSIVE      URINALYSIS W/ RFLX MICROSCOPIC   3. Type 2 diabetes mellitus without complication, without long-term current use of insulin (HCC)  E11.9 250.00 HEMOGLOBIN A1C WITH EAG      CBC WITH AUTOMATED DIFF      METABOLIC PANEL, COMPREHENSIVE      MICROALBUMIN, UR, RAND W/ MICROALB/CREAT RATIO      URINALYSIS W/ RFLX MICROSCOPIC   4. Hyperlipidemia, unspecified hyperlipidemia type  O21.5 996.4 METABOLIC PANEL, COMPREHENSIVE      LIPID PANEL   We discussed the expected course, resolution and complications of the diagnosis(es) in detail. Medication risks, benefits, costs, interactions, and alternatives were discussed as indicated. I advised her to contact the office if her condition worsens, changes or fails to improve as anticipated. She expressed understanding with the diagnosis(es) and plan.      Pursuant to the emergency declaration under the [facility] waiver authority and the [employer] General Act, this Virtual  Visit was conducted, with patient's consent, to reduce the patient's risk of exposure to COVID-19 and provide continuity of care for an established patient. Services were provided through a video synchronous discussion virtually to substitute for in-person clinic visit. Gerry Johnson MD      Follow-up and Dispositions    · Return in about 3 months (around 1/6/2021).

## 2020-10-13 ENCOUNTER — TELEPHONE (OUTPATIENT)
Dept: PHARMACY | Age: 50
End: 2020-10-13

## 2020-10-13 NOTE — TELEPHONE ENCOUNTER
CLINICAL PHARMACY: STATIN REVIEW    SUBJECTIVE:   Identified as DM care gap for [employer]: statin therapy. Patient has been identified as having an inpatient hospitalization for MI, CABG, PCI, or diagnosis of ischemic vascular disease in the past and not currently filling a moderate or high intensity statin. OBJECTIVE:  Allergies   Allergen Reactions    Iodine Anaphylaxis    Norco [Hydrocodone-Acetaminophen] Nausea and Vomiting     Gi distress    Shellfish Derived Swelling and Angioedema     Other reaction(s): anaphylaxis/angioedema  SHRIMP AND CRAB    Metformin Diarrhea       Medications per current medication list:  Current Outpatient Medications   Medication Sig Dispense Refill    atorvastatin (LIPITOR) 20 mg tablet Take 1 tablet by mouth once daily 60 Tab 3    lisinopril (PRINIVIL, ZESTRIL) 20 mg tablet TAKE 1 TABLET BY MOUTH ONCE DAILY 90 Tab 1    lancets misc Check fasting sugars daily before breakfast 100 Each 11    Blood-Glucose Meter (FREESTYLE LITE METER) monitoring kit Check fasting sugars daily before breakfast. E11.9 1 Kit 0    glucose blood VI test strips (FREESTYLE LITE STRIPS) strip Check fasting sugars daily before breakfast 100 Strip 11    liraglutide (VICTOZA) 0.6 mg/0.1 mL (18 mg/3 mL) pnij Take 0.6mg subcutaneously once a week (Patient taking differently: 1.8 mg. Take 1.8mg subcutaneously once a week) 15 mL 2    nitroglycerin (NITROSTAT) 0.4 mg SL tablet 1 Tab by SubLINGual route every five (5) minutes as needed for Chest Pain. 25 Tab 1    aspirin delayed-release 81 mg tablet Take 1 Tab by mouth daily. 100 Tab 1    albuterol (PROVENTIL HFA, VENTOLIN HFA, PROAIR HFA) 90 mcg/actuation inhaler Take 1-2 puffs every 4-6 hrs prn shortness of breath 3 Inhaler 3    omeprazole (PRILOSEC) 40 mg capsule Take one po daily as needed for GERD 90 Cap 3    metoprolol succinate (TOPROL-XL) 25 mg XL tablet Take 1 Tab by mouth daily.  90 Tab 1    Blood-Glucose Meter monitoring kit Check fasting sugars daily before breakfast 1 Kit 0    PARoxetine (PAXIL) 20 mg tablet Take 20 mg by mouth daily.  QUEtiapine (SEROQUEL) 100 mg tablet Take 100 mg by mouth nightly.  fluticasone-salmeterol (ADVAIR DISKUS) 500-50 mcg/dose diskus inhaler Take 1 Puff by inhalation every twelve (12) hours. Labs:  Lab Results   Component Value Date/Time    Cholesterol, total 182 06/25/2019 09:48 AM    HDL Cholesterol 40 06/25/2019 09:48 AM    LDL, calculated 101.4 (H) 06/25/2019 09:48 AM    VLDL, calculated 40.6 06/25/2019 09:48 AM    Triglyceride 203 (H) 06/25/2019 09:48 AM    CHOL/HDL Ratio 4.6 06/25/2019 09:48 AM     ALT (SGPT)   Date Value Ref Range Status   06/25/2019 38 16 - 61 U/L Final     AST (SGOT)   Date Value Ref Range Status   06/25/2019 21 15 - 37 U/L Final       ASCVD risk:  28.2%    BP Readings from Last 1 Encounters:   09/04/19 128/86       Social History     Tobacco Use    Smoking status: Current Every Day Smoker     Packs/day: 0.25     Types: Cigars    Smokeless tobacco: Never Used    Tobacco comment: black and mild per day   Substance Use Topics    Alcohol use: Yes     Frequency: 2-4 times a month     Drinks per session: 1 or 2     Binge frequency: Never     Comment: \"a little\"/ occ         ASSESSMENT:  Hyperlipidemia Goal: Patient is not prescribed high-intensity statin therapy. Is currently prescribed moderate-intensity statin    ADA Guidelines:    >/= 36years old:   o No history of ASCVD or 10-year ASCVD risk < 20% - moderate-intensity statin is recommended.   o History of ASCVD or 10-year ASCVD risk > 20% - high-intensity statin is recommended. Patient's most recent ALT is 38 U/L. Per chart review, patient is prescribed atorvastatin 20 mg tablet    PLAN:  Will route to clinical pharmacy  to outreach to pharmacy to confirm most recent refill data as well as prescription, prescriber and insurance information.       Thank you,  Amarilis Hatch, PharmD, [address], [address]  Pharmacist  O: [phone]  Department, toll free: [phone], option 7

## 2020-10-15 ENCOUNTER — TELEPHONE (OUTPATIENT)
Dept: FAMILY MEDICINE CLINIC | Age: 50
End: 2020-10-15

## 2020-10-16 NOTE — TELEPHONE ENCOUNTER
CLINICAL PHARMACY: STATIN THERAPY REVIEW    Identified care gap per [employer] statin use in persons with diabetes and adherence     Per [facility]    Medication: Atorvastatin 20mg  Recent fill dates: 2020, 10/6/2020  Day supply: 60,60  Refills remainin  Directions: 1 tab po qd  Insurance billed: Le  Prescribing Provider: Danilo Moseley NP      No patient outreach planned at this time. Patient has supply on hand and refills available.      Jamison Sigala, [address] [address], [facility]  Department, toll free: [phone], option 7

## 2020-10-19 NOTE — TELEPHONE ENCOUNTER
CLINICAL PHARMACY CONSULT: MED RECONCILIATION/REVIEW ADDENDUM  For Pharmacy Admin Tracking Only  PHSO: PHSO Patient?: Yes  Total # of Interventions Recommended: Count: 0  - Maintenance Safety Lab Monitoring #: 1  Recommended intervention potential cost savings: 1  Total Interventions Accepted: 0  Time Spent (min): Jasper Singh 930, [facility], PharmD  [address]

## 2020-10-20 ENCOUNTER — APPOINTMENT (OUTPATIENT)
Dept: FAMILY MEDICINE CLINIC | Age: 50
End: 2020-10-20

## 2020-10-20 ENCOUNTER — HOSPITAL ENCOUNTER (OUTPATIENT)
Dept: LAB | Age: 50
Discharge: HOME | End: 2020-10-20
Payer: MEDICARE

## 2020-10-20 DIAGNOSIS — I11.9 HYPERTENSIVE HEART DISEASE WITHOUT HEART FAILURE: ICD-10-CM

## 2020-10-20 DIAGNOSIS — E11.9 TYPE 2 DIABETES MELLITUS WITHOUT COMPLICATION, WITHOUT LONG-TERM CURRENT USE OF INSULIN (HCC): ICD-10-CM

## 2020-10-20 DIAGNOSIS — E78.5 HYPERLIPIDEMIA, UNSPECIFIED HYPERLIPIDEMIA TYPE: ICD-10-CM

## 2020-10-20 LAB
ALBUMIN SERPL-MCNC: 4 G/DL (ref 3.4–5)
ALBUMIN/GLOB SERPL: 1.4 {RATIO} (ref 0.8–1.7)
ALP SERPL-CCNC: 80 U/L (ref 45–117)
ALT SERPL-CCNC: 60 U/L (ref 16–61)
ANION GAP SERPL CALC-SCNC: 5 MMOL/L (ref 3–18)
APPEARANCE UR: CLEAR
AST SERPL-CCNC: 40 U/L (ref 10–38)
BASOPHILS # BLD: 0 K/UL (ref 0–0.1)
BASOPHILS NFR BLD: 0 % (ref 0–2)
BILIRUB SERPL-MCNC: 0.2 MG/DL (ref 0.2–1)
BILIRUB UR QL: NEGATIVE
BUN SERPL-MCNC: 12 MG/DL (ref 7–18)
BUN/CREAT SERPL: 13 (ref 12–20)
CALCIUM SERPL-MCNC: 8.9 MG/DL (ref 8.5–10.1)
CHLORIDE SERPL-SCNC: 113 MMOL/L (ref 100–111)
CHOLEST SERPL-MCNC: 119 MG/DL
CO2 SERPL-SCNC: 27 MMOL/L (ref 21–32)
COLOR UR: YELLOW
CREAT SERPL-MCNC: 0.94 MG/DL (ref 0.6–1.3)
CREAT UR-MCNC: 200 MG/DL (ref 30–125)
DIFFERENTIAL METHOD BLD: NORMAL
EOSINOPHIL # BLD: 0.2 K/UL (ref 0–0.4)
EOSINOPHIL NFR BLD: 4 % (ref 0–5)
ERYTHROCYTE [DISTWIDTH] IN BLOOD BY AUTOMATED COUNT: 13.7 % (ref 11.6–14.5)
EST. AVERAGE GLUCOSE BLD GHB EST-MCNC: 134 MG/DL
GLOBULIN SER CALC-MCNC: 2.9 G/DL (ref 2–4)
GLUCOSE SERPL-MCNC: 120 MG/DL (ref 74–99)
GLUCOSE UR STRIP.AUTO-MCNC: NEGATIVE MG/DL
HBA1C MFR BLD: 6.3 % (ref 4.2–5.6)
HCT VFR BLD AUTO: 43 % (ref 36–48)
HDLC SERPL-MCNC: 35 MG/DL (ref 40–60)
HDLC SERPL: 3.4 {RATIO} (ref 0–5)
HGB BLD-MCNC: 13.7 G/DL (ref 13–16)
HGB UR QL STRIP: NEGATIVE
KETONES UR QL STRIP.AUTO: NEGATIVE MG/DL
LDLC SERPL CALC-MCNC: 50.4 MG/DL (ref 0–100)
LEUKOCYTE ESTERASE UR QL STRIP.AUTO: NEGATIVE
LIPID PROFILE,FLP: ABNORMAL
LYMPHOCYTES # BLD: 2.2 K/UL (ref 0.9–3.6)
LYMPHOCYTES NFR BLD: 40 % (ref 21–52)
MCH RBC QN AUTO: 27.5 PG (ref 24–34)
MCHC RBC AUTO-ENTMCNC: 31.9 G/DL (ref 31–37)
MCV RBC AUTO: 86.3 FL (ref 74–97)
MICROALBUMIN UR-MCNC: 0.62 MG/DL (ref 0–3)
MICROALBUMIN/CREAT UR-RTO: 3 MG/G (ref 0–30)
MONOCYTES # BLD: 0.3 K/UL (ref 0.05–1.2)
MONOCYTES NFR BLD: 6 % (ref 3–10)
NEUTS SEG # BLD: 2.7 K/UL (ref 1.8–8)
NEUTS SEG NFR BLD: 50 % (ref 40–73)
NITRITE UR QL STRIP.AUTO: NEGATIVE
PH UR STRIP: 5 [PH] (ref 5–8)
PLATELET # BLD AUTO: 292 K/UL (ref 135–420)
PMV BLD AUTO: 9.9 FL (ref 9.2–11.8)
POTASSIUM SERPL-SCNC: 4.2 MMOL/L (ref 3.5–5.5)
PROT SERPL-MCNC: 6.9 G/DL (ref 6.4–8.2)
PROT UR STRIP-MCNC: NEGATIVE MG/DL
RBC # BLD AUTO: 4.98 M/UL (ref 4.7–5.5)
SODIUM SERPL-SCNC: 145 MMOL/L (ref 136–145)
SP GR UR REFRACTOMETRY: 1.02 (ref 1–1.03)
TRIGL SERPL-MCNC: 168 MG/DL (ref ?–150)
UROBILINOGEN UR QL STRIP.AUTO: 0.2 EU/DL (ref 0.2–1)
VLDLC SERPL CALC-MCNC: 33.6 MG/DL
WBC # BLD AUTO: 5.4 K/UL (ref 4.6–13.2)

## 2020-10-20 PROCEDURE — 82043 UR ALBUMIN QUANTITATIVE: CPT

## 2020-10-20 PROCEDURE — 80053 COMPREHEN METABOLIC PANEL: CPT

## 2020-10-20 PROCEDURE — 81003 URINALYSIS AUTO W/O SCOPE: CPT

## 2020-10-20 PROCEDURE — 36415 COLL VENOUS BLD VENIPUNCTURE: CPT

## 2020-10-20 PROCEDURE — 83036 HEMOGLOBIN GLYCOSYLATED A1C: CPT

## 2020-10-20 PROCEDURE — 80061 LIPID PANEL: CPT

## 2020-10-20 PROCEDURE — 85025 COMPLETE CBC W/AUTO DIFF WBC: CPT

## 2021-01-04 DIAGNOSIS — I10 BENIGN HYPERTENSION WITHOUT CHF: ICD-10-CM

## 2021-01-04 DIAGNOSIS — E11.9 DIABETES MELLITUS, STABLE (HCC): ICD-10-CM

## 2021-01-04 DIAGNOSIS — Z76.0 MEDICATION REFILL: ICD-10-CM

## 2021-01-06 RX ORDER — METOPROLOL SUCCINATE 25 MG/1
25 TABLET, EXTENDED RELEASE ORAL DAILY
Qty: 90 TAB | Refills: 1 | Status: SHIPPED | OUTPATIENT
Start: 2021-01-06 | End: 2021-07-23

## 2021-01-06 RX ORDER — LISINOPRIL 20 MG/1
TABLET ORAL
Qty: 90 TAB | Refills: 1 | Status: SHIPPED | OUTPATIENT
Start: 2021-01-06 | End: 2021-07-01 | Stop reason: SDUPTHER

## 2021-01-08 DIAGNOSIS — E11.9 TYPE 2 DIABETES MELLITUS WITHOUT COMPLICATION, WITHOUT LONG-TERM CURRENT USE OF INSULIN (HCC): Primary | ICD-10-CM

## 2021-01-08 RX ORDER — LANCETS
EACH MISCELLANEOUS
Qty: 100 EACH | Refills: 2 | Status: SHIPPED | OUTPATIENT
Start: 2021-01-08 | End: 2021-03-08

## 2021-02-08 ENCOUNTER — VIRTUAL VISIT (OUTPATIENT)
Dept: FAMILY MEDICINE CLINIC | Age: 51
End: 2021-02-08
Payer: MEDICARE

## 2021-02-08 DIAGNOSIS — I10 ESSENTIAL HYPERTENSION: ICD-10-CM

## 2021-02-08 DIAGNOSIS — E78.5 HYPERLIPIDEMIA, UNSPECIFIED HYPERLIPIDEMIA TYPE: ICD-10-CM

## 2021-02-08 DIAGNOSIS — U07.1 COVID-19: ICD-10-CM

## 2021-02-08 DIAGNOSIS — E11.9 TYPE 2 DIABETES MELLITUS WITHOUT COMPLICATION, WITHOUT LONG-TERM CURRENT USE OF INSULIN (HCC): Primary | ICD-10-CM

## 2021-02-08 PROBLEM — Z76.0 MEDICATION REFILL: Status: RESOLVED | Noted: 2018-07-09 | Resolved: 2021-02-08

## 2021-02-08 PROCEDURE — 3017F COLORECTAL CA SCREEN DOC REV: CPT | Performed by: INTERNAL MEDICINE

## 2021-02-08 PROCEDURE — 99214 OFFICE O/P EST MOD 30 MIN: CPT | Performed by: INTERNAL MEDICINE

## 2021-02-08 PROCEDURE — 3046F HEMOGLOBIN A1C LEVEL >9.0%: CPT | Performed by: INTERNAL MEDICINE

## 2021-02-08 PROCEDURE — G8421 BMI NOT CALCULATED: HCPCS | Performed by: INTERNAL MEDICINE

## 2021-02-08 PROCEDURE — 2022F DILAT RTA XM EVC RTNOPTHY: CPT | Performed by: INTERNAL MEDICINE

## 2021-02-08 PROCEDURE — G8756 NO BP MEASURE DOC: HCPCS | Performed by: INTERNAL MEDICINE

## 2021-02-08 PROCEDURE — G8427 DOCREV CUR MEDS BY ELIG CLIN: HCPCS | Performed by: INTERNAL MEDICINE

## 2021-02-08 PROCEDURE — G9717 DOC PT DX DEP/BP F/U NT REQ: HCPCS | Performed by: INTERNAL MEDICINE

## 2021-02-08 RX ORDER — ATORVASTATIN CALCIUM 20 MG/1
TABLET, FILM COATED ORAL
Qty: 60 TAB | Refills: 3 | Status: SHIPPED | OUTPATIENT
Start: 2021-02-08 | End: 2021-03-17 | Stop reason: SDUPTHER

## 2021-02-08 NOTE — PROGRESS NOTES
HISTORY OF PRESENT ILLNESS  Ashok Renteria is a 45 y.o. male presents for follow-up on diabetes hypertension and hyperlipidemia. Patient states that he tested positive for Covid on New Year's Day 2021. His only symptom was headache. He has not yet been retested. Consent:  he and/or  healthcare decision maker is aware that this patient-initiated Telehealth encounter is a billable service, with coverage as determined by her insurance carrier. he is aware that she may receive a bill and has provided verbal consent to proceed: Yes    I was at home while conducting this encounter. .  Cholesterol Problem  The history is provided by the patient and medical records. This is a chronic problem. The problem has been gradually improving. Pertinent negatives include no chest pain, no abdominal pain, no headaches and no shortness of breath. The symptoms are relieved by medications. Treatments tried: Lipitor. The treatment provided significant relief. Hypertension   The history is provided by the patient and medical records. This is a chronic problem. The problem has been gradually improving. Pertinent negatives include no chest pain, no orthopnea, no headaches, no peripheral edema, no dizziness and no shortness of breath. There are no associated agents to hypertension. Risk factors include dyslipidemia, family history, diabetes mellitus and male gender. Diabetes  The history is provided by the patient. This is a chronic problem. The problem has been gradually improving. Pertinent negatives include no chest pain, no abdominal pain, no headaches and no shortness of breath. The symptoms are aggravated by eating. The symptoms are relieved by medications. Treatments tried: victoza. The treatment provided significant relief. Positive For Covid-19  The history is provided by the patient and medical records. This is a new problem. The problem has not changed since onset. Pertinent negatives include no chest pain, no abdominal pain, no headaches and no shortness of breath. Nothing aggravates the symptoms. Nothing relieves the symptoms. He has tried nothing for the symptoms. Allergies   Allergen Reactions    Iodine Anaphylaxis    Norco [Hydrocodone-Acetaminophen] Nausea and Vomiting     Gi distress    Shellfish Derived Swelling and Angioedema     Other reaction(s): anaphylaxis/angioedema  SHRIMP AND CRAB    Metformin Diarrhea     Current Outpatient Medications on File Prior to Visit   Medication Sig Dispense Refill    lancets (Accu-Chek Fastclix Lancet Drum) misc Use to check blood sugar once a day. 100 Each 2    liraglutide (VICTOZA) 0.6 mg/0.1 mL (18 mg/3 mL) pnij 1.8 mg by SubCUTAneous route every seven (7) days. Take 1.8mg subcutaneously once a week 15 mL 2    lisinopriL (PRINIVIL, ZESTRIL) 20 mg tablet TAKE 1 TABLET BY MOUTH ONCE DAILY 90 Tab 1    metoprolol succinate (TOPROL-XL) 25 mg XL tablet Take 1 Tab by mouth daily. 90 Tab 1    lancets misc Check fasting sugars daily before breakfast 100 Each 11    Blood-Glucose Meter (FREESTYLE LITE METER) monitoring kit Check fasting sugars daily before breakfast. E11.9 1 Kit 0    glucose blood VI test strips (FREESTYLE LITE STRIPS) strip Check fasting sugars daily before breakfast 100 Strip 11    nitroglycerin (NITROSTAT) 0.4 mg SL tablet 1 Tab by SubLINGual route every five (5) minutes as needed for Chest Pain. 25 Tab 1    aspirin delayed-release 81 mg tablet Take 1 Tab by mouth daily. 100 Tab 1    albuterol (PROVENTIL HFA, VENTOLIN HFA, PROAIR HFA) 90 mcg/actuation inhaler Take 1-2 puffs every 4-6 hrs prn shortness of breath 3 Inhaler 3    omeprazole (PRILOSEC) 40 mg capsule Take one po daily as needed for GERD 90 Cap 3    Blood-Glucose Meter monitoring kit Check fasting sugars daily before breakfast 1 Kit 0    PARoxetine (PAXIL) 20 mg tablet Take 20 mg by mouth daily.  QUEtiapine (SEROQUEL) 100 mg tablet Take 100 mg by mouth nightly.       fluticasone-salmeterol (ADVAIR DISKUS) 500-50 mcg/dose diskus inhaler Take 1 Puff by inhalation every twelve (12) hours.  [DISCONTINUED] atorvastatin (LIPITOR) 20 mg tablet Take 1 tablet by mouth once daily 60 Tab 3     No current facility-administered medications on file prior to visit. Past Medical History:   Diagnosis Date    Abnormal EKG 7/9/2018    Chronic ST-T changes noted likely from LVH. Prior cardiac workup includes stress test and cardiac catheterization. Reviewed    Advance directive discussed with patient 05/03/2017    Arthritis     left knee    Asthma     CAD (coronary artery disease)     heart attack 6/2012, Being followed by Dr. Dom Guerra Cocaine use 2017    Depression     Diabetes ([address]     DVT (deep venous thrombosis) (HCC)     left leg in the past (Pt was on coumadin)    GERD (gastroesophageal reflux disease)     Hyperlipidemia 7/9/2018    cramps with pravastatin. Consider alternate therapy after surgery    Hypertension     Hypertensive heart disease without heart failure 7/9/2018    Blood pressure control. LVH on EKG.  Hypertensive left ventricular hypertrophy, without heart failure 7/9/2018    Mild LVH on last echo.  Medication refill 7/9/2018    Pre-operative cardiovascular examination 7/9/2018    Knee surgery. Stable cardiac status.   Okay to proceed with moderate cardiac risk    Psychiatric disorder     depression and schziophenia    Schizophrenia ([address]     Will be seeing REVENTIVE    Type 2 diabetes mellitus without complication, without long-term current use of insulin ([address] 7/9/2018    On treatment    Unspecified sleep apnea     cpap machine,Will be seeing neurologist     Past Surgical History:   Procedure Laterality Date    ABDOMEN SURGERY [facility] [address] HX COLONOSCOPY      HX HEART CATHETERIZATION  2014    cardiac cath - normal coronaries    HX HERNIA REPAIR      HX KNEE REPLACEMENT      left TKR    HX ORTHOPAEDIC L knee 1987, 1996    HX OTHER SURGICAL      eye sx at 2 yrs old     Family History   Problem Relation Age of Onset    Anemia Mother     Cancer Father         prostate    Cancer Brother     Diabetes Brother      Social History     Socioeconomic History    Marital status: SINGLE     Spouse name: Not on file    Number of children: Not on file    Years of education: Not on file    Highest education level: Not on file   Occupational History    Not on file   Social Needs    Financial resource strain: Not on file    Food insecurity     Worry: Not on file     Inability: Not on file    Transportation needs     Medical: Not on file     Non-medical: Not on file   Tobacco Use    Smoking status: Current Every Day Smoker     Packs/day: 0.25     Types: Cigars    Smokeless tobacco: Never Used    Tobacco comment: black and mild per day   Substance and Sexual Activity    Alcohol use: Yes     Frequency: 2-4 times a month     Drinks per session: 1 or 2     Binge frequency: Never     Comment: \"a little\"/ occ    Drug use: Yes     Types: Marijuana     Comment: pt denies but pos for cocaine in 2017 and pos for marijuana in 2018    Sexual activity: Yes     Partners: Female   Lifestyle    Physical activity     Days per week: Not on file     Minutes per session: Not on file    Stress: Not on file   Relationships    Social connections     Talks on phone: Not on file     Gets together: Not on file     Attends Church service: Not on file     Active member of club or organization: Not on file     Attends meetings of clubs or organizations: Not on file     Relationship status: Not on file    Intimate partner violence     Fear of current or ex partner: Not on file     Emotionally abused: Not on file     Physically abused: Not on file     Forced sexual activity: Not on file   Other Topics Concern    Not on file   Social History Narrative    Not on file       Review of Systems   Constitutional: Negative.     Eyes: Negative. Respiratory: Negative. Negative for shortness of breath. Cardiovascular: Negative. Negative for chest pain and orthopnea. Gastrointestinal: Negative for abdominal pain. Musculoskeletal: Negative. Neurological: Negative. Negative for dizziness and headaches. Endo/Heme/Allergies: Negative. Psychiatric/Behavioral: Negative. There were no vitals taken for this visit. Physical Exam  Nursing note reviewed. Constitutional:       Appearance: He is well-developed. HENT:      Head: Normocephalic and atraumatic. Pulmonary:      Effort: Pulmonary effort is normal. No respiratory distress. Musculoskeletal: Normal range of motion. General: No tenderness. Skin:     General: Skin is dry. Coloration: Skin is not pale. Findings: No erythema or rash. Neurological:      Mental Status: He is alert and oriented to person, place, and time. Cranial Nerves: No cranial nerve deficit. Coordination: Coordination normal.   Psychiatric:         Behavior: Behavior normal.         Thought Content: Thought content normal.         ASSESSMENT and PLAN    ICD-10-CM ICD-9-CM    1. Type 2 diabetes mellitus without complication, without long-term current use of insulin (HCC)  E11.9 250.00    2. Hyperlipidemia, unspecified hyperlipidemia type  E78.5 272.4 atorvastatin (LIPITOR) 20 mg tablet   3. Essential hypertension  I10 401.9    4. COVID-19  U07.1 079.89    We discussed the expected course, resolution and complications of the diagnosis(es) in detail. Medication risks, benefits, costs, interactions, and alternatives were discussed as indicated. I advised her to contact the office if her condition worsens, changes or fails to improve as anticipated. She expressed understanding with the diagnosis(es) and plan.      Pursuant to the emergency declaration under the [facility] waiver authority and the [employer] Supplemental Appropriations Act, this Virtual  Visit was conducted, with patient's consent, to reduce the patient's risk of exposure to COVID-19 and provide continuity of care for an established patient. Services were provided through a video synchronous discussion virtually to substitute for in-person clinic visit. Cindi Alfaro MD      Follow-up and Dispositions    · Return in about 4 months (around 6/8/2021).

## 2021-02-08 NOTE — PROGRESS NOTES
Chief Complaint   Patient presents with    Follow Up Chronic Condition     HTN, GERD, DM     1. Have you been to the ER, urgent care clinic since your last visit? Hospitalized since your last visit? Yes Where: Positive Covid Test Shira Zapata    2. Have you seen or consulted any other health care providers outside of the [address] [place] [address] since your last visit? Include any pap smears or colon screening.  No     Health Maintenance Due   Topic    Foot Exam Q1     Eye Exam Retinal or Dilated     COVID-19 Vaccine (1 of 2)    Shingrix Vaccine Age 50> (1 of 2)    Medicare Yearly Exam

## 2021-03-16 DIAGNOSIS — E78.5 HYPERLIPIDEMIA, UNSPECIFIED HYPERLIPIDEMIA TYPE: ICD-10-CM

## 2021-03-16 NOTE — TELEPHONE ENCOUNTER
CLINICAL PHARMACY: ADHERENCE REVIEW  Identified care gap per Le; fills at [facility]: ACE/ARB, Diabetes and Statin adherence    Last Visit: 2/8/21    Patient identified as LIS = 1, therefore patient may be able to receive a 90-day supply for the same cost as a 30-day supply    Patient not found in Outcomes MTM    [address]    Per [facility] DR BRANDY WILKINSON:   Lisinopril last picked up on 1/6/21 for 90 day supply. 1 refills remaining. Billed through [place]    BP Readings from Last 3 Encounters:   09/04/19 128/86   06/19/19 133/79   10/25/18 117/79     Estimated Creatinine Clearance: 105.8 mL/min (by C-G formula based on SCr of 0.94 mg/dL). DIABETES ADHERENCE    Per [facility]:   Jonh Mccarthy last picked up on 3/8/21 for 30 day supply. SIG: Inject 1.8 mg subcutaneously DAILY. 4 refills remaining. Billed through [facility] Rx Value Date/Time    Hemoglobin A1c 6.3 (H) 10/20/2020 08:59 AM    Hemoglobin A1c 7.2 (H) 12/14/2018 07:45 AM    Hemoglobin A1c 7.6 (H) 07/02/2018 08:08 AM     STATIN ADHERENCE    Per [facility]:   Atorvastatin 20 mg last picked up on 2/8/21 for 60 day supply. 3 refills remaining. Billed through [facility] Rx Value Date/Time    Cholesterol, total 119 10/20/2020 08:59 AM    HDL Cholesterol 35 (L) 10/20/2020 08:59 AM    LDL, calculated 50.4 10/20/2020 08:59 AM    VLDL, calculated 33.6 10/20/2020 08:59 AM    Triglyceride 168 (H) 10/20/2020 08:59 AM    CHOL/HDL Ratio 3.4 10/20/2020 08:59 AM     ALT (SGPT)   Date Value Ref Range Status   10/20/2020 60 16 - 61 U/L Final     AST (SGOT)   Date Value Ref Range Status   10/20/2020 40 (H) 10 - 38 U/L Final     The ASCVD Risk score (Brittany Arreguin, et al., 2013) failed to calculate for the following reasons:    ASCVD risk score not calculated     PLAN  The following are interventions that have been identified:  - Patient eligible for 90 day supply of atorvastatin, Victoza.  Patient identified as LIS = 1, therefore patient may be able to receive a 90-day supply for the same cost as a 30-day supply    Attempting to reach patient to review.  Left message asking for return call.       Future Appointments   Date Time Provider Chong Gloveri   3/29/2021  3:30 PM Aaliyah Ferrell MD [facility]   6/7/2021  2:00 PM Elias Patel MD AMA BS AMB       Viviane De León, PharmD, [address]  Direct: [phone]  Department, toll free: [phone], option 7

## 2021-03-17 RX ORDER — LIRAGLUTIDE 6 MG/ML
1.8 INJECTION SUBCUTANEOUS DAILY
COMMUNITY
End: 2021-03-17 | Stop reason: SDUPTHER

## 2021-03-17 NOTE — TELEPHONE ENCOUNTER
Elias Patel MD, patient would like 90 day supply prescriptions for his Victoza and atorvastatin. His insurance prefers this as well and may save him money. Rxs pended for your signature/modification for your convenience if you agree. LOV: 2/8/21  Next: 6/7/21    Thank you,  Viviane De León, PharmD, [facility], [address]  Direct: [phone]  Department, toll free: [phone], option 7  ====================================================    Reached patient to review. He states he takes the Victoza 1.8 mg DAILY- medication updated on med list.  He does prefer a 90 day supply of the Victoza and atorvastatin. Informed him would reach out to Dr. Yumiko Gruber with request.  Patient states his insurance is changing 4/1 and will have [employer].

## 2021-03-18 RX ORDER — ATORVASTATIN CALCIUM 20 MG/1
TABLET, FILM COATED ORAL
Qty: 90 TAB | Refills: 1 | Status: SHIPPED | OUTPATIENT
Start: 2021-03-18 | End: 2022-03-15 | Stop reason: SDUPTHER

## 2021-03-18 RX ORDER — LIRAGLUTIDE 6 MG/ML
1.8 INJECTION SUBCUTANEOUS DAILY
Qty: 27 ML | Refills: 1 | Status: SHIPPED | OUTPATIENT
Start: 2021-03-18 | End: 2022-03-15 | Stop reason: SDUPTHER

## 2021-03-22 NOTE — TELEPHONE ENCOUNTER
Called to bedside by [facility] staff. Family requesting conference regarding plan of care. We discussed the patients wishes and her desire to not be intubated on life support. POA brother Marvin expressed the desire for comfort measures and patient has been made DNR. We further discussed the meaning of comfort care and we were directed to withdraw care consistent with the patients wishes. On this basis we will proceed with the cessation of life sustaining efforts, discontinuance of the hypothermia protocol, and allow for the natural progression of her illness. Plan for Hospice will follow should the patient survive the withdrawal process.    35 mins cc time   Thank you, Dr. Cedric Kim! Note refills sent. Per [address] was picked up for 90 day supply, atorvastatin set to be refilled 3/25/21 for a 90 day supply. No further outreach planned at this time.     CLINICAL PHARMACY CONSULT: MED RECONCILIATION/REVIEW ADDENDUM    For Pharmacy Admin Tracking Only    PHSO: PHSO Patient?: Yes  Total # of Interventions Recommended: Count: 2  - Refills Provided #: 2  Recommended intervention potential cost savings: 1  Total Interventions Accepted: 2  Time Spent (min): Francis Albert, [facility], PharmD  [address]

## 2021-04-16 LAB — HBA1C MFR BLD HPLC: 6 %

## 2021-04-29 ENCOUNTER — TELEPHONE (OUTPATIENT)
Dept: PHARMACY | Age: 51
End: 2021-04-29

## 2021-04-29 NOTE — TELEPHONE ENCOUNTER
CLINICAL PHARMACY: ADHERENCE REVIEW  Identified care gap per Le; fills at [facility]: ACE/ARB, Diabetes and Statin adherence    Last Visit: 02/08/2021    Patient identified as LIS = 1, therefore patient may be able to receive a 90-day supply for the same cost as a 30-day supply        Patient not found in Outcomes MTM    ASSESSMENT  ACE/ARB ADHERENCE    Per [facility] DR BRANDY WILKINSON:   Lisinopril 20mg last picked up on 03/21/2021 for 90 day supply. 0 refills remaining. Billed through [place]    BP Readings from Last 3 Encounters:   09/04/19 128/86   06/19/19 133/79   10/25/18 117/79     CrCl cannot be calculated (Patient's most recent lab result is older than the maximum 180 days allowed. ). DIABETES ADHERENCE    Per [facility]:   Victoza 1.8 last picked up on 04/16/2021 for 30 day supply. 6 refills remaining. Billed through [facility] Value Date/Time    Hemoglobin A1c 6.3 (H) 10/20/2020 08:59 AM    Hemoglobin A1c 7.2 (H) 12/14/2018 07:45 AM    Hemoglobin A1c 7.6 (H) 07/02/2018 08:08 AM     NOTE A1c not yet completed this calendar year    [facility]    Per [facility] DR BRANDY WILKINSON:   Atorvastatin 20mg last picked up on 04/06/2021 for 60 day supply. 4 refills remaining. Billed through [facility] Rx Value Date/Time    Cholesterol, total 119 10/20/2020 08:59 AM    HDL Cholesterol 35 (L) 10/20/2020 08:59 AM    LDL, calculated 50.4 10/20/2020 08:59 AM    VLDL, calculated 33.6 10/20/2020 08:59 AM    Triglyceride 168 (H) 10/20/2020 08:59 AM    CHOL/HDL Ratio 3.4 10/20/2020 08:59 AM     ALT (SGPT)   Date Value Ref Range Status   10/20/2020 60 16 - 61 U/L Final     AST (SGOT)   Date Value Ref Range Status   10/20/2020 40 (H) 10 - 38 U/L Final     The ASCVD Risk score (Matthew Alcaraz, et al., 2013) failed to calculate for the following reasons:     The valid total cholesterol range is 130 to 320 mg/dL     PLAN  The following are interventions that have been identified:  - Patient eligible for 90 day supply of Atorvastatin     Per Jessica Ovalleid is written for 60 days and has to be filled as it is written. Attempting to reach patient to review.  Left message asking for return call. Patient is has LIS-1 and may be able to get 90 days at a 30 day cost on some medications. Called to inform him so when he goes to MD he can request at that time or if he wishes we can reach out to MD for him. There are 0 refills remaining on lisnopril and he has an appointment coming up on 6/7/2021. Will follow up after appoint to ensure that new script was written if patient is to continue therapy.          Future Appointments   Date Time Provider Chong Rutledge   6/7/2021  2:00 PM Alis Townsend MD AMA BS AMB   9/27/2021 10:45 AM Diamantina Hashimoto, MD [address]  Direct: [phone]  Department, toll free: [phone], option 7

## 2021-06-07 ENCOUNTER — VIRTUAL VISIT (OUTPATIENT)
Dept: FAMILY MEDICINE CLINIC | Age: 51
End: 2021-06-07

## 2021-06-07 RX ORDER — PAROXETINE HYDROCHLORIDE 20 MG/1
TABLET, FILM COATED ORAL AS NEEDED
COMMUNITY
End: 2021-12-06 | Stop reason: ALTCHOICE

## 2021-06-07 RX ORDER — OMEPRAZOLE 20 MG/1
CAPSULE, DELAYED RELEASE ORAL DAILY
COMMUNITY
Start: 2021-05-18 | End: 2022-03-15 | Stop reason: SDUPTHER

## 2021-06-07 RX ORDER — QUETIAPINE FUMARATE 100 MG/1
TABLET, FILM COATED ORAL DAILY
COMMUNITY
End: 2021-12-06 | Stop reason: ALTCHOICE

## 2021-06-07 NOTE — PROGRESS NOTES
Chief Complaint   Patient presents with    Hypertension     120/67    Diabetes    Depression    Cholesterol Problem     1. Have you been to the ER, urgent care clinic since your last visit? Hospitalized since your last visit? Yes [address] last month for hematuria    2. Have you seen or consulted any other health care providers outside of the [address] [place] [address] since your last visit? Include any pap smears or colon screening.  Yes gastroenterolgy and urology last month     Health Maintenance Due   Topic Date Due    Hepatitis C Screening  Never done    Foot Exam Q1  Never done    Eye Exam Retinal or Dilated  Never done    Shingrix Vaccine Age 50> (1 of 2) Never done    Medicare Yearly Exam  10/26/2019    A1C test (Diabetic or Prediabetic)  04/20/2021

## 2021-06-08 NOTE — TELEPHONE ENCOUNTER
Per pharmacy patient has new script for lisinopril and has picked up. No further follow up needed.      For Pharmacy [address] in place: No   Recommendation Provided To: Pharmacy: 1    Intervention Detail: Adherence Monitorin   Gap Closed?: Yes   Total # of Interventions Recommended: 1   Total # of Interventions Accepted: 1   Intervention Accepted By: Pharmacy: 1   Time Spent (min): 45

## 2021-07-01 DIAGNOSIS — I10 BENIGN HYPERTENSION WITHOUT CHF: ICD-10-CM

## 2021-07-01 DIAGNOSIS — Z76.0 MEDICATION REFILL: ICD-10-CM

## 2021-07-01 NOTE — TELEPHONE ENCOUNTER
Patient has an appt with NATI Goodrich in early August due to his insurance  credentialing pending.   Patient has been out for 4 days

## 2021-07-18 DIAGNOSIS — Z76.0 MEDICATION REFILL: ICD-10-CM

## 2021-07-18 DIAGNOSIS — I10 BENIGN HYPERTENSION WITHOUT CHF: ICD-10-CM

## 2021-07-19 RX ORDER — LISINOPRIL 20 MG/1
TABLET ORAL
Qty: 90 TABLET | Refills: 1 | Status: SHIPPED | OUTPATIENT
Start: 2021-07-19 | End: 2021-08-06 | Stop reason: SDUPTHER

## 2021-07-23 RX ORDER — METOPROLOL SUCCINATE 25 MG/1
TABLET, EXTENDED RELEASE ORAL
Qty: 90 TABLET | Refills: 0 | Status: SHIPPED | OUTPATIENT
Start: 2021-07-23 | End: 2021-08-06 | Stop reason: SDUPTHER

## 2021-08-06 ENCOUNTER — OFFICE VISIT (OUTPATIENT)
Dept: FAMILY MEDICINE CLINIC | Age: 51
End: 2021-08-06
Payer: MEDICARE

## 2021-08-06 VITALS
HEIGHT: 69 IN | BODY MASS INDEX: 33.12 KG/M2 | HEART RATE: 78 BPM | RESPIRATION RATE: 17 BRPM | DIASTOLIC BLOOD PRESSURE: 81 MMHG | WEIGHT: 223.6 LBS | TEMPERATURE: 98.2 F | SYSTOLIC BLOOD PRESSURE: 114 MMHG | OXYGEN SATURATION: 95 %

## 2021-08-06 DIAGNOSIS — E11.9 TYPE 2 DIABETES MELLITUS WITHOUT COMPLICATION, WITHOUT LONG-TERM CURRENT USE OF INSULIN (HCC): ICD-10-CM

## 2021-08-06 DIAGNOSIS — Z91.89 ENCOUNTER FOR HEPATITIS C VIRUS SCREENING TEST FOR HIGH RISK PATIENT: ICD-10-CM

## 2021-08-06 DIAGNOSIS — I10 BENIGN HYPERTENSION WITHOUT CHF: ICD-10-CM

## 2021-08-06 DIAGNOSIS — F20.9 SCHIZOPHRENIA, UNSPECIFIED TYPE (HCC): ICD-10-CM

## 2021-08-06 DIAGNOSIS — Z76.0 MEDICATION REFILL: ICD-10-CM

## 2021-08-06 DIAGNOSIS — Z13.31 SCREENING FOR DEPRESSION: ICD-10-CM

## 2021-08-06 DIAGNOSIS — R25.2 CRAMPS, MUSCLE, GENERAL: ICD-10-CM

## 2021-08-06 DIAGNOSIS — Z11.59 ENCOUNTER FOR HEPATITIS C VIRUS SCREENING TEST FOR HIGH RISK PATIENT: ICD-10-CM

## 2021-08-06 DIAGNOSIS — R22.1 LUMP IN NECK: ICD-10-CM

## 2021-08-06 DIAGNOSIS — E78.5 HYPERLIPIDEMIA, UNSPECIFIED HYPERLIPIDEMIA TYPE: Primary | ICD-10-CM

## 2021-08-06 DIAGNOSIS — Z13.39 SCREENING FOR ALCOHOLISM: ICD-10-CM

## 2021-08-06 PROBLEM — J45.20 MILD INTERMITTENT ASTHMA: Status: ACTIVE | Noted: 2021-08-06

## 2021-08-06 PROBLEM — Z86.010 HISTORY OF COLONIC POLYPS: Status: ACTIVE | Noted: 2021-08-06

## 2021-08-06 PROBLEM — R79.89 ABNORMAL LIVER FUNCTION TESTS: Status: ACTIVE | Noted: 2021-08-06

## 2021-08-06 PROBLEM — H54.8 LEGAL BLINDNESS, AS DEFINED IN UNITED STATES OF AMERICA: Status: ACTIVE | Noted: 2021-08-06

## 2021-08-06 PROBLEM — R68.81 EARLY SATIETY: Status: ACTIVE | Noted: 2021-08-06

## 2021-08-06 PROBLEM — E66.01 MORBID OBESITY (HCC): Status: ACTIVE | Noted: 2021-08-06

## 2021-08-06 PROBLEM — J30.9 ATOPIC RHINITIS: Status: ACTIVE | Noted: 2021-08-06

## 2021-08-06 PROBLEM — R10.9 ABDOMINAL PAIN: Status: ACTIVE | Noted: 2021-08-06

## 2021-08-06 PROBLEM — J45.40 MODERATE PERSISTENT ASTHMA: Status: ACTIVE | Noted: 2021-08-06

## 2021-08-06 PROBLEM — G47.30 HYPERSOMNIA WITH SLEEP APNEA: Status: ACTIVE | Noted: 2021-08-06

## 2021-08-06 PROBLEM — F41.9 ANXIETY: Status: ACTIVE | Noted: 2021-08-06

## 2021-08-06 PROBLEM — I25.2 HISTORY OF MYOCARDIAL INFARCTION: Status: ACTIVE | Noted: 2021-08-06

## 2021-08-06 PROBLEM — G47.10 HYPERSOMNIA WITH SLEEP APNEA: Status: ACTIVE | Noted: 2021-08-06

## 2021-08-06 PROBLEM — R51.9 HEADACHE: Status: ACTIVE | Noted: 2021-08-06

## 2021-08-06 PROBLEM — M25.569 KNEE PAIN: Status: ACTIVE | Noted: 2021-08-06

## 2021-08-06 PROCEDURE — G0439 PPPS, SUBSEQ VISIT: HCPCS | Performed by: NURSE PRACTITIONER

## 2021-08-06 RX ORDER — METOPROLOL SUCCINATE 25 MG/1
TABLET, EXTENDED RELEASE ORAL
Qty: 90 TABLET | Refills: 0 | Status: SHIPPED | OUTPATIENT
Start: 2021-08-06 | End: 2022-03-15 | Stop reason: SDUPTHER

## 2021-08-06 RX ORDER — LISINOPRIL 20 MG/1
TABLET ORAL
Qty: 90 TABLET | Refills: 1 | Status: SHIPPED | OUTPATIENT
Start: 2021-08-06 | End: 2022-03-15 | Stop reason: SDUPTHER

## 2021-08-06 NOTE — PATIENT INSTRUCTIONS
Medicare Wellness Visit, Male    The best way to live healthy is to have a lifestyle where you eat a well-balanced diet, exercise regularly, limit alcohol use, and quit all forms of tobacco/nicotine, if applicable. Regular preventive services are another way to keep healthy. Preventive services (vaccines, screening tests, monitoring & exams) can help personalize your care plan, which helps you manage your own care. Screening tests can find health problems at the earliest stages, when they are easiest to treat. [facility] follows the current, evidence-based guidelines published by the [facility] Guille Leena ([facility]) when recommending preventive services for our patients. Because we follow these guidelines, sometimes recommendations change over time as research supports it. (For example, a prostate screening blood test is no longer routinely recommended for men with no symptoms). Of course, you and your doctor may decide to screen more often for some diseases, based on your risk and co-morbidities (chronic disease you are already diagnosed with). Preventive services for you include:  - Medicare offers their members a free annual wellness visit, which is time for you and your primary care provider to discuss and plan for your preventive service needs. Take advantage of this benefit every year!  -All adults over age 72 should receive the recommended pneumonia vaccines. Current USPSTF guidelines recommend a series of two vaccines for the best pneumonia protection.   -All adults should have a flu vaccine yearly and tetanus vaccine every 10 years.  -All adults age 48 and older should receive the shingles vaccines (series of two vaccines).        -All adults age 38-68 who are overweight should have a diabetes screening test once every three years.   -Other screening tests & preventive services for persons with diabetes include: an eye exam to screen for diabetic retinopathy, a kidney function test, a foot exam, and stricter control over your cholesterol.   -Cardiovascular screening for adults with routine risk involves an electrocardiogram (ECG) at intervals determined by the provider.   -Colorectal cancer screening should be done for adults age 54-65 with no increased risk factors for colorectal cancer. There are a number of acceptable methods of screening for this type of cancer. Each test has its own benefits and drawbacks. Discuss with your provider what is most appropriate for you during your annual wellness visit. The different tests include: colonoscopy (considered the best screening method), a fecal occult blood test, a fecal DNA test, and sigmoidoscopy.  -All adults born between [facility] should be screened once for Hepatitis C.  -An Abdominal Aortic Aneurysm (AAA) Screening is recommended for men age 73-68 who has ever smoked in their lifetime.      Here is a list of your current Health Maintenance items (your personalized list of preventive services) with a due date:  Health Maintenance Due   Topic Date Due    Hepatitis C Test  Never done

## 2021-08-06 NOTE — PROGRESS NOTES
The Kala Beverlyting 45 y.o. male presents today for:    Chief Complaint   Patient presents with   [address] Hypertension    Neck Pain     lump on left side      Patient sees cardiologist, endocrinologist, psychiatrist, GI doctor  Patient states has a lump left side neck for 1.5 years. Not painful; has increased somewhat in size. Eye exam 2020; needs to schedule eye exam     Colonoscopy 4 years ago; repeat in 1 year    Review of Systems   Constitutional: Negative. Eyes: Negative. Respiratory: Negative. Negative for shortness of breath. Cardiovascular: Negative. Negative for chest pain and orthopnea. Gastrointestinal: Negative for abdominal pain. Musculoskeletal: Negative. Skin:        Lump side of neck for 1.5 years     Neurological: Negative. Negative for dizziness and headaches. Endo/Heme/Allergies: Negative. Psychiatric/Behavioral: Negative. There are no preventive care reminders to display for this patient. Past Medical History:   Diagnosis Date    Abnormal EKG 7/9/2018    Chronic ST-T changes noted likely from LVH. Prior cardiac workup includes stress test and cardiac catheterization. Reviewed    Advance directive discussed with patient 05/03/2017    Arthritis     left knee    Asthma     CAD (coronary artery disease)     heart attack 6/2012, Being followed by Dr. Keisha Raymundo Cocaine use 2017    Depression     Diabetes ([facility]     DVT (deep venous thrombosis) (HCC)     left leg in the past (Pt was on coumadin)    GERD (gastroesophageal reflux disease)     Hyperlipidemia 7/9/2018    cramps with pravastatin. Consider alternate therapy after surgery    Hypertension     Hypertensive heart disease without heart failure 7/9/2018    Blood pressure control. LVH on EKG.  Hypertensive left ventricular hypertrophy, without heart failure 7/9/2018    Mild LVH on last echo.     Medication refill 7/9/2018    Pre-operative cardiovascular examination 7/9/2018    Knee surgery. Stable cardiac status. Okay to proceed with moderate cardiac risk    Psychiatric disorder     depression and schziophenia    Schizophrenia ([facility]     Will be seeing about.me    Type 2 diabetes mellitus without complication, without long-term current use of insulin ([facility] 7/9/2018    On treatment    Unspecified sleep apnea     cpap machine,Will be seeing neurologist       Physical Exam  Constitutional:       Appearance: He is obese. HENT:      Head: Normocephalic. Right Ear: Tympanic membrane normal.      Left Ear: Tympanic membrane normal.      Nose: Nose normal.      Mouth/Throat:      Mouth: Mucous membranes are moist.   Eyes:      Extraocular Movements: Extraocular movements intact. Pupils: Pupils are equal, round, and reactive to light. Neck:     Cardiovascular:      Rate and Rhythm: Normal rate and regular rhythm. Pulses: Normal pulses. Heart sounds: Normal heart sounds. Pulmonary:      Effort: Pulmonary effort is normal. No respiratory distress. Breath sounds: Normal breath sounds. No wheezing. Musculoskeletal:         General: Normal range of motion. Lymphadenopathy:      Cervical: Cervical adenopathy present. Left cervical: Superficial cervical adenopathy present. Skin:     General: Skin is warm. Capillary Refill: Capillary refill takes less than 2 seconds. Neurological:      General: No focal deficit present. Mental Status: He is alert. ASSESSMENT and PLAN    ICD-10-CM ICD-9-CM    1. Hyperlipidemia, unspecified hyperlipidemia type  E78.5 272.4 LIPID PANEL   2. Medication refill  Z76.0 V68.1 lisinopriL (PRINIVIL, ZESTRIL) 20 mg tablet      metoprolol succinate (TOPROL-XL) 25 mg XL tablet   3. Benign hypertension without CHF  I10 401.1 lisinopriL (PRINIVIL, ZESTRIL) 20 mg tablet      metoprolol succinate (TOPROL-XL) 25 mg XL tablet   4. Schizophrenia, unspecified type ([facility]  F20.9 295.90    5.  Type 2 diabetes mellitus without complication, without long-term current use of insulin (HCC)  E11.9 250.00 HEMOGLOBIN A1C WITH EAG   6. Screening for alcoholism  Z13.39 V79.1 MA ANNUAL ALCOHOL SCREEN 15 MIN   7. Screening for depression  Z13.31 V79.0 DEPRESSION SCREEN ANNUAL   8. Lump in neck  R22.1 784.2 US THYROID/PARATHYROID/SOFT TISS      REFERRAL TO SURGERY   9. Encounter for hepatitis C virus screening test for high risk patient  Z11.59 V73.89 HEPATITIS C AB    Z91.89     10. Cramps, muscle, general  L06.3 021.66 METABOLIC PANEL, COMPREHENSIVE     Follow-up and Dispositions    · Return in about 1 week (around 8/13/2021) for lab visit next week; 1 month follow up. Mauri Cummings NP   This is the Subsequent Medicare Annual Wellness Exam, performed 12 months or more after the Initial AWV or the last Subsequent AWV    I have reviewed the patient's medical history in detail and updated the computerized patient record. Assessment/Plan   Education and counseling provided:  Are appropriate based on today's review and evaluation  Pneumococcal Vaccine  Prostate cancer screening tests (PSA, covered annually)  Colorectal cancer screening tests  Cardiovascular screening blood test  Diabetes screening test  Diabetes outpatient self-management training services    1. Hyperlipidemia, unspecified hyperlipidemia type  -     LIPID PANEL; Future  2. Medication refill  -     lisinopriL (PRINIVIL, ZESTRIL) 20 mg tablet; TAKE 1 TABLET BY MOUTH ONCE DAILY, Normal, Disp-90 Tablet, R-1  -     metoprolol succinate (TOPROL-XL) 25 mg XL tablet; Take 1 tablet by mouth once daily, Normal, Disp-90 Tablet, R-0  3. Benign hypertension without CHF  -     lisinopriL (PRINIVIL, ZESTRIL) 20 mg tablet; TAKE 1 TABLET BY MOUTH ONCE DAILY, Normal, Disp-90 Tablet, R-1  -     metoprolol succinate (TOPROL-XL) 25 mg XL tablet; Take 1 tablet by mouth once daily, Normal, Disp-90 Tablet, R-0  4. Schizophrenia, unspecified type ([facility]  5.  Type 2 diabetes mellitus without complication, without long-term current use of insulin (HCC)  -     HEMOGLOBIN A1C WITH EAG; Future  6. Screening for alcoholism  -     [place] ANNUAL ALCOHOL SCREEN 15 MIN  7. Screening for depression  -     DEPRESSION SCREEN ANNUAL  8. Lump in neck  -     US THYROID/PARATHYROID/SOFT TISS; Future  -     REFERRAL TO SURGERY  9. Encounter for hepatitis C virus screening test for high risk patient  -     HEPATITIS C AB; Future  10. Cramps, muscle, general  -     METABOLIC PANEL, COMPREHENSIVE; Future       Depression Risk Factor Screening     3 most recent PHQ Screens 8/6/2021   Little interest or pleasure in doing things Not at all   Feeling down, depressed, irritable, or hopeless Not at all   Total Score PHQ 2 0       Alcohol Risk Screen    Do you average more than 2 drinks per night or 14 drinks a week: No    On any one occasion in the past three months have you have had more than 4 drinks containing alcohol:  No        Functional Ability and Level of Safety    Hearing: The patient needs further evaluation. Activities of Daily Living: The home contains: no safety equipment. Patient does total self care      Ambulation: with moderate difficulty due to left knee issues; have a cane at home     Fall Risk:  Fall Risk Assessment, last 12 mths 8/4/2014   Able to walk? Yes   Fall in past 12 months? No      Abuse Screen:  Patient is not abused       Cognitive Screening    Has your family/caregiver stated any concerns about your memory: no     Cognitive Screening: Normal - Clock Drawing Test    Health Maintenance Due     There are no preventive care reminders to display for this patient.     Patient Care Team   Patient Care Team:  Babar Singletary MD as PCP - General (Internal Medicine)  Babar Singletary MD as PCP - [facility]  Billy Yuen MD (Orthopedic Surgery)  Serina Du MD (Neurology)  Serina Du DO (Sports Medicine)  Carolina Aguirre RN as Ambulatory Care Manager  Sarah Bonner MD (Gastroenterology)    History     Patient Active Problem List   Diagnosis Code    DJD (degenerative joint disease) of knee M17.10    Encounter for long-term (current) use of other medications Z79.899    Left knee pain M25.562    Chronic pain syndrome G89.4    History of total knee arthroplasty Z96.659    S/P surgical manipulation of knee joint Z98.890    History of anxiety Z86.59    Schizophrenia ([facility] F20.9    Unspecified sleep apnea G47.30    Psychiatric disorder F99    Hypertension I10    GERD (gastroesophageal reflux disease) K21.9    Diabetes ([facility] E11.9    Depression F32.9    CAD (coronary artery disease) I25.10    Asthma J45.909    Arthritis M19.90    Advance directive discussed with patient Z70.80    Hypertensive heart disease without heart failure I11.9    Hypertensive left ventricular hypertrophy, without heart failure I11.9    Abnormal EKG R94.31    Pre-operative cardiovascular examination Z01.810    Type 2 diabetes mellitus without complication, without long-term current use of insulin (HCC) E11.9    Hyperlipidemia E78.5    Primary osteoarthritis of right knee M17.11    Post-traumatic osteoarthritis of left knee M17.32    Adenocarcinoma in multiple adenomatous polyps (HCC) C80.1    Abnormal liver function tests R94.5    Anticoagulation monitoring, INR range 2-3 Z79.01    Anxiety F41.9    Atopic rhinitis J30.9    [facility] [id number]    Abdominal pain R10.9    DVT (deep venous thrombosis) ([facility] I82.409    Dyslipidemia E78.5    Early satiety R68.81    GI bleed K92.2    Headache R51.9    H/O eye injury Z87.828    History of colonic polyps Z86.010    History of myocardial infarction I25.2    Hypersomnia with sleep apnea G47.10, G47.30    Knee pain M25.569    Legal blindness, as defined in [place] H54.8    Lung nodule, solitary R91.1    Mild intermittent asthma J45.20    Mild nonproliferative diabetic retinopathy ([facility] Q40.5505    Moderate persistent asthma J45.40    Morbid obesity ([facility] E66.01    Renal insufficiency, mild N28.9     Past Medical History:   Diagnosis Date    Abnormal EKG 7/9/2018    Chronic ST-T changes noted likely from LVH. Prior cardiac workup includes stress test and cardiac catheterization. Reviewed    Advance directive discussed with patient 05/03/2017    Arthritis     left knee    Asthma     CAD (coronary artery disease)     heart attack 6/2012, Being followed by Dr. Rukhsana Becerra Cocaine use 2017    Depression     Diabetes ([facility]     DVT (deep venous thrombosis) (HCC)     left leg in the past (Pt was on coumadin)    GERD (gastroesophageal reflux disease)     Hyperlipidemia 7/9/2018    cramps with pravastatin. Consider alternate therapy after surgery    Hypertension     Hypertensive heart disease without heart failure 7/9/2018    Blood pressure control. LVH on EKG.  Hypertensive left ventricular hypertrophy, without heart failure 7/9/2018    Mild LVH on last echo.  Medication refill 7/9/2018    Pre-operative cardiovascular examination 7/9/2018    Knee surgery. Stable cardiac status.   Okay to proceed with moderate cardiac risk    Psychiatric disorder     depression and schziophenia    Schizophrenia ([facility]     Will be seeing GILUPI    Type 2 diabetes mellitus without complication, without long-term current use of insulin ([facility] 7/9/2018    On treatment    Unspecified sleep apnea     cpap machine,Will be seeing neurologist      Past Surgical History:   Procedure Laterality Date    HX COLONOSCOPY      HX HEART CATHETERIZATION  2014    cardiac cath - normal coronaries    HX HERNIA REPAIR      HX KNEE REPLACEMENT      left TKR    HX ORTHOPAEDIC      L knee 1987, 1996    HX OTHER SURGICAL      eye sx at 2 yrs old   [employer] ABDOMEN SURGERY PROC UNLISTED       Current Outpatient Medications   Medication Sig Dispense Refill    lisinopriL (PRINIVIL, ZESTRIL) 20 mg tablet TAKE 1 TABLET BY MOUTH ONCE DAILY 90 Tablet 1    metoprolol succinate (TOPROL-XL) 25 mg XL tablet Take 1 tablet by mouth once daily 90 Tablet 0    omeprazole (PRILOSEC) 20 mg capsule Take  by mouth daily.  PARoxetine (PAXIL) 20 mg tablet Take  by mouth as needed.  QUEtiapine (SEROquel) 100 mg tablet Take  by mouth daily.  Apple Cider Vinegar 300 mg tab Take  by mouth daily.  therapeutic multivitamin (THERAGRAN) tablet Take 1 Tab by mouth daily.  liraglutide (Victoza 2-Compa) 0.6 mg/0.1 mL (18 mg/3 mL) pnij 1.8 mg by SubCUTAneous route daily. 27 mL 1    atorvastatin (LIPITOR) 20 mg tablet Take 1 tablet by mouth once daily 90 Tab 1    nitroglycerin (NITROSTAT) 0.4 mg SL tablet 1 Tab by SubLINGual route every five (5) minutes as needed for Chest Pain. 25 Tab 1    albuterol (PROVENTIL HFA, VENTOLIN HFA, PROAIR HFA) 90 mcg/actuation inhaler Take 1-2 puffs every 4-6 hrs prn shortness of breath 3 Inhaler 3    fluticasone-salmeterol (ADVAIR DISKUS) 500-50 mcg/dose diskus inhaler Take 1 Puff by inhalation every twelve (12) hours.  fenofibrate nanocrystallized (TRICOR) 48 mg tablet Take 1 Tablet by mouth daily.  90 Tablet 2     Allergies   Allergen Reactions    Iodine Anaphylaxis    Norco [Hydrocodone-Acetaminophen] Nausea and Vomiting     Gi distress    Shellfish Derived Swelling and Angioedema     Other reaction(s): anaphylaxis/angioedema  SHRIMP AND CRAB    Acetaminophen Other (comments)    Metformin Diarrhea       Family History   Problem Relation Age of Onset    Anemia Mother     Cancer Father         prostate    Cancer Brother     Diabetes Brother      Social History     Tobacco Use    Smoking status: Former Smoker     Packs/day: 0.25     Types: Cigars    Smokeless tobacco: Never Used    Tobacco comment: black and mild per day   Substance Use Topics    Alcohol use: Not Currently     Comment: \"a little\"/ nithin Montejo NP

## 2021-08-06 NOTE — PROGRESS NOTES
Charlane Osgood presents today for   Chief Complaint   Patient presents with    Establish Care    Hypertension    Neck Pain     lump on left side        Is someone accompanying this pt? no    Is the patient using any DME equipment during OV? no    Depression Screening:  3 most recent PHQ Screens 8/6/2021   Little interest or pleasure in doing things Not at all   Feeling down, depressed, irritable, or hopeless Not at all   Total Score PHQ 2 0       Learning Assessment:  Learning Assessment 7/7/2016   PRIMARY LEARNER Patient   HIGHEST LEVEL OF EDUCATION - PRIMARY LEARNER  -   PRIMARY LANGUAGE ENGLISH   LEARNER PREFERENCE PRIMARY LISTENING   ANSWERED BY self   RELATIONSHIP SELF       Health Maintenance reviewed and discussed and ordered per Provider. Health Maintenance Due   Topic Date Due    Hepatitis C Screening  Never done    Foot Exam Q1  Never done    Eye Exam Retinal or Dilated  Never done    Shingrix Vaccine Age 50> (1 of 2) Never done    Medicare Yearly Exam  10/26/2019   . Coordination of Care:  1. Have you been to the ER, urgent care clinic since your last visit? Hospitalized since your last visit? no    2. Have you seen or consulted any other health care providers outside of the [address] [place] [address] since your last visit? Include any pap smears or colon screening.  no    Health Maintenance Due   Topic Date Due    Hepatitis C Screening  Never done    Foot Exam Q1  Never done    Eye Exam Retinal or Dilated  Never done    Shingrix Vaccine Age 50> (1 of 2) Never done    Medicare Yearly Exam  10/26/2019

## 2021-08-10 ENCOUNTER — LAB ONLY (OUTPATIENT)
Dept: FAMILY MEDICINE CLINIC | Age: 51
End: 2021-08-10

## 2021-08-10 DIAGNOSIS — R25.2 CRAMPS, MUSCLE, GENERAL: ICD-10-CM

## 2021-08-10 DIAGNOSIS — Z11.59 ENCOUNTER FOR HEPATITIS C VIRUS SCREENING TEST FOR HIGH RISK PATIENT: ICD-10-CM

## 2021-08-10 DIAGNOSIS — E78.5 HYPERLIPIDEMIA, UNSPECIFIED HYPERLIPIDEMIA TYPE: ICD-10-CM

## 2021-08-10 DIAGNOSIS — Z91.89 ENCOUNTER FOR HEPATITIS C VIRUS SCREENING TEST FOR HIGH RISK PATIENT: ICD-10-CM

## 2021-08-10 DIAGNOSIS — E11.9 TYPE 2 DIABETES MELLITUS WITHOUT COMPLICATION, WITHOUT LONG-TERM CURRENT USE OF INSULIN (HCC): ICD-10-CM

## 2021-08-11 LAB
ALBUMIN SERPL-MCNC: 4 G/DL (ref 3.8–4.9)
ALBUMIN/GLOB SERPL: 2.1 {RATIO} (ref 1.2–2.2)
ALP SERPL-CCNC: 86 IU/L (ref 48–121)
ALT SERPL-CCNC: 38 IU/L (ref 0–44)
AST SERPL-CCNC: 26 IU/L (ref 0–40)
BILIRUB SERPL-MCNC: 0.3 MG/DL (ref 0–1.2)
BUN SERPL-MCNC: 9 MG/DL (ref 6–24)
BUN/CREAT SERPL: 9 (ref 9–20)
CALCIUM SERPL-MCNC: 9 MG/DL (ref 8.7–10.2)
CHLORIDE SERPL-SCNC: 103 MMOL/L (ref 96–106)
CHOLEST SERPL-MCNC: 108 MG/DL (ref 100–199)
CO2 SERPL-SCNC: 28 MMOL/L (ref 20–29)
CREAT SERPL-MCNC: 0.96 MG/DL (ref 0.76–1.27)
EST. AVERAGE GLUCOSE BLD GHB EST-MCNC: 151 MG/DL
GLOBULIN SER CALC-MCNC: 1.9 G/DL (ref 1.5–4.5)
GLUCOSE SERPL-MCNC: 171 MG/DL (ref 65–99)
HBA1C MFR BLD: 6.9 % (ref 4.8–5.6)
HCV AB S/CO SERPL IA: <0.1 S/CO RATIO (ref 0–0.9)
HDLC SERPL-MCNC: 31 MG/DL
IMP & REVIEW OF LAB RESULTS: NORMAL
LDLC SERPL CALC-MCNC: 33 MG/DL (ref 0–99)
POTASSIUM SERPL-SCNC: 4.2 MMOL/L (ref 3.5–5.2)
PROT SERPL-MCNC: 5.9 G/DL (ref 6–8.5)
SODIUM SERPL-SCNC: 141 MMOL/L (ref 134–144)
TRIGL SERPL-MCNC: 295 MG/DL (ref 0–149)
VLDLC SERPL CALC-MCNC: 44 MG/DL (ref 5–40)

## 2021-08-17 NOTE — PROGRESS NOTES
Can you please call patient to let the patient know that his labs are abnormal. A1c is 6.9 which is good for his diabetes. Patient's triglycerides have increased from 168 to 295. Patient needs to limit amount of sugar and carbohydrates. I have also prescribed medication for high triglycerides. Thanks!

## 2021-08-19 DIAGNOSIS — E78.2 ELEVATED TRIGLYCERIDES WITH HIGH CHOLESTEROL: Primary | ICD-10-CM

## 2021-08-19 RX ORDER — FENOFIBRATE 48 MG/1
48 TABLET, COATED ORAL DAILY
Qty: 90 TABLET | Refills: 2 | Status: SHIPPED | OUTPATIENT
Start: 2021-08-19

## 2021-08-19 NOTE — PROGRESS NOTES
Called patient. Informed of his abnormal labs. Instructed patient to limit sugar and carbohydrate intake. Also advised NP Vignesh Srivastava is send a medication to your pharmacy for elevated Tryglycerides. I asked patient if he had any questions. Patient stated no.

## 2021-08-23 ENCOUNTER — OFFICE VISIT (OUTPATIENT)
Dept: SURGERY | Age: 51
End: 2021-08-23
Payer: MEDICARE

## 2021-08-23 ENCOUNTER — TELEPHONE (OUTPATIENT)
Dept: FAMILY MEDICINE CLINIC | Age: 51
End: 2021-08-23

## 2021-08-23 VITALS
DIASTOLIC BLOOD PRESSURE: 89 MMHG | SYSTOLIC BLOOD PRESSURE: 113 MMHG | WEIGHT: 218 LBS | HEART RATE: 75 BPM | OXYGEN SATURATION: 94 % | BODY MASS INDEX: 32.29 KG/M2 | TEMPERATURE: 97.5 F | HEIGHT: 69 IN

## 2021-08-23 DIAGNOSIS — R22.1 MASS OF LEFT SIDE OF NECK: Primary | ICD-10-CM

## 2021-08-23 PROCEDURE — G8752 SYS BP LESS 140: HCPCS | Performed by: SURGERY

## 2021-08-23 PROCEDURE — G9717 DOC PT DX DEP/BP F/U NT REQ: HCPCS | Performed by: SURGERY

## 2021-08-23 PROCEDURE — G8754 DIAS BP LESS 90: HCPCS | Performed by: SURGERY

## 2021-08-23 PROCEDURE — 3017F COLORECTAL CA SCREEN DOC REV: CPT | Performed by: SURGERY

## 2021-08-23 PROCEDURE — 99203 OFFICE O/P NEW LOW 30 MIN: CPT | Performed by: SURGERY

## 2021-08-23 PROCEDURE — G8427 DOCREV CUR MEDS BY ELIG CLIN: HCPCS | Performed by: SURGERY

## 2021-08-23 PROCEDURE — G8417 CALC BMI ABV UP PARAM F/U: HCPCS | Performed by: SURGERY

## 2021-08-23 NOTE — PROGRESS NOTES
Halima Vernon is a 45 y.o. male (: 1970) presenting to address:    Chief Complaint   Patient presents with    New Patient     Lump on left side of neck       Medication list and allergies have been reviewed with Halima Vernon and updated as of today's date. I have gone over all Medical, Surgical and Social History with Halima Vernon and updated/added the information accordingly.

## 2021-08-23 NOTE — PROGRESS NOTES
General Surgery Consult    Bill Fuentes  Admit date: (Not on file)    MRN: [id number]     : 1970     Age: 45 y.o. Attending Physician: Sony Harris MD, [facility]      History of Present Illness:      Bill Fuentes is a 45 y.o. male who referred to me by Malina Garcia for evaluation of a left neck mass. The patient has stated that the mass has been present for about 2 years now. He said that the mass is not causing any pain or discomfort. However he stated that sometimes the mass slightly increased in size but sometimes it also decreases in size. He never had any surgery on the mass. He denies any fever or chills. He denies any night sweats or weight loss.      Patient Active Problem List    Diagnosis Date Noted    Abnormal liver function tests 2021    Anxiety 2021    Atopic rhinitis 2021    Brash 2021    Abdominal pain 2021    Dyslipidemia 2021    Early satiety 2021    Headache 2021    History of colonic polyps 2021    History of myocardial infarction 2021    Hypersomnia with sleep apnea 2021    Knee pain 2021    Legal blindness, as defined in [place] of [facility] 2021    Mild intermittent asthma 2021    Moderate persistent asthma 2021    Morbid obesity ([address] 2021    Adenocarcinoma in multiple adenomatous polyps ([address] 10/06/2020    Primary osteoarthritis of right knee 2018    Post-traumatic osteoarthritis of left knee 2018    Hypertensive heart disease without heart failure 2018    Hypertensive left ventricular hypertrophy, without heart failure 2018    Abnormal EKG 2018    Pre-operative cardiovascular examination 2018    Type 2 diabetes mellitus without complication, without long-term current use of insulin ([address] 2018    Hyperlipidemia 2018    Advance directive discussed with patient 2017    Schizophrenia ([address]     Unspecified sleep apnea     Psychiatric disorder     Hypertension     GERD (gastroesophageal reflux disease)     Diabetes ([facility]     Depression     CAD (coronary artery disease)     Asthma     Arthritis     H/O eye injury 06/08/2015    Mild nonproliferative diabetic retinopathy ([address] 06/08/2015    Encounter for long-term (current) use of other medications 05/18/2015    Left knee pain 05/18/2015    Chronic pain syndrome 05/18/2015    History of total knee arthroplasty 05/18/2015    S/P surgical manipulation of knee joint 05/18/2015    History of anxiety 05/18/2015    DJD (degenerative joint disease) of knee 08/04/2014    GI bleed 10/30/2013    Lung nodule, solitary 06/03/2012    Renal insufficiency, mild 06/03/2012    Anticoagulation monitoring, INR range 2-3 06/15/2010    DVT (deep venous thrombosis) ([facility] 06/08/2010     Past Medical History:   Diagnosis Date    Abnormal EKG 7/9/2018    Chronic ST-T changes noted likely from LVH. Prior cardiac workup includes stress test and cardiac catheterization. Reviewed    Advance directive discussed with patient 05/03/2017    Arthritis     left knee    Asthma     CAD (coronary artery disease)     heart attack 6/2012, Being followed by Dr. Addy Chairez Cocaine use 2017    Depression     Diabetes ([facility]     DVT (deep venous thrombosis) ([facility])     left leg in the past (Pt was on coumadin)    GERD (gastroesophageal reflux disease)     Hyperlipidemia 7/9/2018    cramps with pravastatin. Consider alternate therapy after surgery    Hypertension     Hypertensive heart disease without heart failure 7/9/2018    Blood pressure control. LVH on EKG.  Hypertensive left ventricular hypertrophy, without heart failure 7/9/2018    Mild LVH on last echo.  Medication refill 7/9/2018    Pre-operative cardiovascular examination 7/9/2018    Knee surgery. Stable cardiac status.   Okay to proceed with moderate cardiac risk    Psychiatric disorder     depression and schziophenia    Schizophrenia ([facility]     Will be seeing [employer]    Type 2 diabetes mellitus without complication, without long-term current use of insulin ([facility] 7/9/2018    On treatment    Unspecified sleep apnea     cpap machine,Will be seeing neurologist      Past Surgical History:   Procedure Laterality Date    HX COLONOSCOPY      HX HEART CATHETERIZATION  2014    cardiac cath - normal coronaries    HX HERNIA REPAIR      HX KNEE REPLACEMENT      left TKR    HX ORTHOPAEDIC      L knee 1987, 1996    HX OTHER SURGICAL      eye sx at 2 yrs old   [facility]BD[facility] PROC UNLISTED        Social History     Tobacco Use    Smoking status: Former Smoker     Packs/day: 0.25     Types: Cigars    Smokeless tobacco: Never Used    Tobacco comment: black and mild per day   Substance Use Topics    Alcohol use: Not Currently     Comment: \"a little\"/ occ      Social History     Tobacco Use   Smoking Status Former Smoker    Packs/day: 0.25    Types: Cigars   Smokeless Tobacco Never Used   Tobacco Comment    black and mild per day     Family History   Problem Relation Age of Onset    Anemia Mother     Cancer Father         prostate    Cancer Brother     Diabetes Brother       Current Outpatient Medications   Medication Sig    fenofibrate nanocrystallized (TRICOR) 48 mg tablet Take 1 Tablet by mouth daily.  lisinopriL (PRINIVIL, ZESTRIL) 20 mg tablet TAKE 1 TABLET BY MOUTH ONCE DAILY    metoprolol succinate (TOPROL-XL) 25 mg XL tablet Take 1 tablet by mouth once daily    omeprazole (PRILOSEC) 20 mg capsule Take  by mouth daily.  Apple Cider Vinegar 300 mg tab Take  by mouth daily.  therapeutic multivitamin (THERAGRAN) tablet Take 1 Tab by mouth daily.  liraglutide (Victoza 2-Compa) 0.6 mg/0.1 mL (18 mg/3 mL) pnij 1.8 mg by SubCUTAneous route daily.     atorvastatin (LIPITOR) 20 mg tablet Take 1 tablet by mouth once daily    PARoxetine (PAXIL) 20 mg tablet Take  by mouth as needed. (Patient not taking: Reported on 8/23/2021)    QUEtiapine (SEROquel) 100 mg tablet Take  by mouth daily. (Patient not taking: Reported on 8/23/2021)    nitroglycerin (NITROSTAT) 0.4 mg SL tablet 1 Tab by SubLINGual route every five (5) minutes as needed for Chest Pain. (Patient not taking: Reported on 8/23/2021)    albuterol (PROVENTIL HFA, VENTOLIN HFA, PROAIR HFA) 90 mcg/actuation inhaler Take 1-2 puffs every 4-6 hrs prn shortness of breath (Patient not taking: Reported on 8/23/2021)    fluticasone-salmeterol (ADVAIR DISKUS) 500-50 mcg/dose diskus inhaler Take 1 Puff by inhalation every twelve (12) hours. (Patient not taking: Reported on 8/23/2021)     No current facility-administered medications for this visit. Allergies   Allergen Reactions    Iodine Anaphylaxis    Norco [Hydrocodone-Acetaminophen] Nausea and Vomiting     Gi distress    Shellfish Derived Swelling and Angioedema     Other reaction(s): anaphylaxis/angioedema  SHRIMP [employer] CRAB    Acetaminophen Other (comments)    Metformin Diarrhea          Review of Systems:  Pertinent items are noted in the History of Present Illness. Objective:     Visit Vitals  /89 (BP 1 Location: Right arm, BP Patient Position: Sitting)   Pulse 75   Temp 97.5 °F (36.4 °C)   Ht 5' 9\" (1.753 m)   Wt 98.9 kg (218 lb)   SpO2 94%   BMI 32.19 kg/m²       Physical Exam:      General:  in no apparent distress and alert   Eyes:  conjunctivae and sclerae normal, pupils equal, round, reactive to light   Neck: The beginning I could not see the mass until the patient had to examine himself and pointed for me. It is about 3 to 5 mm in size. Located in the posterior neck on the left side. It is soft and movable.                             Imaging and Lab Review:     CBC:   Lab Results   Component Value Date/Time    WBC 5.4 10/20/2020 08:59 AM    RBC 4.98 10/20/2020 08:59 AM    HGB 13.7 10/20/2020 08:59 AM    HCT 43.0 10/20/2020 08:59 AM    PLATELET 094 10/20/2020 08:59 AM     BMP:   Lab Results   Component Value Date/Time    Glucose 171 (H) 08/10/2021 12:00 AM    Sodium 141 08/10/2021 12:00 AM    Potassium 4.2 08/10/2021 12:00 AM    Chloride 103 08/10/2021 12:00 AM    CO2 28 08/10/2021 12:00 AM    BUN 9 08/10/2021 12:00 AM    Creatinine 0.96 08/10/2021 12:00 AM    Calcium 9.0 08/10/2021 12:00 AM     CMP:  Lab Results   Component Value Date/Time    Glucose 171 (H) 08/10/2021 12:00 AM    Sodium 141 08/10/2021 12:00 AM    Potassium 4.2 08/10/2021 12:00 AM    Chloride 103 08/10/2021 12:00 AM    CO2 28 08/10/2021 12:00 AM    BUN 9 08/10/2021 12:00 AM    Creatinine 0.96 08/10/2021 12:00 AM    Calcium 9.0 08/10/2021 12:00 AM    Anion gap 5 10/20/2020 08:59 AM    BUN/Creatinine ratio 9 08/10/2021 12:00 AM    Alk. phosphatase 86 08/10/2021 12:00 AM    Protein, total 5.9 (L) 08/10/2021 12:00 AM    Albumin 4.0 08/10/2021 12:00 AM    Globulin 2.9 10/20/2020 08:59 AM    A-G Ratio 2.1 08/10/2021 12:00 AM       No results found for this or any previous visit (from the past 24 hour(s)). images and reports reviewed    Assessment:   Linsey Cowan is a 45 y.o. male who is presenting with a left posterior neck mass that is extremely small and not concerning on examination and from the history. I started explaining to the patient that most likely this represent a benign soft tissue mass and I advised him not to have it removed because it is extremely small and it is benign on examination. For some reason when I told the patient about that most likely this mass is benign he got slightly upset and he explained to me that he does not trust me and he will not take my word for it. I apologized to the patient and explained to him that I did not say that for sure 100% this is a benign lesion but more than 99.9% chance this is a benign lesion based on the history and the physical examination.   I explained to him that my suggestion is to observe it because it is extremely small and again it is most likely benign. The patient was still not convinced and he stated that he will find someone else to do it. I explained to him that I am more than happy to find him someone to do it but I also explained to him that if he can give me some time to discuss his case with Ms. Jeremy Hill who sent him to me so we can have a better understanding of the situation. He agreed with that. I placed a call to the office of Ms. Jeremy Hill and I left a message with the team to call me when she gets a chance.      Plan:     I do not believe a surgical intervention is needed  I will discuss the case with his Cathryn Mast who is taking care of him to make sure I am not missing anything  I will help the patient find another surgeon if he would like it to be excised and if he still upset with me    Please call me if you have any questions (cell phone: [phone])     Signed By: Swapnil Orr MD     August 23, 2021

## 2021-08-23 NOTE — TELEPHONE ENCOUNTER
Received call from Dr. Gillian Cortez regarding small mass to left neck. Patient requesting new surgical referral because patient not happy that surgeon said that it is likely a benign mass. Discussed case and surgeon will refer to new surgeon.

## 2021-09-08 ENCOUNTER — OFFICE VISIT (OUTPATIENT)
Dept: FAMILY MEDICINE CLINIC | Age: 51
End: 2021-09-08
Payer: MEDICARE

## 2021-09-08 VITALS
TEMPERATURE: 98.4 F | HEART RATE: 66 BPM | SYSTOLIC BLOOD PRESSURE: 128 MMHG | HEIGHT: 69 IN | OXYGEN SATURATION: 97 % | WEIGHT: 219.8 LBS | BODY MASS INDEX: 32.56 KG/M2 | DIASTOLIC BLOOD PRESSURE: 84 MMHG

## 2021-09-08 DIAGNOSIS — Z23 ENCOUNTER FOR IMMUNIZATION: ICD-10-CM

## 2021-09-08 DIAGNOSIS — R22.1 LUMP IN NECK: ICD-10-CM

## 2021-09-08 DIAGNOSIS — F20.9 SCHIZOPHRENIA, UNSPECIFIED TYPE (HCC): ICD-10-CM

## 2021-09-08 DIAGNOSIS — E11.9 TYPE 2 DIABETES MELLITUS WITHOUT COMPLICATION, WITHOUT LONG-TERM CURRENT USE OF INSULIN (HCC): Primary | ICD-10-CM

## 2021-09-08 DIAGNOSIS — E78.5 HYPERLIPIDEMIA, UNSPECIFIED HYPERLIPIDEMIA TYPE: ICD-10-CM

## 2021-09-08 DIAGNOSIS — M54.50 LUMBAR BACK PAIN: ICD-10-CM

## 2021-09-08 DIAGNOSIS — I10 BENIGN HYPERTENSION WITHOUT CHF: ICD-10-CM

## 2021-09-08 PROCEDURE — G9717 DOC PT DX DEP/BP F/U NT REQ: HCPCS | Performed by: NURSE PRACTITIONER

## 2021-09-08 PROCEDURE — G8427 DOCREV CUR MEDS BY ELIG CLIN: HCPCS | Performed by: NURSE PRACTITIONER

## 2021-09-08 PROCEDURE — 2022F DILAT RTA XM EVC RTNOPTHY: CPT | Performed by: NURSE PRACTITIONER

## 2021-09-08 PROCEDURE — G8754 DIAS BP LESS 90: HCPCS | Performed by: NURSE PRACTITIONER

## 2021-09-08 PROCEDURE — 3017F COLORECTAL CA SCREEN DOC REV: CPT | Performed by: NURSE PRACTITIONER

## 2021-09-08 PROCEDURE — 99204 OFFICE O/P NEW MOD 45 MIN: CPT | Performed by: NURSE PRACTITIONER

## 2021-09-08 PROCEDURE — G8752 SYS BP LESS 140: HCPCS | Performed by: NURSE PRACTITIONER

## 2021-09-08 PROCEDURE — 3044F HG A1C LEVEL LT 7.0%: CPT | Performed by: NURSE PRACTITIONER

## 2021-09-08 PROCEDURE — G8417 CALC BMI ABV UP PARAM F/U: HCPCS | Performed by: NURSE PRACTITIONER

## 2021-09-08 PROCEDURE — 90686 IIV4 VACC NO PRSV 0.5 ML IM: CPT | Performed by: NURSE PRACTITIONER

## 2021-09-08 PROCEDURE — G0008 ADMIN INFLUENZA VIRUS VAC: HCPCS | Performed by: NURSE PRACTITIONER

## 2021-09-08 RX ORDER — METHOCARBAMOL 750 MG/1
1 TABLET, FILM COATED ORAL 4 TIMES DAILY
COMMUNITY
End: 2021-11-30 | Stop reason: SDUPTHER

## 2021-09-08 NOTE — PROGRESS NOTES
After obtaining consent, and per orders of NATI Rosas, injection of Influenza vaccine FLULAVAL given by Sobia Morley. Patient instructed to remain in clinic for 15 minutes afterwards, and to report any adverse reaction to me immediately.
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
## 2021-11-01 NOTE — PATIENT INSTRUCTIONS
1) use heating pad, icy/hot, tiger balm for pain    2) Don't take  Flexeril and cymbalta . while driving or operating heavy machinery. Don't take flexeril and cymbalta At the same time. Take 6-8 hours in between if you have to take the medications. 3) follow-up in 1 month or sooner if worsening symptoms. 4) continue the ibuprofen. Take ibuprofen With food. If you notice any blood/black tarry stools, diarrhea, abdominal pain, nausea, vomiting then stop medication immediately. Give us a call ASAP. Back Stretches: Exercises  Your Care Instructions  Here are some examples of exercises for stretching your back. Start each exercise slowly. Ease off the exercise if you start to have pain. Your doctor or physical therapist will tell you when you can start these exercises and which ones will work best for you. How to do the exercises  Overhead stretch    1. Stand comfortably with your feet shoulder-width apart. 2. Looking straight ahead, raise both arms over your head and reach toward the ceiling. Do not allow your head to tilt back. 3. Hold for 15 to 30 seconds, then lower your arms to your sides. 4. Repeat 2 to 4 times. Side stretch    1. Stand comfortably with your feet shoulder-width apart. 2. Raise one arm over your head, and then lean to the other side. 3. Slide your hand down your leg as you let the weight of your arm gently stretch your side muscles. Hold for 15 to 30 seconds. 4. Repeat 2 to 4 times on each side. Press-up    1. Lie on your stomach, supporting your body with your forearms. 2. Press your elbows down into the floor to raise your upper back. As you do this, relax your stomach muscles and allow your back to arch without using your back muscles. As your press up, do not let your hips or pelvis come off the floor. 3. Hold for 15 to 30 seconds, then relax. 4. Repeat 2 to 4 times. Relax and rest    1.  Lie on your back with a rolled towel under your neck and a pillow under your Received request via: Patient    Was the patient seen in the last year in this department? Yes    Does the patient have an active prescription (recently filled or refills available) for medication(s) requested? No     Requested Prescriptions     Pending Prescriptions Disp Refills   • imipramine (TOFRANIL) 10 MG Tab 180 Tablet 0     Sig: Take 2 Tablets by mouth every evening.          knees. Extend your arms comfortably to your sides. 2. Relax and breathe normally. 3. Remain in this position for about 10 minutes. 4. If you can, do this 2 or 3 times each day. Follow-up care is a key part of your treatment and safety. Be sure to make and go to all appointments, and call your doctor if you are having problems. It's also a good idea to know your test results and keep a list of the medicines you take. Where can you learn more? Go to http://[email or web address] [email or web address]-fernando.info/. Enter H689 in the search box to learn more about \"Back Stretches: Exercises. \"  Current as of: March 21, 2017  Content Version: 11.3  © 8131-8519 [employer]. Care instructions adapted under license by [employer] (which disclaims liability or warranty for this information). If you have questions about a medical condition or this instruction, always ask your healthcare professional. [address] any warranty or liability for your use of this information. Low Back Pain: Exercises  Your Care Instructions  Here are some examples of typical rehabilitation exercises for your condition. Start each exercise slowly. Ease off the exercise if you start to have pain. Your doctor or physical therapist will tell you when you can start these exercises and which ones will work best for you. How to do the exercises  Press-up    5. Lie on your stomach, supporting your body with your forearms. 6. Press your elbows down into the floor to raise your upper back. As you do this, relax your stomach muscles and allow your back to arch without using your back muscles. As your press up, do not let your hips or pelvis come off the floor. 7. Hold for 15 to 30 seconds, then relax. 8. Repeat 2 to 4 times. Alternate arm and leg (bird dog) exercise    Note: Do this exercise slowly. Try to keep your body straight at all times, and do not let one hip drop lower than the other.   5. Start on the floor, on your hands and knees. 6. Tighten your belly muscles. 7. Raise one leg off the floor, and hold it straight out behind you. Be careful not to let your hip drop down, because that will twist your trunk. 8. Hold for about 6 seconds, then lower your leg and switch to the other leg. 9. Repeat 8 to 12 times on each leg. 10. Over time, work up to holding for 10 to 30 seconds each time. 11. If you feel stable and secure with your leg raised, try raising the opposite arm straight out in front of you at the same time. Knee-to-chest exercise    5. Lie on your back with your knees bent and your feet flat on the floor. 6. Bring one knee to your chest, keeping the other foot flat on the floor (or keeping the other leg straight, whichever feels better on your lower back). 7. Keep your lower back pressed to the floor. Hold for at least 15 to 30 seconds. 8. Relax, and lower the knee to the starting position. 9. Repeat with the other leg. Repeat 2 to 4 times with each leg. 10. To get more stretch, put your other leg flat on the floor while pulling your knee to your chest.  Curl-ups    5. Lie on the floor on your back with your knees bent at a 90-degree angle. Your feet should be flat on the floor, about 12 inches from your buttocks. 6. Cross your arms over your chest. If this bothers your neck, try putting your hands behind your neck (not your head), with your elbows spread apart. 7. Slowly tighten your belly muscles and raise your shoulder blades off the floor. 8. Keep your head in line with your body, and do not press your chin to your chest.  9. Hold this position for 1 or 2 seconds, then slowly lower yourself back down to the floor. 10. Repeat 8 to 12 times. Pelvic tilt exercise    1. Lie on your back with your knees bent. 2. \"Brace\" your stomach. This means to tighten your muscles by pulling in and imagining your belly button moving toward your spine.  You should feel like your back is pressing to the floor and your hips and pelvis are rocking back. 3. Hold for about 6 seconds while you breathe smoothly. 4. Repeat 8 to 12 times. Heel dig bridging    1. Lie on your back with both knees bent and your ankles bent so that only your heels are digging into the floor. Your knees should be bent about 90 degrees. 2. Then push your heels into the floor, squeeze your buttocks, and lift your hips off the floor until your shoulders, hips, and knees are all in a straight line. 3. Hold for about 6 seconds as you continue to breathe normally, and then slowly lower your hips back down to the floor and rest for up to 10 seconds. 4. Do 8 to 12 repetitions. Hamstring stretch in doorway    1. Lie on your back in a doorway, with one leg through the open door. 2. Slide your leg up the wall to straighten your knee. You should feel a gentle stretch down the back of your leg. 3. Hold the stretch for at least 15 to 30 seconds. Do not arch your back, point your toes, or bend either knee. Keep one heel touching the floor and the other heel touching the wall. 4. Repeat with your other leg. 5. Do 2 to 4 times for each leg. Hip flexor stretch    1. Kneel on the floor with one knee bent and one leg behind you. Place your forward knee over your foot. Keep your other knee touching the floor. 2. Slowly push your hips forward until you feel a stretch in the upper thigh of your rear leg. 3. Hold the stretch for at least 15 to 30 seconds. Repeat with your other leg. 4. Do 2 to 4 times on each side. Wall sit    1. Stand with your back 10 to 12 inches away from a wall. 2. Lean into the wall until your back is flat against it. 3. Slowly slide down until your knees are slightly bent, pressing your lower back into the wall. 4. Hold for about 6 seconds, then slide back up the wall. 5. Repeat 8 to 12 times. Follow-up care is a key part of your treatment and safety.  Be sure to make and go to all appointments, and call your doctor if you are having problems. It's also a good idea to know your test results and keep a list of the medicines you take. Where can you learn more? Go to http://[email or web address] [email or web address]-[email or web address].[email or web address] Enter B210 in the search box to learn more about \"Low Back Pain: Exercises. \"  Current as of: March 21, 2017  Content Version: 11.3  © 1658-4775 [employer]. Care instructions adapted under license by [employer] (which disclaims liability or warranty for this information). If you have questions about a medical condition or this instruction, always ask your healthcare professional. [place] [address] any warranty or liability for your use of this information. Acute Low Back Pain: Exercises  Your Care Instructions  Here are some examples of typical rehabilitation exercises for your condition. Start each exercise slowly. Ease off the exercise if you start to have pain. Your doctor or physical therapist will tell you when you can start these exercises and which ones will work best for you. When you are not being active, find a comfortable position for rest. Some people are comfortable on the floor or a medium-firm bed with a small pillow under their head and another under their knees. Some people prefer to lie on their side with a pillow between their knees. Don't stay in one position for too long. Take short walks (10 to 20 minutes) every 2 to 3 hours. Avoid slopes, hills, and stairs until you feel better. Walk only distances you can manage without pain, especially leg pain. How to do the exercises  Back stretches    9. Get down on your hands and knees on the floor. 10. Relax your head and allow it to droop. Round your back up toward the ceiling until you feel a nice stretch in your upper, middle, and lower back. Hold this stretch for as long as it feels comfortable, or about 15 to 30 seconds.   11. Return to the starting position with a flat back while you are on your hands and knees.  12. Let your back sway by pressing your stomach toward the floor. Lift your buttocks toward the ceiling. 13. Hold this position for 15 to 30 seconds. 14. Repeat 2 to 4 times. Follow-up care is a key part of your treatment and safety. Be sure to make and go to all appointments, and call your doctor if you are having problems. It's also a good idea to know your test results and keep a list of the medicines you take. Where can you learn more? Go to http://[email or web address] [email or web address]-[email or web address].[email or web address] Enter P159 in the search box to learn more about \"Acute Low Back Pain: Exercises. \"  Current as of: March 21, 2017  Content Version: 11.3  © 8936-7435 [employer]. Care instructions adapted under license by [employer] (which disclaims liability or warranty for this information). If you have questions about a medical condition or this instruction, always ask your healthcare professional. [address] any warranty or liability for your use of this information.

## 2021-11-30 ENCOUNTER — OFFICE VISIT (OUTPATIENT)
Dept: FAMILY MEDICINE CLINIC | Age: 51
End: 2021-11-30
Payer: MEDICARE

## 2021-11-30 VITALS
RESPIRATION RATE: 17 BRPM | BODY MASS INDEX: 32.76 KG/M2 | HEIGHT: 69 IN | TEMPERATURE: 97.5 F | OXYGEN SATURATION: 98 % | WEIGHT: 221.2 LBS | HEART RATE: 58 BPM | DIASTOLIC BLOOD PRESSURE: 90 MMHG | SYSTOLIC BLOOD PRESSURE: 146 MMHG

## 2021-11-30 DIAGNOSIS — I10 BENIGN HYPERTENSION WITHOUT CHF: ICD-10-CM

## 2021-11-30 DIAGNOSIS — E11.9 TYPE 2 DIABETES MELLITUS WITHOUT COMPLICATION, WITHOUT LONG-TERM CURRENT USE OF INSULIN (HCC): ICD-10-CM

## 2021-11-30 DIAGNOSIS — M54.50 LUMBAR BACK PAIN: Primary | ICD-10-CM

## 2021-11-30 PROCEDURE — G8755 DIAS BP > OR = 90: HCPCS | Performed by: NURSE PRACTITIONER

## 2021-11-30 PROCEDURE — G8427 DOCREV CUR MEDS BY ELIG CLIN: HCPCS | Performed by: NURSE PRACTITIONER

## 2021-11-30 PROCEDURE — 2022F DILAT RTA XM EVC RTNOPTHY: CPT | Performed by: NURSE PRACTITIONER

## 2021-11-30 PROCEDURE — 3044F HG A1C LEVEL LT 7.0%: CPT | Performed by: NURSE PRACTITIONER

## 2021-11-30 PROCEDURE — 3017F COLORECTAL CA SCREEN DOC REV: CPT | Performed by: NURSE PRACTITIONER

## 2021-11-30 PROCEDURE — 99214 OFFICE O/P EST MOD 30 MIN: CPT | Performed by: NURSE PRACTITIONER

## 2021-11-30 PROCEDURE — G9717 DOC PT DX DEP/BP F/U NT REQ: HCPCS | Performed by: NURSE PRACTITIONER

## 2021-11-30 PROCEDURE — G8753 SYS BP > OR = 140: HCPCS | Performed by: NURSE PRACTITIONER

## 2021-11-30 PROCEDURE — G8417 CALC BMI ABV UP PARAM F/U: HCPCS | Performed by: NURSE PRACTITIONER

## 2021-11-30 RX ORDER — METHOCARBAMOL 750 MG/1
750 TABLET, FILM COATED ORAL 4 TIMES DAILY
Qty: 30 TABLET | Refills: 0 | Status: SHIPPED | OUTPATIENT
Start: 2021-11-30 | End: 2022-03-15 | Stop reason: SDUPTHER

## 2021-11-30 RX ORDER — DICLOFENAC SODIUM 10 MG/G
GEL TOPICAL 4 TIMES DAILY
Qty: 1 EACH | Refills: 2 | Status: SHIPPED | OUTPATIENT
Start: 2021-11-30 | End: 2022-03-15 | Stop reason: SDUPTHER

## 2021-11-30 RX ORDER — LANCETS
EACH MISCELLANEOUS
Qty: 1 EACH | Refills: 3 | Status: SHIPPED | OUTPATIENT
Start: 2021-11-30

## 2021-11-30 RX ORDER — LIDOCAINE 50 MG/G
PATCH TOPICAL
Qty: 30 EACH | Refills: 2 | Status: SHIPPED | OUTPATIENT
Start: 2021-11-30 | End: 2022-03-15 | Stop reason: SDUPTHER

## 2021-11-30 NOTE — PROGRESS NOTES
The Jacey Lorenzo 45 y.o. male presents today for:    Chief Complaint   Patient presents with    Follow-up    Diabetes    Back Pain     Patient completed Karen Loup for 30 days with Bahai. Pt states blood glucoses have improved and averaging about 90 for fasting blood glucose and 115's after meals. Pt is excited to see what his A1c is; however, patient has [place] and cannot receive POC A1c. Pt finished PT and states still with intermittent lower back pain. Pt states no further issues with mass on side of neck. Pt states he does not want another surgical referral at this time. Review of Systems   Constitutional: Negative. Negative for chills, fever, malaise/fatigue and weight loss. HENT: Negative. Eyes: Negative. Respiratory: Negative. Negative for cough, hemoptysis, shortness of breath and wheezing. Cardiovascular: Negative. Negative for chest pain, palpitations and leg swelling. Gastrointestinal: Negative. Genitourinary: Negative. Negative for dysuria and urgency. Musculoskeletal: Positive for back pain and neck pain. Skin: Negative. Neurological: Negative. Negative for dizziness and headaches. Endo/Heme/Allergies: Negative. Psychiatric/Behavioral: Negative. Health Maintenance Due   Topic Date Due    COVID-19 Vaccine (3 - Booster for Pfizer series) 11/19/2021        Past Medical History:   Diagnosis Date    Abnormal EKG 7/9/2018    Chronic ST-T changes noted likely from LVH. Prior cardiac workup includes stress test and cardiac catheterization.   Reviewed    Advance directive discussed with patient 05/03/2017    Arthritis     left knee    Asthma     CAD (coronary artery disease)     heart attack 6/2012, Being followed by Dr. Christopher Pagan Cocaine use 2017    Depression     Diabetes ([facility] [address]     DVT (deep venous thrombosis) (HCC)     left leg in the past (Pt was on coumadin)    GERD (gastroesophageal reflux disease)     Hyperlipidemia 7/9/2018    cramps with pravastatin. Consider alternate therapy after surgery    Hypertension     Hypertensive heart disease without heart failure 7/9/2018    Blood pressure control. LVH on EKG.  Hypertensive left ventricular hypertrophy, without heart failure 7/9/2018    Mild LVH on last echo.  Medication refill 7/9/2018    Pre-operative cardiovascular examination 7/9/2018    Knee surgery. Stable cardiac status. Okay to proceed with moderate cardiac risk    Psychiatric disorder     depression and schziophenia    Schizophrenia ([facility] [address]     Will be seeing Otonomy    Type 2 diabetes mellitus without complication, without long-term current use of insulin ([facility] [address] 7/9/2018    On treatment    Unspecified sleep apnea     cpap machine,Will be seeing neurologist       Physical Exam  Constitutional:       Appearance: He is obese. HENT:      Head: Normocephalic. Right Ear: Tympanic membrane normal.      Left Ear: Tympanic membrane normal.      Nose: Nose normal.      Mouth/Throat:      Mouth: Mucous membranes are moist.   Eyes:      Extraocular Movements: Extraocular movements intact. Pupils: Pupils are equal, round, and reactive to light. Neck:      Vascular: No carotid bruit. Cardiovascular:      Rate and Rhythm: Normal rate and regular rhythm. Pulses: Normal pulses. Heart sounds: Normal heart sounds. Pulmonary:      Effort: Pulmonary effort is normal. No respiratory distress. Breath sounds: Normal breath sounds. No wheezing. Abdominal:      Palpations: Abdomen is soft. Musculoskeletal:         General: Signs of injury present. No tenderness. Cervical back: Normal range of motion and neck supple. No rigidity or tenderness. Lymphadenopathy:      Cervical: No cervical adenopathy. Skin:     General: Skin is warm. Capillary Refill: Capillary refill takes less than 2 seconds. Neurological:      General: No focal deficit present. Mental Status: He is alert. Visit Vitals  BP (!) 146/90   Pulse (!) 58   Temp 97.5 °F (36.4 °C) (Temporal)   Resp 17   Ht 5' 9\" (1.753 m)   Wt 221 lb 3.2 oz (100.3 kg)   SpO2 98%   BMI 32.67 kg/m²       Current Outpatient Medications:     methocarbamoL (ROBAXIN) 750 mg tablet, Take 1 Tablet by mouth four (4) times daily. , Disp: 30 Tablet, Rfl: 0    lidocaine (LIDODERM) 5 %, Apply patch to the affected area for 12 hours a day and remove for 12 hours a day., Disp: 30 Each, Rfl: 2    diclofenac (VOLTAREN) 1 % gel, Apply  to affected area four (4) times daily. , Disp: 1 Each, Rfl: 2    lancets misc, Use to to check blood sugar once a day. Please dispense accu-chek fastclix lancet drum, Disp: 1 Each, Rfl: 3    fenofibrate nanocrystallized (TRICOR) 48 mg tablet, Take 1 Tablet by mouth daily. , Disp: 90 Tablet, Rfl: 2    lisinopriL (PRINIVIL, ZESTRIL) 20 mg tablet, TAKE 1 TABLET BY MOUTH ONCE DAILY, Disp: 90 Tablet, Rfl: 1    metoprolol succinate (TOPROL-XL) 25 mg XL tablet, Take 1 tablet by mouth once daily, Disp: 90 Tablet, Rfl: 0    omeprazole (PRILOSEC) 20 mg capsule, Take  by mouth daily. , Disp: , Rfl:     Apple Cider Vinegar 300 mg tab, Take  by mouth daily. , Disp: , Rfl:     therapeutic multivitamin (THERAGRAN) tablet, Take 1 Tab by mouth daily. , Disp: , Rfl:     liraglutide (Victoza 2-Compa) 0.6 mg/0.1 mL (18 mg/3 mL) pnij, 1.8 mg by SubCUTAneous route daily. , Disp: 27 mL, Rfl: 1    atorvastatin (LIPITOR) 20 mg tablet, Take 1 tablet by mouth once daily, Disp: 90 Tab, Rfl: 1    nitroglycerin (NITROSTAT) 0.4 mg SL tablet, 1 Tab by SubLINGual route every five (5) minutes as needed for Chest Pain., Disp: 25 Tab, Rfl: 1    albuterol (PROVENTIL HFA, VENTOLIN HFA, PROAIR HFA) 90 mcg/actuation inhaler, Take 1-2 puffs every 4-6 hrs prn shortness of breath, Disp: 3 Inhaler, Rfl: 3    fluticasone-salmeterol (ADVAIR DISKUS) 500-50 mcg/dose diskus inhaler, Take 1 Puff by inhalation every twelve (12) hours.  As needed, Disp: , Rfl:    PARoxetine (PAXIL) 20 mg tablet, Take  by mouth as needed. (Patient not taking: Reported on 8/23/2021), Disp: , Rfl:     QUEtiapine (SEROquel) 100 mg tablet, Take  by mouth daily. (Patient not taking: Reported on 8/23/2021), Disp: , Rfl:      ASSESSMENT and PLAN     ICD-10-CM ICD-9-CM    1. Lumbar back pain  M54.50 724.2 methocarbamoL (ROBAXIN) 750 mg tablet   2. Benign hypertension without CHF  I10 401.1 MICROALBUMIN, UR, RAND W/ MICROALB/CREAT RATIO      MICROALBUMIN, UR, RAND W/ MICROALB/CREAT RATIO   3. Type 2 diabetes mellitus without complication, without long-term current use of insulin ([facility])  E11.9 250.00 HEMOGLOBIN A1C WITH EAG      HEMOGLOBIN A1C WITH EAG      lancets misc     Follow-up and Dispositions    · Return in about 3 months (around 2/28/2022).        lab results and schedule of future lab studies reviewed with patient  reviewed diet, exercise and weight control  very strongly urged to quit smoking to reduce cardiovascular risk  cardiovascular risk and specific lipid/LDL goals reviewed  reviewed medications and side effects in detail    Rhonda Miguel NP

## 2021-11-30 NOTE — PROGRESS NOTES
Patient did not take medication today  Jacquelyn Serrano presents today for   Chief Complaint   Patient presents with    Follow-up    Diabetes    Back Pain       Is someone accompanying this pt? no    Is the patient using any DME equipment during OV? no    Depression Screening:  3 most recent PHQ Screens 11/30/2021   Little interest or pleasure in doing things Not at all   Feeling down, depressed, irritable, or hopeless Not at all   Total Score PHQ 2 0       Learning Assessment:  Learning Assessment 7/7/2016   PRIMARY LEARNER Patient   HIGHEST LEVEL OF EDUCATION - PRIMARY LEARNER  -   PRIMARY LANGUAGE ENGLISH   LEARNER PREFERENCE PRIMARY LISTENING   ANSWERED BY self   RELATIONSHIP SELF       Health Maintenance reviewed and discussed and ordered per Provider. Health Maintenance Due   Topic Date Due    MICROALBUMIN Q1  10/20/2021   . Coordination of Care:  1. Have you been to the ER, urgent care clinic since your last visit? Hospitalized since your last visit? no    2. Have you seen or consulted any other health care providers outside of the [address] [place] [address] since your last visit? Include any pap smears or colon screening.  Yes 9/10/21 back pain [address] Maintenance Due   Topic Date Due    MICROALBUMIN Q1  10/20/2021

## 2021-12-01 LAB
ALBUMIN/CREAT UR: <2 MG/G CREAT (ref 0–29)
CREAT UR-MCNC: 136.6 MG/DL
EST. AVERAGE GLUCOSE BLD GHB EST-MCNC: 140 MG/DL
HBA1C MFR BLD: 6.5 % (ref 4.8–5.6)
MICROALBUMIN UR-MCNC: <3 UG/ML

## 2021-12-03 ENCOUNTER — TELEPHONE (OUTPATIENT)
Dept: FAMILY MEDICINE CLINIC | Age: 51
End: 2021-12-03

## 2022-03-15 ENCOUNTER — VIRTUAL VISIT (OUTPATIENT)
Dept: FAMILY MEDICINE CLINIC | Age: 52
End: 2022-03-15
Payer: MEDICARE

## 2022-03-15 DIAGNOSIS — J45.40 MODERATE PERSISTENT ASTHMA, UNSPECIFIED WHETHER COMPLICATED: ICD-10-CM

## 2022-03-15 DIAGNOSIS — E78.5 HYPERLIPIDEMIA, UNSPECIFIED HYPERLIPIDEMIA TYPE: ICD-10-CM

## 2022-03-15 DIAGNOSIS — I25.119 CORONARY ARTERY DISEASE INVOLVING NATIVE CORONARY ARTERY OF NATIVE HEART WITH ANGINA PECTORIS (HCC): ICD-10-CM

## 2022-03-15 DIAGNOSIS — E11.9 TYPE 2 DIABETES MELLITUS WITHOUT COMPLICATION, WITHOUT LONG-TERM CURRENT USE OF INSULIN (HCC): ICD-10-CM

## 2022-03-15 DIAGNOSIS — I10 BENIGN HYPERTENSION WITHOUT CHF: ICD-10-CM

## 2022-03-15 DIAGNOSIS — Z76.0 MEDICATION REFILL: ICD-10-CM

## 2022-03-15 DIAGNOSIS — K21.9 GASTROESOPHAGEAL REFLUX DISEASE WITHOUT ESOPHAGITIS: Primary | ICD-10-CM

## 2022-03-15 DIAGNOSIS — M54.50 LUMBAR BACK PAIN: ICD-10-CM

## 2022-03-15 PROCEDURE — 3046F HEMOGLOBIN A1C LEVEL >9.0%: CPT | Performed by: NURSE PRACTITIONER

## 2022-03-15 PROCEDURE — 99213 OFFICE O/P EST LOW 20 MIN: CPT | Performed by: NURSE PRACTITIONER

## 2022-03-15 PROCEDURE — G8427 DOCREV CUR MEDS BY ELIG CLIN: HCPCS | Performed by: NURSE PRACTITIONER

## 2022-03-15 PROCEDURE — G8756 NO BP MEASURE DOC: HCPCS | Performed by: NURSE PRACTITIONER

## 2022-03-15 PROCEDURE — G8417 CALC BMI ABV UP PARAM F/U: HCPCS | Performed by: NURSE PRACTITIONER

## 2022-03-15 PROCEDURE — 2022F DILAT RTA XM EVC RTNOPTHY: CPT | Performed by: NURSE PRACTITIONER

## 2022-03-15 PROCEDURE — 3017F COLORECTAL CA SCREEN DOC REV: CPT | Performed by: NURSE PRACTITIONER

## 2022-03-15 PROCEDURE — G9717 DOC PT DX DEP/BP F/U NT REQ: HCPCS | Performed by: NURSE PRACTITIONER

## 2022-03-15 RX ORDER — FLUTICASONE PROPIONATE AND SALMETEROL 500; 50 UG/1; UG/1
1 POWDER RESPIRATORY (INHALATION) EVERY 12 HOURS
Qty: 60 EACH | Refills: 2 | Status: SHIPPED | OUTPATIENT
Start: 2022-03-15 | End: 2022-10-06 | Stop reason: ALTCHOICE

## 2022-03-15 RX ORDER — ALBUTEROL SULFATE 90 UG/1
AEROSOL, METERED RESPIRATORY (INHALATION)
Qty: 1 EACH | Refills: 3 | Status: SHIPPED | OUTPATIENT
Start: 2022-03-15

## 2022-03-15 RX ORDER — LIDOCAINE 50 MG/G
PATCH TOPICAL
Qty: 30 EACH | Refills: 2 | Status: SHIPPED | OUTPATIENT
Start: 2022-03-15

## 2022-03-15 RX ORDER — NITROGLYCERIN 0.4 MG/1
0.4 TABLET SUBLINGUAL
Qty: 25 TABLET | Refills: 1 | Status: SHIPPED | OUTPATIENT
Start: 2022-03-15

## 2022-03-15 RX ORDER — DICLOFENAC SODIUM 10 MG/G
GEL TOPICAL 4 TIMES DAILY
Qty: 1 EACH | Refills: 2 | Status: SHIPPED | OUTPATIENT
Start: 2022-03-15

## 2022-03-15 RX ORDER — ATORVASTATIN CALCIUM 20 MG/1
TABLET, FILM COATED ORAL
Qty: 90 TABLET | Refills: 1 | Status: SHIPPED | OUTPATIENT
Start: 2022-03-15 | End: 2022-09-12 | Stop reason: SDUPTHER

## 2022-03-15 RX ORDER — LIRAGLUTIDE 6 MG/ML
1.8 INJECTION SUBCUTANEOUS DAILY
Qty: 27 ML | Refills: 1 | Status: SHIPPED | OUTPATIENT
Start: 2022-03-15 | End: 2022-09-12 | Stop reason: SDUPTHER

## 2022-03-15 RX ORDER — METHOCARBAMOL 750 MG/1
750 TABLET, FILM COATED ORAL 4 TIMES DAILY
Qty: 30 TABLET | Refills: 0 | Status: SHIPPED | OUTPATIENT
Start: 2022-03-15

## 2022-03-15 RX ORDER — LISINOPRIL 20 MG/1
TABLET ORAL
Qty: 90 TABLET | Refills: 1 | Status: SHIPPED | OUTPATIENT
Start: 2022-03-15 | End: 2022-09-12 | Stop reason: SDUPTHER

## 2022-03-15 RX ORDER — OMEPRAZOLE 20 MG/1
20 CAPSULE, DELAYED RELEASE ORAL DAILY
Qty: 30 CAPSULE | Refills: 1 | Status: SHIPPED | OUTPATIENT
Start: 2022-03-15

## 2022-03-15 RX ORDER — METOPROLOL SUCCINATE 25 MG/1
TABLET, EXTENDED RELEASE ORAL
Qty: 90 TABLET | Refills: 0 | Status: SHIPPED | OUTPATIENT
Start: 2022-03-15 | End: 2022-07-18 | Stop reason: SDUPTHER

## 2022-03-15 NOTE — PROGRESS NOTES
Patient do not have covid booster vaccine   Jorge Dye presents today for   Chief Complaint   Patient presents with    Follow-up     3 month     Hypertension    Medication Refill       Virtual/telephone visit    Depression Screening:  3 most recent PHQ Screens 11/30/2021   Little interest or pleasure in doing things Not at all   Feeling down, depressed, irritable, or hopeless Not at all   Total Score PHQ 2 0       Learning Assessment:  Learning Assessment 7/7/2016   PRIMARY LEARNER Patient   HIGHEST LEVEL OF EDUCATION - PRIMARY LEARNER  -   PRIMARY LANGUAGE ENGLISH   LEARNER PREFERENCE PRIMARY LISTENING   ANSWERED BY self   RELATIONSHIP SELF       Health Maintenance reviewed and discussed and ordered per Provider. Health Maintenance Due   Topic Date Due    COVID-19 Vaccine (3 - Booster for Huerta Harsha series) 10/19/2021   . Coordination of Care:  1. Have you been to the ER, urgent care clinic since your last visit? Hospitalized since your last visit? no    2. Have you seen or consulted any other health care providers outside of the [address] [place] [address] since your last visit? Include any pap smears or colon screening. No      3. For patients aged 39-70: Has the patient had a colonoscopy / FIT/ Cologuard? Yes - no Care Gap present      If the patient is female:    4. For patients aged 41-77: Has the patient had a mammogram within the past 2 years? NA - based on age or sex      11. For patients aged 21-65: Has the patient had a pap smear?  NA - based on age or sex

## 2022-03-15 NOTE — PROGRESS NOTES
Rodrigo Gutierrez (: 1970) is a 47 y.o. male, established patient, here for evaluation of the following chief complaint(s):   Follow-up (3 month ), Hypertension, and Medication Refill     Patient started working about 2 months ago as a . Urology-PSA went back up and scheduled with -    ASSESSMENT/PLAN:  Below is the assessment and plan developed based on review of pertinent history, labs, studies, and medications. 1. Gastroesophageal reflux disease without esophagitis  -     omeprazole (PRILOSEC) 20 mg capsule; Take 1 Capsule by mouth daily. , Normal, Disp-30 Capsule, R-1  2. Medication refill  -     metoprolol succinate (TOPROL-XL) 25 mg XL tablet; Take 1 tablet by mouth once daily, Normal, Disp-90 Tablet, R-0  -     lisinopriL (PRINIVIL, ZESTRIL) 20 mg tablet; TAKE 1 TABLET BY MOUTH ONCE DAILY, Normal, Disp-90 Tablet, R-1  -     albuterol (PROVENTIL HFA, VENTOLIN HFA, PROAIR HFA) 90 mcg/actuation inhaler; Take 1-2 puffs every 4-6 hrs prn shortness of breath, Normal, Disp-1 Each, R-3  3. Benign hypertension without CHF  -     metoprolol succinate (TOPROL-XL) 25 mg XL tablet; Take 1 tablet by mouth once daily, Normal, Disp-90 Tablet, R-0  -     lisinopriL (PRINIVIL, ZESTRIL) 20 mg tablet; TAKE 1 TABLET BY MOUTH ONCE DAILY, Normal, Disp-90 Tablet, R-1  4. Lumbar back pain  -     methocarbamoL (ROBAXIN) 750 mg tablet; Take 1 Tablet by mouth four (4) times daily. , Normal, Disp-30 Tablet, R-0  -     lidocaine (LIDODERM) 5 %; Apply patch to the affected area for 12 hours a day and remove for 12 hours a day., Normal, Disp-30 Each, R-2  -     diclofenac (VOLTAREN) 1 % gel; Apply  to affected area four (4) times daily. , Normal, Disp-1 Each, R-2  5. Hyperlipidemia, unspecified hyperlipidemia type  -     atorvastatin (LIPITOR) 20 mg tablet; Take 1 tablet by mouth once daily, Normal, Disp-90 Tablet, R-1  6.  Type 2 diabetes mellitus without complication, without long-term current use of insulin ([facility]  -     liraglutide (Victoza 2-Compa) 0.6 mg/0.1 mL (18 mg/3 mL) pnij; 1.8 mg by SubCUTAneous route daily. , Normal, Disp-27 mL, R-1  7. Moderate persistent asthma, unspecified whether complicated  -     fluticasone propion-salmeteroL (Advair Diskus) 500-50 mcg/dose diskus inhaler; Take 1 Puff by inhalation every twelve (12) hours. As needed, Normal, Disp-60 Each, R-2  8. Coronary artery disease involving native coronary artery of native heart with angina pectoris (HCC)  -     nitroglycerin (Nitrostat) 0.4 mg SL tablet; 1 Tablet by SubLINGual route every five (5) minutes as needed for Chest Pain., Normal, Disp-25 Tablet, R-1      Return in about 3 months (around 6/15/2022) for 1 week prior and then 3 month f/u .     SUBJECTIVE/OBJECTIVE:  HPI    Review of Systems     No data recorded     Physical Exam    [INSTRUCTIONS:  \"[x]\" Indicates a positive item  \"[]\" Indicates a negative item  -- DELETE ALL ITEMS NOT EXAMINED]    Constitutional: [x] Appears well-developed and well-nourished [x] No apparent distress      [] Abnormal -     Mental status: [x] Alert and awake  [x] Oriented to person/place/time [x] Able to follow commands    [] Abnormal -     Eyes:   EOM    [x]  Normal    [] Abnormal -   Sclera  [x]  Normal    [] Abnormal -          Discharge [x]  None visible   [] Abnormal -     HENT: [x] Normocephalic, atraumatic  [] Abnormal -   [x] Mouth/Throat: Mucous membranes are moist    External Ears [x] Normal  [] Abnormal -    Neck: [x] No visualized mass [] Abnormal -     Pulmonary/Chest: [x] Respiratory effort normal   [x] No visualized signs of difficulty breathing or respiratory distress        [] Abnormal -      Musculoskeletal:   [x] Normal gait with no signs of ataxia         [x] Normal range of motion of neck        [] Abnormal -     Neurological:        [x] No Facial Asymmetry (Cranial nerve 7 motor function) (limited exam due to video visit)          [x] No gaze palsy        [] Abnormal -          Skin: [x] No significant exanthematous lesions or discoloration noted on facial skin         [] Abnormal -            Psychiatric:       [x] Normal Affect [] Abnormal -        [x] No Hallucinations    Other pertinent observable physical exam findings:-        On this date 03/15/2022 I have spent 5 minutes reviewing previous notes, test results and face to face (virtual) with the patient discussing the diagnosis and importance of compliance with the treatment plan as well as documenting on the day of the visit. Candy Chakraborty, was evaluated through a synchronous (real-time) audio-video encounter. The patient (or guardian if applicable) is aware that this is a billable service, which includes applicable co-pays. Verbal consent to proceed has been obtained. The visit was conducted pursuant to the emergency declaration under the [address] [place] [address], [address] [place] [address] authority and the [employer] and [employer] General Act. Patient identification was verified, and a caregiver was present when appropriate. The patient was located at home in a state where the provider was licensed to provide care. An electronic signature was used to authenticate this note.   -- Kalen Barrios NP

## 2022-03-18 PROBLEM — H54.8 LEGAL BLINDNESS, AS DEFINED IN UNITED STATES OF AMERICA: Status: ACTIVE | Noted: 2021-08-06

## 2022-03-18 PROBLEM — F41.9 ANXIETY: Status: ACTIVE | Noted: 2021-08-06

## 2022-03-18 PROBLEM — Z71.89 ADVANCE DIRECTIVE DISCUSSED WITH PATIENT: Status: ACTIVE | Noted: 2017-05-03

## 2022-03-18 PROBLEM — E78.5 DYSLIPIDEMIA: Status: ACTIVE | Noted: 2021-08-06

## 2022-03-18 PROBLEM — Z01.810 PRE-OPERATIVE CARDIOVASCULAR EXAMINATION: Status: ACTIVE | Noted: 2018-07-09

## 2022-03-19 PROBLEM — I25.2 HISTORY OF MYOCARDIAL INFARCTION: Status: ACTIVE | Noted: 2021-08-06

## 2022-03-19 PROBLEM — R51.9 HEADACHE: Status: ACTIVE | Noted: 2021-08-06

## 2022-03-19 PROBLEM — R94.31 ABNORMAL EKG: Status: ACTIVE | Noted: 2018-07-09

## 2022-03-19 PROBLEM — J30.9 ATOPIC RHINITIS: Status: ACTIVE | Noted: 2021-08-06

## 2022-03-19 PROBLEM — R10.9 ABDOMINAL PAIN: Status: ACTIVE | Noted: 2021-08-06

## 2022-03-19 PROBLEM — Z86.0100 HISTORY OF COLONIC POLYPS: Status: ACTIVE | Noted: 2021-08-06

## 2022-03-19 PROBLEM — M17.11 PRIMARY OSTEOARTHRITIS OF RIGHT KNEE: Status: ACTIVE | Noted: 2018-07-12

## 2022-03-19 PROBLEM — G47.30 HYPERSOMNIA WITH SLEEP APNEA: Status: ACTIVE | Noted: 2021-08-06

## 2022-03-19 PROBLEM — M25.569 KNEE PAIN: Status: ACTIVE | Noted: 2021-08-06

## 2022-03-19 PROBLEM — J45.40 MODERATE PERSISTENT ASTHMA: Status: ACTIVE | Noted: 2021-08-06

## 2022-03-19 PROBLEM — E78.5 HYPERLIPIDEMIA: Status: ACTIVE | Noted: 2018-07-09

## 2022-03-19 PROBLEM — C80.1: Status: ACTIVE | Noted: 2020-10-06

## 2022-03-19 PROBLEM — G47.10 HYPERSOMNIA WITH SLEEP APNEA: Status: ACTIVE | Noted: 2021-08-06

## 2022-03-19 PROBLEM — M17.32 POST-TRAUMATIC OSTEOARTHRITIS OF LEFT KNEE: Status: ACTIVE | Noted: 2018-07-12

## 2022-03-19 PROBLEM — Z86.010 HISTORY OF COLONIC POLYPS: Status: ACTIVE | Noted: 2021-08-06

## 2022-03-19 PROBLEM — E66.01 MORBID OBESITY (HCC): Status: ACTIVE | Noted: 2021-08-06

## 2022-03-19 PROBLEM — I11.9 HYPERTENSIVE HEART DISEASE WITHOUT HEART FAILURE: Status: ACTIVE | Noted: 2018-07-09

## 2022-03-19 PROBLEM — J45.20 MILD INTERMITTENT ASTHMA: Status: ACTIVE | Noted: 2021-08-06

## 2022-03-20 PROBLEM — I11.9 HYPERTENSIVE LEFT VENTRICULAR HYPERTROPHY, WITHOUT HEART FAILURE: Status: ACTIVE | Noted: 2018-07-09

## 2022-03-20 PROBLEM — R79.89 ABNORMAL LIVER FUNCTION TESTS: Status: ACTIVE | Noted: 2021-08-06

## 2022-03-20 PROBLEM — R68.81 EARLY SATIETY: Status: ACTIVE | Noted: 2021-08-06

## 2022-03-20 PROBLEM — E11.9 TYPE 2 DIABETES MELLITUS WITHOUT COMPLICATION, WITHOUT LONG-TERM CURRENT USE OF INSULIN (HCC): Status: ACTIVE | Noted: 2018-07-09

## 2022-07-18 DIAGNOSIS — Z76.0 MEDICATION REFILL: ICD-10-CM

## 2022-07-18 DIAGNOSIS — I10 BENIGN HYPERTENSION WITHOUT CHF: ICD-10-CM

## 2022-07-18 RX ORDER — METOPROLOL SUCCINATE 25 MG/1
TABLET, EXTENDED RELEASE ORAL
Qty: 90 TABLET | Refills: 0 | Status: SHIPPED | OUTPATIENT
Start: 2022-07-18

## 2022-09-12 DIAGNOSIS — I10 BENIGN HYPERTENSION WITHOUT CHF: ICD-10-CM

## 2022-09-12 DIAGNOSIS — E11.9 TYPE 2 DIABETES MELLITUS WITHOUT COMPLICATION, WITHOUT LONG-TERM CURRENT USE OF INSULIN (HCC): ICD-10-CM

## 2022-09-12 DIAGNOSIS — Z76.0 MEDICATION REFILL: ICD-10-CM

## 2022-09-12 DIAGNOSIS — E78.5 HYPERLIPIDEMIA, UNSPECIFIED HYPERLIPIDEMIA TYPE: ICD-10-CM

## 2022-09-12 RX ORDER — LISINOPRIL 20 MG/1
TABLET ORAL
Qty: 90 TABLET | Refills: 1 | Status: SHIPPED | OUTPATIENT
Start: 2022-09-12 | End: 2022-10-06

## 2022-09-12 RX ORDER — ATORVASTATIN CALCIUM 20 MG/1
TABLET, FILM COATED ORAL
Qty: 90 TABLET | Refills: 1 | Status: SHIPPED | OUTPATIENT
Start: 2022-09-12

## 2022-09-12 RX ORDER — LIRAGLUTIDE 6 MG/ML
1.8 INJECTION SUBCUTANEOUS DAILY
Qty: 27 ML | Refills: 1 | Status: SHIPPED | OUTPATIENT
Start: 2022-09-12 | End: 2022-10-06 | Stop reason: ALTCHOICE

## 2022-10-06 ENCOUNTER — OFFICE VISIT (OUTPATIENT)
Dept: FAMILY MEDICINE CLINIC | Age: 52
End: 2022-10-06
Payer: MEDICARE

## 2022-10-06 VITALS
HEART RATE: 69 BPM | OXYGEN SATURATION: 97 % | SYSTOLIC BLOOD PRESSURE: 140 MMHG | DIASTOLIC BLOOD PRESSURE: 88 MMHG | BODY MASS INDEX: 33.41 KG/M2 | TEMPERATURE: 97.2 F | HEIGHT: 69 IN | RESPIRATION RATE: 17 BRPM | WEIGHT: 225.6 LBS

## 2022-10-06 DIAGNOSIS — I10 BENIGN HYPERTENSION WITHOUT CHF: ICD-10-CM

## 2022-10-06 DIAGNOSIS — F20.9 SCHIZOPHRENIA, UNSPECIFIED TYPE (HCC): ICD-10-CM

## 2022-10-06 DIAGNOSIS — R80.9 PROTEINURIA, UNSPECIFIED TYPE: ICD-10-CM

## 2022-10-06 DIAGNOSIS — C61 PROSTATE CANCER (HCC): Primary | ICD-10-CM

## 2022-10-06 DIAGNOSIS — I25.119 CORONARY ARTERY DISEASE INVOLVING NATIVE CORONARY ARTERY OF NATIVE HEART WITH ANGINA PECTORIS (HCC): ICD-10-CM

## 2022-10-06 DIAGNOSIS — Z00.00 MEDICARE ANNUAL WELLNESS VISIT, SUBSEQUENT: ICD-10-CM

## 2022-10-06 DIAGNOSIS — E11.3299 MILD NONPROLIFERATIVE DIABETIC RETINOPATHY ASSOCIATED WITH TYPE 2 DIABETES MELLITUS, MACULAR EDEMA PRESENCE UNSPECIFIED, UNSPECIFIED LATERALITY (HCC): ICD-10-CM

## 2022-10-06 DIAGNOSIS — Z86.718 HISTORY OF DVT (DEEP VEIN THROMBOSIS): ICD-10-CM

## 2022-10-06 DIAGNOSIS — Z76.0 MEDICATION REFILL: ICD-10-CM

## 2022-10-06 DIAGNOSIS — E78.5 HYPERLIPIDEMIA, UNSPECIFIED HYPERLIPIDEMIA TYPE: ICD-10-CM

## 2022-10-06 DIAGNOSIS — I11.9 HYPERTENSIVE HEART DISEASE WITHOUT HEART FAILURE: ICD-10-CM

## 2022-10-06 PROCEDURE — G8753 SYS BP > OR = 140: HCPCS | Performed by: NURSE PRACTITIONER

## 2022-10-06 PROCEDURE — 3017F COLORECTAL CA SCREEN DOC REV: CPT | Performed by: NURSE PRACTITIONER

## 2022-10-06 PROCEDURE — G0439 PPPS, SUBSEQ VISIT: HCPCS | Performed by: NURSE PRACTITIONER

## 2022-10-06 PROCEDURE — G8417 CALC BMI ABV UP PARAM F/U: HCPCS | Performed by: NURSE PRACTITIONER

## 2022-10-06 PROCEDURE — G9717 DOC PT DX DEP/BP F/U NT REQ: HCPCS | Performed by: NURSE PRACTITIONER

## 2022-10-06 PROCEDURE — 2022F DILAT RTA XM EVC RTNOPTHY: CPT | Performed by: NURSE PRACTITIONER

## 2022-10-06 PROCEDURE — G8427 DOCREV CUR MEDS BY ELIG CLIN: HCPCS | Performed by: NURSE PRACTITIONER

## 2022-10-06 PROCEDURE — 3046F HEMOGLOBIN A1C LEVEL >9.0%: CPT | Performed by: NURSE PRACTITIONER

## 2022-10-06 PROCEDURE — G8754 DIAS BP LESS 90: HCPCS | Performed by: NURSE PRACTITIONER

## 2022-10-06 RX ORDER — LISINOPRIL 40 MG/1
TABLET ORAL
Qty: 90 TABLET | Refills: 1 | Status: SHIPPED | OUTPATIENT
Start: 2022-10-06

## 2022-10-06 NOTE — PROGRESS NOTES
The [employer] 47 y.o. male presents today for:    Chief Complaint   Patient presents with    Follow-up     Urologist     Annual Wellness Visit   Patient just diagnosed with prostate CA  --pt is hopeful to get proton therapy   --pt is also scheduled for radiation   --had recent proteinuria at urologist    Pt states working since January and [facility]; doing well with schizophrenia       Cardiology-Dr. Shashi Arvizu but requesting new cardiologist   --will refer to Dr. Bhupendra Lujan    Patient states eating too much salt and sugar   --A1C 5.8%   --discontinued Victoza    Proteinuria  --increased lisinopril from 20 to 40 mg daily       Review of Systems   Constitutional:  Positive for malaise/fatigue. Negative for chills, fever and weight loss. HENT: Negative. Eyes: Negative. Respiratory: Negative. Negative for cough, hemoptysis, shortness of breath and wheezing. Cardiovascular: Negative. Negative for chest pain, palpitations and leg swelling. Gastrointestinal: Negative. Genitourinary: Negative. Negative for dysuria and urgency. Skin: Negative. Neurological: Negative. Negative for dizziness and headaches. Endo/Heme/Allergies: Negative. Psychiatric/Behavioral: Negative. Health Maintenance Due   Topic Date Due    Hepatitis B Vaccine (1 of 3 - Risk 3-dose series) Never done    Shingrix Vaccine Age 50> (1 of 2) Never done    Pneumococcal 0-64 years (2 - PCV) 01/08/2021    COVID-19 Vaccine (3 - Booster for Pfizer series) 10/19/2021    A1C test (Diabetic or Prediabetic)  05/30/2022    Flu Vaccine (1) 08/01/2022    Foot Exam Q1  08/06/2022        Past Medical History:   Diagnosis Date    Abnormal EKG 07/09/2018    Chronic ST-T changes noted likely from LVH. Prior cardiac workup includes stress test and cardiac catheterization.   Reviewed    Advance directive discussed with patient 05/03/2017    Arthritis     left knee    Asthma     CAD (coronary artery disease)     heart attack 6/2012, Being followed by Dr. Rebeca Mckeon [facility])     Cocaine use 2017    Depression     Diabetes ([facility]     DVT (deep venous thrombosis) (HCC)     left leg in the past (Pt was on coumadin)    GERD (gastroesophageal reflux disease)     Hyperlipidemia 07/09/2018    cramps with pravastatin. Consider alternate therapy after surgery    Hypertension     Hypertensive heart disease without heart failure 07/09/2018    Blood pressure control. LVH on EKG. Hypertensive left ventricular hypertrophy, without heart failure 07/09/2018    Mild LVH on last echo. Medication refill 07/09/2018    Pre-operative cardiovascular examination 07/09/2018    Knee surgery. Stable cardiac status. Okay to proceed with moderate cardiac risk    Psychiatric disorder     depression and schziophenia    Schizophrenia ([facility]     Will be seeing [employer]    Type 2 diabetes mellitus without complication, without long-term current use of insulin ([facility] 07/09/2018    On treatment    Unspecified sleep apnea     cpap machine,Will be seeing neurologist       Physical Exam  Constitutional:       General: He is not in acute distress. Appearance: He is obese. He is not toxic-appearing. Cardiovascular:      Rate and Rhythm: Normal rate and regular rhythm. Pulses: Normal pulses. Heart sounds: Normal heart sounds. No murmur heard. Pulmonary:      Effort: Pulmonary effort is normal. No respiratory distress. Breath sounds: Normal breath sounds. Abdominal:      General: Bowel sounds are normal.      Palpations: Abdomen is soft. Musculoskeletal:      Right lower leg: No edema. Left lower leg: No edema. Neurological:      General: No focal deficit present. Mental Status: He is alert and oriented to person, place, and time.     Visit Vitals  BP (!) 140/88   Pulse 69   Temp 97.2 °F (36.2 °C) (Temporal)   Resp 17   Ht 5' 9\" (1.753 m)   Wt 225 lb 9.6 oz (102.3 kg)   SpO2 97%   BMI 33.32 kg/m²       Current Outpatient Medications:     OTHER, daily. Beta prostate advanced, Disp: , Rfl:     lisinopriL (PRINIVIL, ZESTRIL) 40 mg tablet, TAKE 1 TABLET BY MOUTH ONCE DAILY, Disp: 90 Tablet, Rfl: 1    Accu-Chek Guide test strips strip, USE 1 STRIP TO CHECK GLUCOSE ONCE DAILY, Disp: , Rfl:     atorvastatin (LIPITOR) 20 mg tablet, Take 1 tablet by mouth once daily, Disp: 90 Tablet, Rfl: 1    metoprolol succinate (TOPROL-XL) 25 mg XL tablet, Take 1 tablet by mouth once daily, Disp: 90 Tablet, Rfl: 0    nitroglycerin (Nitrostat) 0.4 mg SL tablet, 1 Tablet by SubLINGual route every five (5) minutes as needed for Chest Pain., Disp: 25 Tablet, Rfl: 1    methocarbamoL (ROBAXIN) 750 mg tablet, Take 1 Tablet by mouth four (4) times daily. (Patient taking differently: Take 750 mg by mouth as needed.), Disp: 30 Tablet, Rfl: 0    lidocaine (LIDODERM) 5 %, Apply patch to the affected area for 12 hours a day and remove for 12 hours a day., Disp: 30 Each, Rfl: 2    diclofenac (VOLTAREN) 1 % gel, Apply  to affected area four (4) times daily. , Disp: 1 Each, Rfl: 2    omeprazole (PRILOSEC) 20 mg capsule, Take 1 Capsule by mouth daily. , Disp: 30 Capsule, Rfl: 1    albuterol (PROVENTIL HFA, VENTOLIN HFA, PROAIR HFA) 90 mcg/actuation inhaler, Take 1-2 puffs every 4-6 hrs prn shortness of breath, Disp: 1 Each, Rfl: 3    lancets misc, Use to to check blood sugar once a day. Please dispense accu-chek fastclix lancet drum, Disp: 1 Each, Rfl: 3    fenofibrate nanocrystallized (TRICOR) 48 mg tablet, Take 1 Tablet by mouth daily. , Disp: 90 Tablet, Rfl: 2    therapeutic multivitamin (THERAGRAN) tablet, Take 1 Tab by mouth daily. , Disp: , Rfl:         ASSESSMENT and PLAN    ICD-10-CM ICD-9-CM    1. Prostate cancer ([facility]  C61 185 CBC WITH AUTOMATED DIFF      CBC WITH AUTOMATED DIFF      2. Schizophrenia, unspecified type ([facility]  F20.9 295.90       3.  Mild nonproliferative diabetic retinopathy associated with type 2 diabetes mellitus, macular edema presence unspecified, unspecified laterality ([facility]  E11.3299 250.50      362.04       4. History of DVT (deep vein thrombosis)  Z86.718 V12.51       5. Proteinuria, unspecified type  R80.9 791.0 URINALYSIS W/ RFLX MICROSCOPIC      URINALYSIS W/ RFLX MICROSCOPIC      6. Benign hypertension without CHF  I10 401.1 REFERRAL TO CARDIOLOGY      lisinopriL (PRINIVIL, ZESTRIL) 40 mg tablet      7. Hyperlipidemia, unspecified hyperlipidemia type  E78.5 272.4 LIPID PANEL      LIPID PANEL      8. Hypertensive heart disease without heart failure  I11.9 402.90 REFERRAL TO CARDIOLOGY      METABOLIC PANEL, COMPREHENSIVE      METABOLIC PANEL, COMPREHENSIVE      9. Coronary artery disease involving native coronary artery of native heart with angina pectoris ([facility]  I25.119 414.01 REFERRAL TO CARDIOLOGY     413.9       10. Medication refill  Z76.0 V68.1 lisinopriL (PRINIVIL, ZESTRIL) 40 mg tablet      11. Medicare annual wellness visit, subsequent  Z00.00 V70.0         Follow-up and Dispositions    Return in about 2 weeks (around 10/20/2022) for nurse visit for BP check. lab results and schedule of future lab studies reviewed with patient  reviewed diet, exercise and weight control  cardiovascular risk and specific lipid/LDL goals reviewed    Vivian Kraus NP   This is the Subsequent Medicare Annual Wellness Exam, performed 12 months or more after the Initial AWV or the last Subsequent AWV    I have reviewed the patient's medical history in detail and updated the computerized patient record. Assessment/Plan   Education and counseling provided:  Are appropriate based on today's review and evaluation  Pneumococcal Vaccine  Influenza Vaccine  Prostate cancer screening tests (PSA, covered annually)  Colorectal cancer screening tests    1. Prostate cancer ([facility]  -     CBC WITH AUTOMATED DIFF; Future  2. Schizophrenia, unspecified type ([facility]  3.  Mild nonproliferative diabetic retinopathy associated with type 2 diabetes mellitus, macular edema presence unspecified, unspecified laterality ([facility]  4. History of DVT (deep vein thrombosis)  5. Proteinuria, unspecified type  -     URINALYSIS W/ RFLX MICROSCOPIC; Future  6. Benign hypertension without CHF  -     REFERRAL TO CARDIOLOGY  -     lisinopriL (PRINIVIL, ZESTRIL) 40 mg tablet; TAKE 1 TABLET BY MOUTH ONCE DAILY, Normal, Disp-90 Tablet, R-1  7. Hyperlipidemia, unspecified hyperlipidemia type  -     LIPID PANEL; Future  8. Hypertensive heart disease without heart failure  -     REFERRAL TO CARDIOLOGY  -     METABOLIC PANEL, COMPREHENSIVE; Future  9. Coronary artery disease involving native coronary artery of native heart with angina pectoris (HCC)  -     REFERRAL TO CARDIOLOGY  10. Medication refill  -     lisinopriL (PRINIVIL, ZESTRIL) 40 mg tablet; TAKE 1 TABLET BY MOUTH ONCE DAILY, Normal, Disp-90 Tablet, R-1  11. Medicare annual wellness visit, subsequent       Depression Risk Factor Screening     3 most recent PHQ Screens 10/6/2022   Little interest or pleasure in doing things Not at all   Feeling down, depressed, irritable, or hopeless Not at all   Total Score PHQ 2 0       Alcohol & Drug Abuse Risk Screen    Do you average more than 2 drinks per night or 14 drinks a week: No    On any one occasion in the past three months have you have had more than 4 drinks containing alcohol:  No          Functional Ability and Level of Safety    Hearing: Hearing is good. Activities of Daily Living: The home contains: no safety equipment. Patient does total self care      Ambulation: with no difficulty     Fall Risk:  Fall Risk Assessment, last 12 mths 8/4/2014   Able to walk? Yes   Fall in past 12 months?  No      Abuse Screen:  Patient is not abused       Cognitive Screening    Has your family/caregiver stated any concerns about your memory: no     Cognitive Screening: Normal - Clock Drawing Test    Health Maintenance Due     Health Maintenance Due   Topic Date Due    Hepatitis B Vaccine (1 of 3 - Risk 3-dose series) Never done    Shingrix Vaccine Age 50> (1 of 2) Never done    Pneumococcal 0-64 years (2 - PCV) 01/08/2021    COVID-19 Vaccine (3 - Booster for Pfizer series) 10/19/2021    A1C test (Diabetic or Prediabetic)  05/30/2022    Flu Vaccine (1) 08/01/2022    Foot Exam Q1  08/06/2022       Patient Care Team   Patient Care Team:  Rika Toscano NP as PCP - General (Nurse Practitioner)  Rika Toscano NP as PCP - [facility]  Ashwin Sandoavl MD (Orthopedic Surgery)  Kelton Curling, MD (Neurology)  Kelton Curling, DO (Sports Medicine Physician)  Claude Adler, RN as Ambulatory Care Manager  Nelida Riojas MD (Gastroenterology)  Eugene Singer MD (Internal Medicine Physician)  Nedra Peña MD (Radiation Oncology)    History     Patient Active Problem List   Diagnosis Code    DJD (degenerative joint disease) of knee M17.9    Encounter for long-term (current) use of other medications Z79.899    Left knee pain M25.562    Chronic pain syndrome G89.4    History of total knee arthroplasty Z96.659    S/P surgical manipulation of knee joint Z98.890    History of anxiety Z86.59    Schizophrenia ([address] F20.9    Unspecified sleep apnea G47.30    Psychiatric disorder F99    Hypertension I10    GERD (gastroesophageal reflux disease) K21.9    Diabetes ([address] E11.9    Depression F32. 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 ([facility] C80.1    Abnormal liver function tests R79.89    Anticoagulation monitoring, INR range 2-3 Z79.01    Anxiety F41.9    Atopic rhinitis J30.9    [facility] [id number]    Abdominal pain R10.9    Dyslipidemia E78.5    Early satiety R68.81    GI bleed K92.2    Headache R51.9    H/O eye injury Z87.828    History of colonic polyps Z86.010    History of myocardial infarction I25.2    Hypersomnia with sleep apnea G47.10, G47.30    Knee pain M25.569    Legal blindness, as defined in [place] H54.8    Lung nodule, solitary R91.1    Mild intermittent asthma J45.20    Mild nonproliferative diabetic retinopathy ([facility] E11.3299    Moderate persistent asthma J45.40    Morbid obesity ([facility] E66.01    Renal insufficiency, mild N28.9    Atherosclerosis of coronary artery without angina pectoris I25.10    Benign neoplasm of colon D12.6     Past Medical History:   Diagnosis Date    Abnormal EKG 07/09/2018    Chronic ST-T changes noted likely from LVH. Prior cardiac workup includes stress test and cardiac catheterization. Reviewed    Advance directive discussed with patient 05/03/2017    Arthritis     left knee    Asthma     CAD (coronary artery disease)     heart attack 6/2012, Being followed by Dr. Jose Martin Mon[facility])     Cocaine use 2017    Depression     Diabetes ([facility]     DVT (deep venous thrombosis) (HCC)     left leg in the past (Pt was on coumadin)    GERD (gastroesophageal reflux disease)     Hyperlipidemia 07/09/2018    cramps with pravastatin. Consider alternate therapy after surgery    Hypertension     Hypertensive heart disease without heart failure 07/09/2018    Blood pressure control. LVH on EKG. Hypertensive left ventricular hypertrophy, without heart failure 07/09/2018    Mild LVH on last echo. Medication refill 07/09/2018    Pre-operative cardiovascular examination 07/09/2018    Knee surgery. Stable cardiac status.   Okay to proceed with moderate cardiac risk    Psychiatric disorder     depression and schziophenia    Schizophrenia ([facility]     Will be seeing [employer]    Type 2 diabetes mellitus without complication, without long-term current use of insulin ([facility] 07/09/2018    On treatment    Unspecified sleep apnea     cpap machine,Will be seeing neurologist      Past Surgical History:   Procedure Laterality Date    HX COLONOSCOPY      HX HEART CATHETERIZATION  2014    cardiac cath - normal coronaries    HX HERNIA REPAIR      HX KNEE REPLACEMENT      left TKR    HX ORTHOPAEDIC      L knee 1987, 1996    HX OTHER SURGICAL      eye sx at 2 yrs old    HX UROLOGICAL  07/25/2022    TRANSRECTAL ULTRASOUND AND BIOPSY OF PROSTATE; Devon Aguilar    LA ABDOMEN SURGERY PROC UNLISTED       Current Outpatient Medications   Medication Sig Dispense Refill    OTHER daily. Beta prostate advanced      lisinopriL (PRINIVIL, ZESTRIL) 40 mg tablet TAKE 1 TABLET BY MOUTH ONCE DAILY 90 Tablet 1    Accu-Chek Guide test strips strip USE 1 STRIP TO CHECK GLUCOSE ONCE DAILY      atorvastatin (LIPITOR) 20 mg tablet Take 1 tablet by mouth once daily 90 Tablet 1    metoprolol succinate (TOPROL-XL) 25 mg XL tablet Take 1 tablet by mouth once daily 90 Tablet 0    nitroglycerin (Nitrostat) 0.4 mg SL tablet 1 Tablet by SubLINGual route every five (5) minutes as needed for Chest Pain. 25 Tablet 1    methocarbamoL (ROBAXIN) 750 mg tablet Take 1 Tablet by mouth four (4) times daily. (Patient taking differently: Take 750 mg by mouth as needed.) 30 Tablet 0    lidocaine (LIDODERM) 5 % Apply patch to the affected area for 12 hours a day and remove for 12 hours a day. 30 Each 2    diclofenac (VOLTAREN) 1 % gel Apply  to affected area four (4) times daily. 1 Each 2    omeprazole (PRILOSEC) 20 mg capsule Take 1 Capsule by mouth daily.  30 Capsule 1    albuterol (PROVENTIL HFA, VENTOLIN HFA, PROAIR HFA) 90 mcg/actuation inhaler Take 1-2 puffs every 4-6 hrs prn shortness of breath 1 Each 3    lancets misc Use to to check blood sugar once a day. Please dispense accu-chek fastclix lancet drum 1 Each 3    fenofibrate nanocrystallized (TRICOR) 48 mg tablet Take 1 Tablet by mouth daily. 90 Tablet 2    therapeutic multivitamin (THERAGRAN) tablet Take 1 Tab by mouth daily.        Allergies   Allergen Reactions    Iodine Anaphylaxis    Norco [Hydrocodone-Acetaminophen] Nausea and Vomiting     Gi distress    Shellfish Derived Swelling and Angioedema     Other reaction(s): anaphylaxis/angioedema  SHRIMP AND CRAB    Acetaminophen Other (comments)    Metformin Diarrhea       Family History   Problem Relation Age of Onset    Anemia Mother     Cancer Father         prostate    Cancer Brother     Diabetes Brother      Social History     Tobacco Use    Smoking status: Former     Packs/day: 0.25     Types: Cigars, Cigarettes    Smokeless tobacco: Never    Tobacco comments:     black and mild per day   Substance Use Topics    Alcohol use: Not Currently     Comment: \"a little\"/ occ         Alfonso Flavors, NP

## 2022-10-06 NOTE — PATIENT INSTRUCTIONS
Medicare Wellness Visit, Male    The best way to live healthy is to have a lifestyle where you eat a well-balanced diet, exercise regularly, limit alcohol use, and quit all forms of tobacco/nicotine, if applicable. Regular preventive services are another way to keep healthy. Preventive services (vaccines, screening tests, monitoring & exams) can help personalize your care plan, which helps you manage your own care. Screening tests can find health problems at the earliest stages, when they are easiest to treat. [facility] follows the current, evidence-based guidelines published by the [facility] ([facility]) when recommending preventive services for our patients. Because we follow these guidelines, sometimes recommendations change over time as research supports it. (For example, a prostate screening blood test is no longer routinely recommended for men with no symptoms). Of course, you and your doctor may decide to screen more often for some diseases, based on your risk and co-morbidities (chronic disease you are already diagnosed with). Preventive services for you include:  - Medicare offers their members a free annual wellness visit, which is time for you and your primary care provider to discuss and plan for your preventive service needs. Take advantage of this benefit every year!  -All adults over age 72 should receive the recommended pneumonia vaccines. Current USPSTF guidelines recommend a series of two vaccines for the best pneumonia protection.   -All adults should have a flu vaccine yearly and tetanus vaccine every 10 years.  -All adults age 48 and older should receive the shingles vaccines (series of two vaccines).        -All adults age 38-68 who are overweight should have a diabetes screening test once every three years.   -Other screening tests & preventive services for persons with diabetes include: an eye exam to screen for diabetic retinopathy, a kidney function test, a foot exam, and stricter control over your cholesterol.   -Cardiovascular screening for adults with routine risk involves an electrocardiogram (ECG) at intervals determined by the provider.   -Colorectal cancer screening should be done for adults age 54-65 with no increased risk factors for colorectal cancer. There are a number of acceptable methods of screening for this type of cancer. Each test has its own benefits and drawbacks. Discuss with your provider what is most appropriate for you during your annual wellness visit. The different tests include: colonoscopy (considered the best screening method), a fecal occult blood test, a fecal DNA test, and sigmoidoscopy.  -All adults born between [facility] should be screened once for Hepatitis C.  -An Abdominal Aortic Aneurysm (AAA) Screening is recommended for men age 73-68 who has ever smoked in their lifetime.      Here is a list of your current Health Maintenance items (your personalized list of preventive services) with a due date:  Health Maintenance Due   Topic Date Due    Shingles Vaccine (1 of 2) Never done    Pneumococcal Vaccine (2 - PCV) 01/08/2021    COVID-19 Vaccine (3 - Booster for Pfizer series) 10/19/2021    Hemoglobin A1C    05/30/2022    Yearly Flu Vaccine (1) 08/01/2022    Diabetic Foot Care  08/06/2022    Cholesterol Test   08/10/2022

## 2022-10-06 NOTE — PROGRESS NOTES
Patient declined Flu vaccine today. Patient do not have the 3rd covid vaccine. Patient states he took medication today  Jocy Rockwell presents today for   Chief Complaint   Patient presents with    Follow-up     Urologist        Is someone accompanying this pt? no    Is the patient using any DME equipment during [address]? no    Depression Screening:  3 most recent PHQ Screens 10/6/2022   Little interest or pleasure in doing things Not at all   Feeling down, depressed, irritable, or hopeless Not at all   Total Score PHQ 2 0       Learning Assessment:  Learning Assessment 7/7/2016   PRIMARY LEARNER Patient   HIGHEST LEVEL OF EDUCATION - PRIMARY LEARNER  -   PRIMARY LANGUAGE ENGLISH   LEARNER PREFERENCE PRIMARY LISTENING   ANSWERED BY self   RELATIONSHIP SELF       Health Maintenance reviewed and discussed and ordered per Provider. Health Maintenance Due   Topic Date Due    Shingrix Vaccine Age 49> (1 of 2) Never done    Pneumococcal 0-64 years (2 - PCV) 01/08/2021    COVID-19 Vaccine (3 - Booster for Pfizer series) 10/19/2021    A1C test (Diabetic or Prediabetic)  05/30/2022    Flu Vaccine (1) 08/01/2022    Foot Exam Q1  08/06/2022    Medicare Yearly Exam  08/07/2022    Lipid Screen  08/10/2022   . Coordination of Care:  1. Have you been to the ER, urgent care clinic since your last visit? Hospitalized since your last visit? no    2. Have you seen or consulted any other health care providers outside of the [address] [place] [address] since your last visit? Include any pap smears or colon screening. No       3. For patients aged 39-70: Has the patient had a colonoscopy / FIT/ Cologuard? Yes - no Care Gap present      If the patient is female:    4. For patients aged 41-77: Has the patient had a mammogram within the past 2 years? No      5. For patients aged 21-65: Has the patient had a pap smear?  NA - based on age or sex

## 2022-10-07 LAB
ALBUMIN SERPL-MCNC: 4.6 G/DL (ref 3.8–4.9)
ALBUMIN/GLOB SERPL: 1.6 {RATIO} (ref 1.2–2.2)
ALP SERPL-CCNC: 82 IU/L (ref 44–121)
ALT SERPL-CCNC: 36 IU/L (ref 0–44)
APPEARANCE UR: CLEAR
AST SERPL-CCNC: 35 IU/L (ref 0–40)
BASOPHILS # BLD AUTO: 0 X10E3/UL (ref 0–0.2)
BASOPHILS NFR BLD AUTO: 0 %
BILIRUB SERPL-MCNC: 0.3 MG/DL (ref 0–1.2)
BILIRUB UR QL STRIP: NEGATIVE
BUN SERPL-MCNC: 12 MG/DL (ref 6–24)
BUN/CREAT SERPL: 10 (ref 9–20)
CALCIUM SERPL-MCNC: 10.2 MG/DL (ref 8.7–10.2)
CHLORIDE SERPL-SCNC: 101 MMOL/L (ref 96–106)
CHOLEST SERPL-MCNC: 167 MG/DL (ref 100–199)
CO2 SERPL-SCNC: 25 MMOL/L (ref 20–29)
COLOR UR: YELLOW
CREAT SERPL-MCNC: 1.17 MG/DL (ref 0.76–1.27)
EGFR: 75 ML/MIN/1.73
EOSINOPHIL # BLD AUTO: 0.2 X10E3/UL (ref 0–0.4)
EOSINOPHIL NFR BLD AUTO: 4 %
ERYTHROCYTE [DISTWIDTH] IN BLOOD BY AUTOMATED COUNT: 13.4 % (ref 11.6–15.4)
GLOBULIN SER CALC-MCNC: 2.8 G/DL (ref 1.5–4.5)
GLUCOSE SERPL-MCNC: 105 MG/DL (ref 70–99)
GLUCOSE UR QL STRIP: NEGATIVE
HCT VFR BLD AUTO: 44.1 % (ref 37.5–51)
HDLC SERPL-MCNC: 34 MG/DL
HGB BLD-MCNC: 14.4 G/DL (ref 13–17.7)
HGB UR QL STRIP: NEGATIVE
IMM GRANULOCYTES # BLD AUTO: 0 X10E3/UL (ref 0–0.1)
IMM GRANULOCYTES NFR BLD AUTO: 0 %
IMP & REVIEW OF LAB RESULTS: NORMAL
KETONES UR QL STRIP: NEGATIVE
LDLC SERPL CALC-MCNC: 97 MG/DL (ref 0–99)
LEUKOCYTE ESTERASE UR QL STRIP: NEGATIVE
LYMPHOCYTES # BLD AUTO: 1.9 X10E3/UL (ref 0.7–3.1)
LYMPHOCYTES NFR BLD AUTO: 40 %
MCH RBC QN AUTO: 27.1 PG (ref 26.6–33)
MCHC RBC AUTO-ENTMCNC: 32.7 G/DL (ref 31.5–35.7)
MCV RBC AUTO: 83 FL (ref 79–97)
MICRO URNS: NORMAL
MONOCYTES # BLD AUTO: 0.4 X10E3/UL (ref 0.1–0.9)
MONOCYTES NFR BLD AUTO: 9 %
NEUTROPHILS # BLD AUTO: 2.2 X10E3/UL (ref 1.4–7)
NEUTROPHILS NFR BLD AUTO: 47 %
NITRITE UR QL STRIP: NEGATIVE
PH UR STRIP: 6 [PH] (ref 5–7.5)
PLATELET # BLD AUTO: 308 X10E3/UL (ref 150–450)
POTASSIUM SERPL-SCNC: 4.8 MMOL/L (ref 3.5–5.2)
PROT SERPL-MCNC: 7.4 G/DL (ref 6–8.5)
PROT UR QL STRIP: NEGATIVE
RBC # BLD AUTO: 5.32 X10E6/UL (ref 4.14–5.8)
SODIUM SERPL-SCNC: 141 MMOL/L (ref 134–144)
SP GR UR STRIP: 1.02 (ref 1–1.03)
TRIGL SERPL-MCNC: 207 MG/DL (ref 0–149)
UROBILINOGEN UR STRIP-MCNC: 0.2 MG/DL (ref 0.2–1)
VLDLC SERPL CALC-MCNC: 36 MG/DL (ref 5–40)
WBC # BLD AUTO: 4.7 X10E3/UL (ref 3.4–10.8)

## 2022-10-20 ENCOUNTER — CLINICAL SUPPORT (OUTPATIENT)
Dept: FAMILY MEDICINE CLINIC | Age: 52
End: 2022-10-20

## 2022-10-20 VITALS
WEIGHT: 225 LBS | BODY MASS INDEX: 33.33 KG/M2 | DIASTOLIC BLOOD PRESSURE: 88 MMHG | TEMPERATURE: 97.4 F | OXYGEN SATURATION: 93 % | SYSTOLIC BLOOD PRESSURE: 127 MMHG | HEIGHT: 69 IN | HEART RATE: 69 BPM | RESPIRATION RATE: 17 BRPM

## 2022-10-20 DIAGNOSIS — Z01.30 BP CHECK: Primary | ICD-10-CM

## 2022-10-20 NOTE — PROGRESS NOTES
Per NATI Akhtar instructions patient presents today for a blood pressure check. Patient seated and resting for 15 minutes with both feet flat on the floor. Blood pressure taken and documented. Reported blood pressure to NATI Akhtar. Patient states he took medication today.

## 2022-11-18 DIAGNOSIS — I10 BENIGN HYPERTENSION WITHOUT CHF: ICD-10-CM

## 2022-11-18 DIAGNOSIS — Z76.0 MEDICATION REFILL: ICD-10-CM

## 2022-11-18 RX ORDER — METOPROLOL SUCCINATE 25 MG/1
TABLET, EXTENDED RELEASE ORAL
Qty: 90 TABLET | Refills: 0 | Status: SHIPPED | OUTPATIENT
Start: 2022-11-18 | End: 2022-11-22 | Stop reason: SDUPTHER

## 2022-11-22 DIAGNOSIS — I10 BENIGN HYPERTENSION WITHOUT CHF: ICD-10-CM

## 2022-11-22 DIAGNOSIS — Z76.0 MEDICATION REFILL: ICD-10-CM

## 2022-11-22 RX ORDER — METOPROLOL SUCCINATE 25 MG/1
TABLET, EXTENDED RELEASE ORAL
Qty: 90 TABLET | Refills: 0 | Status: SHIPPED | OUTPATIENT
Start: 2022-11-22

## 2022-11-25 ENCOUNTER — TELEPHONE (OUTPATIENT)
Dept: FAMILY MEDICINE CLINIC | Age: 52
End: 2022-11-25

## 2022-11-25 NOTE — TELEPHONE ENCOUNTER
Notified patient of lab results. Pt admits to taking cholesterol medications but has been eating a lot of fried foods. Pt states will stay on same dose and work on diet. Will reassess at visit next month.

## 2022-12-15 ENCOUNTER — VIRTUAL VISIT (OUTPATIENT)
Dept: FAMILY MEDICINE CLINIC | Age: 52
End: 2022-12-15

## 2022-12-15 DIAGNOSIS — I10 BENIGN HYPERTENSION WITHOUT CHF: ICD-10-CM

## 2022-12-15 DIAGNOSIS — C61 PROSTATE CANCER (HCC): ICD-10-CM

## 2022-12-15 DIAGNOSIS — F20.9 SCHIZOPHRENIA, UNSPECIFIED TYPE (HCC): Primary | ICD-10-CM

## 2022-12-15 DIAGNOSIS — E78.5 HYPERLIPIDEMIA, UNSPECIFIED HYPERLIPIDEMIA TYPE: ICD-10-CM

## 2022-12-15 PROCEDURE — 99212 OFFICE O/P EST SF 10 MIN: CPT | Performed by: NURSE PRACTITIONER

## 2022-12-15 NOTE — PROGRESS NOTES
Chinedu Blackwell (: 1970) is a 47 y.o. male, established patient, here for evaluation of the following chief complaint(s):   No chief complaint on file. Cholesterol levels  --working on diet     Next Thursday-last day of radiation for prostate CA     Patient seeing Dr. Maria D Pennington 2023    ASSESSMENT/PLAN:  Below is the assessment and plan developed based on review of pertinent history, labs, studies, and medications. 1. Schizophrenia, unspecified type ([facility]  2. Prostate cancer ([facility]  3. Hyperlipidemia, unspecified hyperlipidemia type  4. Benign hypertension without CHF    Return in about 3 months (around 3/15/2023).     SUBJECTIVE/OBJECTIVE:  HPI    Review of Systems     No data recorded     Physical Exam    [INSTRUCTIONS:  \"[x]\" Indicates a positive item  \"[]\" Indicates a negative item  -- DELETE ALL ITEMS NOT EXAMINED]    Constitutional: [x] Appears well-developed and well-nourished [x] No apparent distress      [] Abnormal -     Mental status: [x] Alert and awake  [x] Oriented to person/place/time [x] Able to follow commands    [] Abnormal -     Eyes:   EOM    [x]  Normal    [] Abnormal -   Sclera  [x]  Normal    [] Abnormal -          Discharge [x]  None visible   [] Abnormal -     HENT: [x] Normocephalic, atraumatic  [] Abnormal -   [x] Mouth/Throat: Mucous membranes are moist    External Ears [x] Normal  [] Abnormal -    Neck: [x] No visualized mass [] Abnormal -     Pulmonary/Chest: [x] Respiratory effort normal   [x] No visualized signs of difficulty breathing or respiratory distress        [] Abnormal -      Musculoskeletal:   [x] Normal gait with no signs of ataxia         [x] Normal range of motion of neck        [] Abnormal -     Neurological:        [x] No Facial Asymmetry (Cranial nerve 7 motor function) (limited exam due to video visit)          [x] No gaze palsy        [] Abnormal -          Skin:        [x] No significant exanthematous lesions or discoloration noted on facial skin         [] Abnormal -            Psychiatric:       [x] Normal Affect [] Abnormal -        [x] No Hallucinations    Other pertinent observable physical exam findings:-    On this date 12/15/2022 I have spent 5 minutes reviewing previous notes, test results and face to face (virtual) with the patient discussing the diagnosis and importance of compliance with the treatment plan as well as documenting on the day of the visit. Kimmie Monroe, was evaluated through a synchronous (real-time) audio-video encounter. The patient (or guardian if applicable) is aware that this is a billable service, which includes applicable co-pays. This Virtual Visit was conducted with patient's (and/or legal guardian's) consent. The visit was conducted pursuant to the emergency declaration under the [address] [place] [address], [address] [place] [address] authority and the [employer] and [employer] General Act. Patient identification was verified, and a caregiver was present when appropriate. The patient was located at: Home: [place] [id number]  The provider was located at: Home: [unfilled]       An electronic signature was used to authenticate this note.   -- Jodie Cha, NP

## 2023-01-13 ENCOUNTER — OFFICE VISIT (OUTPATIENT)
Dept: CARDIOLOGY CLINIC | Age: 53
End: 2023-01-13

## 2023-01-13 VITALS
SYSTOLIC BLOOD PRESSURE: 106 MMHG | BODY MASS INDEX: 33.52 KG/M2 | DIASTOLIC BLOOD PRESSURE: 60 MMHG | TEMPERATURE: 97.8 F | HEART RATE: 79 BPM | WEIGHT: 227 LBS | OXYGEN SATURATION: 98 %

## 2023-01-13 DIAGNOSIS — K21.9 GASTROESOPHAGEAL REFLUX DISEASE WITHOUT ESOPHAGITIS: ICD-10-CM

## 2023-01-13 DIAGNOSIS — I10 PRIMARY HYPERTENSION: Primary | ICD-10-CM

## 2023-01-13 DIAGNOSIS — R07.9 CHEST PAIN, UNSPECIFIED TYPE: ICD-10-CM

## 2023-01-13 RX ORDER — OMEPRAZOLE 20 MG/1
20 CAPSULE, DELAYED RELEASE ORAL 2 TIMES DAILY
Qty: 60 CAPSULE | Refills: 3 | Status: SHIPPED | OUTPATIENT
Start: 2023-01-13

## 2023-01-13 NOTE — PATIENT INSTRUCTIONS
Testing   Echo  **call office 3-5 days after testing is completed for results**     New Medication/Medication Changes    Increase prilosec 20 mg to twice daily     **please allow 24-48 hrs for medication to be escribed to pharmacy** If you need any refills on medications please contact your pharmacy so that the request can be escribed to the provider for review seven to 10 days prior to being out of medication.       New Location Address- projected for the month of February 2023    [address] [facility]  [facility] [address] [place] [address]

## 2023-01-13 NOTE — LETTER
1/13/2023    Patient: Leonila Ordaz   YOB: 1970   Date of Visit: 1/13/2023     Tram Breen, [address] [place] [address] [address]  [facility] [phone] [id number]  [address] [facility] [address]    Dear Tram Breen, NP,      Thank you for referring Mr. Leonila Ordaz to [facility] for evaluation. My notes for this consultation are attached. If you have questions, please do not hesitate to call me. I look forward to following your patient along with you.       Sincerely,    Alexandria Sanchez MD

## 2023-01-13 NOTE — PROGRESS NOTES
Identified pt with two pt identifiers(name and ). Reviewed record in preparation for visit and have obtained necessary documentation. Gaby Lovett presents today for   Chief Complaint   Patient presents with    New Patient       Pt c/o DIZZINESS. Gaby Castror preferred language for health care discussion is english/other. Personal Protective Equipment:   Personal Protective Equipment was used including: mask-surgical and hands-gloves. Patient was placed on no precaution(s). Patient was masked. Precautions:   Patient currently on None  Patient currently roomed with door closed. Is someone accompanying this pt? no    Is the patient using any DME equipment during [address]? no    Depression Screening:  3 most recent PHQ Screens 2023   Little interest or pleasure in doing things Not at all   Feeling down, depressed, irritable, or hopeless Not at all   Total Score PHQ 2 0       Learning Assessment:  Learning Assessment 2016   PRIMARY LEARNER Patient   HIGHEST LEVEL OF EDUCATION - PRIMARY LEARNER  -   PRIMARY LANGUAGE ENGLISH   LEARNER PREFERENCE PRIMARY LISTENING   ANSWERED BY self   RELATIONSHIP SELF       Abuse Screening:  Abuse Screening Questionnaire 10/25/2018   Do you ever feel afraid of your partner? N   Are you in a relationship with someone who physically or mentally threatens you? N   Is it safe for you to go home? Y       Fall Risk  Fall Risk Assessment, last 12 mths 2014   Able to walk? Yes   Fall in past 12 months? No       Pt currently taking Anticoagulant therapy? no  Pt currently taking Antiplatelet therapy? no    Coordination of Care:  1. Have you been to the ER, urgent care clinic since your last visit? Hospitalized since your last visit? no    2. Have you seen or consulted any other health care providers outside of the [address] [place] [address] since your last visit? Include any pap smears or colon screening.  no      Please see Red banners under Allergies and Med Rec to remove outside inquires. All correct information has been verified with patient and added to chart.      Medication's patient's would liked removed has been marked not taking to be removed per Verbal order and read back per Pebbles Roth MD

## 2023-01-13 NOTE — PROGRESS NOTES
[facility]    Mr. Sara Harrison is 50-year-old male    Patient is here today for cardiac evaluation  He denies prior history of CHF  According to patient he may have had a mild heart attack in 2013 and cardiac catheterization at the time was unremarkable  Patient does not complain of any chest pain or chest tightness. He denies any shortness of breath. His main concern is some heartburn which has been experiencing especially with certain kind of food including but not limited to [place] and [place] food. He is taking Prilosec 20 mg daily. Denies any lower extremity swelling or any exertional shortness of breath. No palpitation, presyncope or syncope  Denies any nausea, vomiting, abdominal pain, fever, chills, sputum production. No hematuria or other bleeding complaints    Past Medical History:   Diagnosis Date    Advance directive discussed with patient 05/03/2017    Arthritis     left knee    Asthma     CAD (coronary artery disease)     heart attack 6/2012, Being followed by Dr. Augie Valdes [facility])     Cocaine use 2017    Depression     Diabetes ([address]     DVT (deep venous thrombosis) ([address]     left leg in the past (Pt was on coumadin)    GERD (gastroesophageal reflux disease)     Hyperlipidemia 07/09/2018    cramps with pravastatin. Consider alternate therapy after surgery    Hypertension     Schizophrenia ([address]     Will be seeing [employer]    Type 2 diabetes mellitus without complication, without long-term current use of insulin ([address]     Unspecified sleep apnea     cpap machine,Will be seeing neurologist       Review of Systems:  Cardiac symptoms as noted above in HPI. All others negative. Denies fatigue, malaise, skin rash, joint pain, blurring vision, photophobia, neck pain, hemoptysis, chronic cough, nausea, vomiting, hematuria, burning micturition, BRBPR, chronic headaches.     Current Outpatient Medications Medication Sig    metoprolol succinate (TOPROL-XL) 25 mg XL tablet Take 1 tablet by mouth once daily    lisinopriL (PRINIVIL, ZESTRIL) 40 mg tablet TAKE 1 TABLET BY MOUTH ONCE DAILY    atorvastatin (LIPITOR) 20 mg tablet Take 1 tablet by mouth once daily    nitroglycerin (Nitrostat) 0.4 mg SL tablet 1 Tablet by SubLINGual route every five (5) minutes as needed for Chest Pain. omeprazole (PRILOSEC) 20 mg capsule Take 1 Capsule by mouth daily. fenofibrate nanocrystallized (TRICOR) 48 mg tablet Take 1 Tablet by mouth daily. OTHER daily. Beta prostate advanced    Accu-Chek Guide test strips strip USE 1 STRIP TO CHECK GLUCOSE ONCE DAILY    methocarbamoL (ROBAXIN) 750 mg tablet Take 1 Tablet by mouth four (4) times daily. (Patient taking differently: Take 750 mg by mouth as needed.)    lidocaine (LIDODERM) 5 % Apply patch to the affected area for 12 hours a day and remove for 12 hours a day. diclofenac (VOLTAREN) 1 % gel Apply  to affected area four (4) times daily. albuterol (PROVENTIL HFA, VENTOLIN HFA, PROAIR HFA) 90 mcg/actuation inhaler Take 1-2 puffs every 4-6 hrs prn shortness of breath    lancets misc Use to to check blood sugar once a day. Please dispense accu-chek fastclix lancet drum    therapeutic multivitamin (THERAGRAN) tablet Take 1 Tab by mouth daily. No current facility-administered medications for this visit.        Past Surgical History:   Procedure Laterality Date    HX COLONOSCOPY      HX HEART CATHETERIZATION  2014    cardiac cath - normal coronaries    HX HERNIA REPAIR      HX KNEE REPLACEMENT      left TKR    HX ORTHOPAEDIC      L knee 1987, 1996    HX OTHER SURGICAL      eye sx at 2 yrs old    HX UROLOGICAL  07/25/2022    TRANSRECTAL ULTRASOUND AND BIOPSY OF PROSTATE; Kallie Arnett    [place] UNLISTED PROCEDURE ABDOMEN PERITONEUM & OMENTUM         Allergies and Sensitivities:  Allergies   Allergen Reactions    Iodine Anaphylaxis    Norco [Hydrocodone-Acetaminophen] Nausea and Vomiting     Gi distress    Shellfish Derived Swelling and Angioedema     Other reaction(s): anaphylaxis/angioedema  SHRIMP AND CRAB    Acetaminophen Other (comments)    Metformin Diarrhea       Family History:  Family History   Problem Relation Age of Onset    Anemia Mother     Cancer Father         prostate    Cancer Brother     Diabetes Brother        Social History:  Social History     Tobacco Use    Smoking status: Former     Packs/day: 0.25     Types: Cigars, Cigarettes    Smokeless tobacco: Never    Tobacco comments:     black and mild per day   Vaping Use    Vaping Use: Never used   Substance Use Topics    Alcohol use: Not Currently     Comment: \"a little\"/ occ    Drug use: Not Currently     Types: Marijuana     Comment: pt denies but pos for cocaine in 2017 and pos for marijuana in 2018     He  reports that he has quit smoking. His smoking use included cigars and cigarettes. He smoked an average of .25 packs per day. He has never used smokeless tobacco.  He  reports that he does not currently use alcohol. Physical Exam:  BP Readings from Last 3 Encounters:   01/13/23 106/60   10/20/22 127/88   10/06/22 (!) 140/88         Pulse Readings from Last 3 Encounters:   01/13/23 79   10/20/22 69   10/06/22 69          Wt Readings from Last 3 Encounters:   01/13/23 103 kg (227 lb)   10/20/22 102.1 kg (225 lb)   10/06/22 102.3 kg (225 lb 9.6 oz)       Constitutional: Oriented to person, place, and time. HENT: Head: Normocephalic and atraumatic. Eyes: Conjunctivae and extraocular motions are normal.   Neck: No JVD present. Carotid bruit is not appreciated. Cardiovascular: Regular rhythm. No murmur, gallop or rubs appreciated  Lung: Breath sounds normal. No respiratory distress. No ronchi or rales appreciated  Abdominal: No tenderness. No rebound and no guarding. Musculoskeletal: There is no lower extremity edema. No cynosis  Lymphadenopathy:  No cervical or supraclavicular adenopathy appriciated. Neurological: No gross motor deficit noted. Skin: No visible skin rash noted. No Ear discharge noted  Psychiatric: Normal mood and affect. LABS:   @  Lab Results   Component Value Date/Time    WBC 4.7 10/06/2022 08:43 AM    Hemoglobin, POC 15.0 2017 07:54 AM    HGB 14.4 10/06/2022 08:43 AM    Hematocrit, POC 44 2017 07:54 AM    HCT 44.1 10/06/2022 08:43 AM    PLATELET 808 1829 08:43 AM    MCV 83 10/06/2022 08:43 AM     Lab Results   Component Value Date/Time    Sodium 141 10/06/2022 08:43 AM    Potassium 4.8 10/06/2022 08:43 AM    Chloride 101 10/06/2022 08:43 AM    CO2 25 10/06/2022 08:43 AM    Glucose 105 (H) 10/06/2022 08:43 AM    BUN 12 10/06/2022 08:43 AM    Creatinine 1.17 10/06/2022 08:43 AM     Lipids Latest Ref Rng & Units 10/6/2022 8/10/2021 10/20/2020 2019 2018   Chol, Total 100 - 199 mg/dL 167 108 119 182 172   HDL >39 mg/dL 34(L) 31(L) 35(L) 40 36(L)   LDL 0 - 99 mg/dL 97 33 50.4 101. 4(H) 78   Trig 0 - 149 mg/dL 207(H) 295(H) 168(H) 203(H) 290(H)   Chol/HDL Ratio 0 - 5.0   - - 3.4 4.6 4.8   Some recent data might be hidden     Lab Results   Component Value Date/Time    ALT (SGPT) 36 10/06/2022 08:43 AM     Lab Results   Component Value Date/Time    Hemoglobin A1c 6.5 (H) 2021 12:00 AM    Hemoglobin A1c (POC) 6.0 2021 12:00 AM     Lab Results   Component Value Date/Time    TSH 3.20 2018 07:45 AM     EK2023: Sinus rhythm at 71 bpm.  Possible LVH. No ST-T changes of ischemia. ECHO ADULT COMPLETE 2018  Interpretation Summary  · Calculated left ventricular ejection fraction is 60%. Left ventricular mild concentric hypertrophy. Normal left ventricular wall motion, no regional wall motion abnormality noted. · Mild mitral annular calcification. Trace mitral valve regurgitation.     STRESS TEST (EST, PHARM, NUC, ECHO etc)    CATHETERIZATION    IMPRESSION & PLAN:  Mr. Nolan Amaro is 58-year-old male    Epigastric discomfort:  Based on symptoms, seems like this is GERD unless proven otherwise. He is taking Prilosec 20 mg daily. Have asked patient to discuss with PCP or GI team meanwhile I have asked him to increase omeprazole dose to 20 mg twice daily. If no improvement in symptoms, he should seek medical attention. Patient had cardiac catheterization in 2013 which showed normal angiographic coronary artery  Nuclear stress test in 2019, low risk  If no improvement, can consider coronary evaluation with stress test again however doubt this is angina at this time    Hypertension: /60. Currently on metoprolol, lisinopril. Recommend to continue same  Will order echo to rule out any pericardial disease    Hyperlipidemia: Currently on atorvastatin 20 mg daily. Last LDL 97. Continue same    This plan was discussed with patient who is in agreement. Thank you for allowing me to participate in patient care. Please feel free to call me if you have any question or concern. Joshua Graham MD  Please note: This document has been produced using voice recognition software. Unrecognized errors in transcription may be present.

## 2023-02-13 DIAGNOSIS — I25.119 CORONARY ARTERY DISEASE INVOLVING NATIVE CORONARY ARTERY OF NATIVE HEART WITH ANGINA PECTORIS (HCC): ICD-10-CM

## 2023-02-13 DIAGNOSIS — I11.9 HYPERTENSIVE HEART DISEASE WITHOUT HEART FAILURE: ICD-10-CM

## 2023-02-13 DIAGNOSIS — I10 BENIGN HYPERTENSION WITHOUT CHF: Primary | ICD-10-CM

## 2023-07-06 RX ORDER — QUETIAPINE FUMARATE 100 MG/1
TABLET, FILM COATED ORAL
COMMUNITY
Start: 2020-03-03

## 2023-07-06 RX ORDER — PAROXETINE HYDROCHLORIDE 20 MG/1
TABLET, FILM COATED ORAL DAILY
COMMUNITY
Start: 2018-04-30

## 2023-07-06 RX ORDER — LIRAGLUTIDE 6 MG/ML
INJECTION SUBCUTANEOUS
Qty: 3 ML | Refills: 0 | Status: SHIPPED | OUTPATIENT
Start: 2023-07-06

## 2023-07-13 RX ORDER — LANCETS
EACH MISCELLANEOUS
Qty: 102 EACH | Refills: 0 | OUTPATIENT
Start: 2023-07-13

## 2023-08-11 RX ORDER — LIRAGLUTIDE 6 MG/ML
INJECTION SUBCUTANEOUS
Qty: 3 ML | Refills: 0 | Status: SHIPPED | OUTPATIENT
Start: 2023-08-11

## 2023-10-06 RX ORDER — METOPROLOL SUCCINATE 25 MG/1
TABLET, EXTENDED RELEASE ORAL
Qty: 90 TABLET | Refills: 0 | OUTPATIENT
Start: 2023-10-06

## 2023-10-06 RX ORDER — LIRAGLUTIDE 6 MG/ML
INJECTION SUBCUTANEOUS
Qty: 9 ML | Refills: 0 | OUTPATIENT
Start: 2023-10-06

## 2023-12-19 RX ORDER — METOPROLOL SUCCINATE 25 MG/1
TABLET, EXTENDED RELEASE ORAL
Qty: 90 TABLET | Refills: 0 | OUTPATIENT
Start: 2023-12-19

## 2023-12-19 RX ORDER — LIRAGLUTIDE 6 MG/ML
INJECTION SUBCUTANEOUS
Qty: 9 ML | Refills: 0 | OUTPATIENT
Start: 2023-12-19

## 2023-12-19 RX ORDER — LISINOPRIL 40 MG/1
TABLET ORAL
Qty: 90 TABLET | Refills: 0 | OUTPATIENT
Start: 2023-12-19

## 2023-12-29 ENCOUNTER — TELEPHONE (OUTPATIENT)
Facility: CLINIC | Age: 53
End: 2023-12-29

## 2023-12-29 NOTE — TELEPHONE ENCOUNTER
Patient's wife stated that he is all out of all his medication.  His BP and Diabetic medication is as follows:  Atorvastatin 20 mg tablets  Liraglutide 18 mg/3ml SOPN SC injection  Lisinopril 40 mg tablets  Metoprolol succinate 25 mg extended release tablet    Patient is schedule to be sen on 1/17.  New pharmacy is [facility], [address].    Please give Patient a call if Rx's are sent to the pharmacy.

## 2024-01-02 RX ORDER — METOPROLOL SUCCINATE 25 MG/1
25 TABLET, EXTENDED RELEASE ORAL DAILY
Qty: 30 TABLET | Refills: 0 | Status: SHIPPED | OUTPATIENT
Start: 2024-01-02

## 2024-01-02 RX ORDER — ATORVASTATIN CALCIUM 20 MG/1
20 TABLET, FILM COATED ORAL DAILY
Qty: 30 TABLET | Refills: 0 | Status: SHIPPED | OUTPATIENT
Start: 2024-01-02

## 2024-01-02 RX ORDER — LIRAGLUTIDE 6 MG/ML
INJECTION SUBCUTANEOUS
Qty: 3 ML | Refills: 0 | Status: SHIPPED | OUTPATIENT
Start: 2024-01-02

## 2024-01-02 RX ORDER — LISINOPRIL 40 MG/1
40 TABLET ORAL DAILY
Qty: 30 TABLET | Refills: 0 | Status: SHIPPED | OUTPATIENT
Start: 2024-01-02

## 2024-01-17 ENCOUNTER — OFFICE VISIT (OUTPATIENT)
Facility: CLINIC | Age: 54
End: 2024-01-17
Payer: MEDICARE

## 2024-01-17 VITALS
TEMPERATURE: 98.4 F | OXYGEN SATURATION: 95 % | BODY MASS INDEX: 30.51 KG/M2 | HEIGHT: 69 IN | WEIGHT: 206 LBS | HEART RATE: 63 BPM | DIASTOLIC BLOOD PRESSURE: 80 MMHG | SYSTOLIC BLOOD PRESSURE: 136 MMHG | RESPIRATION RATE: 18 BRPM

## 2024-01-17 DIAGNOSIS — S61.219A LACERATION OF FINGER OF LEFT HAND, FOREIGN BODY PRESENCE UNSPECIFIED, NAIL DAMAGE STATUS UNSPECIFIED, UNSPECIFIED FINGER, INITIAL ENCOUNTER: ICD-10-CM

## 2024-01-17 DIAGNOSIS — R22.1 LOCALIZED SWELLING, MASS AND LUMP, NECK: ICD-10-CM

## 2024-01-17 DIAGNOSIS — J45.40 MODERATE PERSISTENT ASTHMA WITHOUT COMPLICATION: ICD-10-CM

## 2024-01-17 DIAGNOSIS — E11.3299 TYPE 2 DIABETES MELLITUS WITH MILD NONPROLIFERATIVE RETINOPATHY WITHOUT MACULAR EDEMA, WITHOUT LONG-TERM CURRENT USE OF INSULIN, UNSPECIFIED LATERALITY (HCC): ICD-10-CM

## 2024-01-17 DIAGNOSIS — K21.9 GASTRO-ESOPHAGEAL REFLUX DISEASE WITHOUT ESOPHAGITIS: ICD-10-CM

## 2024-01-17 DIAGNOSIS — Z13.89 SCREENING FOR HEMATURIA OR PROTEINURIA: ICD-10-CM

## 2024-01-17 DIAGNOSIS — Z91.013 SHELLFISH ALLERGY: ICD-10-CM

## 2024-01-17 DIAGNOSIS — Z11.4 ENCOUNTER FOR SCREENING FOR HIV: ICD-10-CM

## 2024-01-17 DIAGNOSIS — H54.8 LEGAL BLINDNESS, AS DEFINED IN UNITED STATES OF AMERICA: ICD-10-CM

## 2024-01-17 DIAGNOSIS — Z13.0 SCREENING FOR DEFICIENCY ANEMIA: ICD-10-CM

## 2024-01-17 DIAGNOSIS — C61 MALIGNANT NEOPLASM OF PROSTATE (HCC): ICD-10-CM

## 2024-01-17 DIAGNOSIS — I25.119 ATHEROSCLEROSIS OF NATIVE CORONARY ARTERY WITH ANGINA PECTORIS, UNSPECIFIED WHETHER NATIVE OR TRANSPLANTED HEART (HCC): ICD-10-CM

## 2024-01-17 DIAGNOSIS — E11.65 TYPE 2 DIABETES MELLITUS WITH HYPERGLYCEMIA, WITHOUT LONG-TERM CURRENT USE OF INSULIN (HCC): ICD-10-CM

## 2024-01-17 DIAGNOSIS — E78.5 HYPERLIPIDEMIA, UNSPECIFIED HYPERLIPIDEMIA TYPE: ICD-10-CM

## 2024-01-17 DIAGNOSIS — F20.9 SCHIZOPHRENIA, UNSPECIFIED TYPE (HCC): ICD-10-CM

## 2024-01-17 DIAGNOSIS — E11.3299 MILD NONPROLIFERATIVE DIABETIC RETINOPATHY ASSOCIATED WITH TYPE 2 DIABETES MELLITUS, MACULAR EDEMA PRESENCE UNSPECIFIED, UNSPECIFIED LATERALITY (HCC): Primary | ICD-10-CM

## 2024-01-17 PROCEDURE — 3051F HG A1C>EQUAL 7.0%<8.0%: CPT | Performed by: NURSE PRACTITIONER

## 2024-01-17 PROCEDURE — 3079F DIAST BP 80-89 MM HG: CPT | Performed by: NURSE PRACTITIONER

## 2024-01-17 PROCEDURE — 99214 OFFICE O/P EST MOD 30 MIN: CPT | Performed by: NURSE PRACTITIONER

## 2024-01-17 PROCEDURE — 3075F SYST BP GE 130 - 139MM HG: CPT | Performed by: NURSE PRACTITIONER

## 2024-01-17 RX ORDER — LIDOCAINE 50 MG/G
PATCH TOPICAL
Qty: 30 PATCH | Refills: 2 | Status: SHIPPED | OUTPATIENT
Start: 2024-01-17

## 2024-01-17 RX ORDER — CEPHALEXIN 250 MG/1
250 CAPSULE ORAL 4 TIMES DAILY
Qty: 28 CAPSULE | Refills: 0 | Status: SHIPPED | OUTPATIENT
Start: 2024-01-17 | End: 2024-01-24

## 2024-01-17 RX ORDER — OMEPRAZOLE 20 MG/1
20 CAPSULE, DELAYED RELEASE ORAL DAILY
Qty: 90 CAPSULE | Refills: 2 | Status: SHIPPED | OUTPATIENT
Start: 2024-01-17

## 2024-01-17 RX ORDER — FLUTICASONE PROPIONATE AND SALMETEROL 250; 50 UG/1; UG/1
1 POWDER RESPIRATORY (INHALATION) EVERY 12 HOURS
Qty: 60 EACH | Refills: 3 | Status: SHIPPED | OUTPATIENT
Start: 2024-01-17

## 2024-01-17 RX ORDER — EPINEPHRINE 0.3 MG/.3ML
0.3 INJECTION SUBCUTANEOUS ONCE
Qty: 1 EACH | Refills: 0 | Status: SHIPPED | OUTPATIENT
Start: 2024-01-17 | End: 2024-01-17

## 2024-01-17 RX ORDER — ALBUTEROL SULFATE 90 UG/1
AEROSOL, METERED RESPIRATORY (INHALATION)
Qty: 18 G | Refills: 2 | Status: SHIPPED | OUTPATIENT
Start: 2024-01-17

## 2024-01-17 RX ORDER — SILDENAFIL 100 MG/1
100 TABLET, FILM COATED ORAL PRN
Qty: 20 TABLET | Refills: 5 | Status: CANCELLED | OUTPATIENT
Start: 2024-01-17

## 2024-01-17 ASSESSMENT — PATIENT HEALTH QUESTIONNAIRE - PHQ9
SUM OF ALL RESPONSES TO PHQ QUESTIONS 1-9: 0
SUM OF ALL RESPONSES TO PHQ QUESTIONS 1-9: 0
SUM OF ALL RESPONSES TO PHQ9 QUESTIONS 1 & 2: 0
SUM OF ALL RESPONSES TO PHQ QUESTIONS 1-9: 0
2. FEELING DOWN, DEPRESSED OR HOPELESS: 0
SUM OF ALL RESPONSES TO PHQ QUESTIONS 1-9: 0
1. LITTLE INTEREST OR PLEASURE IN DOING THINGS: 0

## 2024-01-17 NOTE — PROGRESS NOTES
Patient wants to be checked for shellfish allergy.     Luis Enrique David presents today for   Chief Complaint   Patient presents with    Follow-up    Hypertension    Finger Laceration     On left hand     Neck Pain     Lump on left side, rash     Discuss Medications     Victozia     Wheezing       Is someone accompanying this pt? no    Is the patient using any DME equipment during OV? No     Depression Screening:       No data to display                Learning Assessment:      Health Maintenance reviewed and discussed and ordered per Provider.    Health Maintenance Due   Topic Date Due    Hepatitis B vaccine (1 of 3 - 3-dose series) Never done    HIV screen  Never done    Shingles vaccine (1 of 2) Never done    Pneumococcal 0-64 years Vaccine (2 - PCV) 01/08/2021    A1C test (Diabetic or Prediabetic)  05/30/2022    Diabetic foot exam  08/06/2022    Diabetic Alb to Cr ratio (uACR) test  11/30/2022    COVID-19 Vaccine (3 - 2023-24 season) 09/01/2023    Lipids  10/06/2023    GFR test (Diabetes, CKD 3-4, OR last GFR 15-59)  10/06/2023    Depression Monitoring  01/13/2024   .      Coordination of Care:  1. Have you been to the ER, urgent care clinic since your last visit? Hospitalized since your last visit? Patient first January finger cut     2. Have you seen or consulted any other health care providers outside of the [facility] since your last visit? Include any pap smears or colon screening. Urology     3. For patients aged 45-75: Has the patient had a colonoscopy / FIT/ Cologuard? Yes       If the patient is female:    4. For patients aged 40-74: Has the patient had a mammogram within the past 2 years? N/a      5. For patients aged 21-65: Has the patient had a pap smear? N/a          
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      SH:  Social History     Tobacco Use    Smoking status: Former     Current packs/day: 0.25     Types: Cigarettes    Smokeless tobacco: Never   Substance Use Topics    Alcohol use: Not Currently    Drug use: Not Currently     Types: Marijuana (Weed)       Medications/Allergies:  Current Outpatient Medications on File Prior to Visit   Medication Sig Dispense Refill    atorvastatin (LIPITOR) 20 MG tablet Take 1 tablet by mouth daily 30 tablet 0    lisinopril (PRINIVIL;ZESTRIL) 40 MG tablet Take 1 tablet by mouth daily 30 tablet 0    metoprolol succinate (TOPROL XL) 25 MG extended release tablet Take 1 tablet by mouth daily 30 tablet 0    sildenafil (VIAGRA) 100 MG tablet Take 1 tablet by mouth as needed for Erectile Dysfunction 20 tablet 5    diclofenac sodium (VOLTAREN) 1 % GEL Apply topically 4 times daily       No current facility-administered medications on file prior to visit.        Allergies   Allergen Reactions    Hydrocodone-Acetaminophen Nausea And Vomiting     Gi distress    Iodine Anaphylaxis    Shellfish Allergy Angioedema, Swelling and Anaphylaxis     Other reaction(s): anaphylaxis/angioedema  SHRIMP AND CRAB    Shrimp Extract Allergy Skin Test Anaphylaxis    Acetaminophen Other (See Comments)

## 2024-01-18 LAB
ALBUMIN SERPL-MCNC: 4.9 G/DL (ref 3.8–4.9)
ALBUMIN/CREAT UR: <3 MG/G CREAT (ref 0–29)
ALBUMIN/GLOB SERPL: 2.3 {RATIO} (ref 1.2–2.2)
ALP SERPL-CCNC: 95 IU/L (ref 44–121)
ALT SERPL-CCNC: 31 IU/L (ref 0–44)
APPEARANCE UR: CLEAR
AST SERPL-CCNC: 37 IU/L (ref 0–40)
BASOPHILS # BLD AUTO: 0 X10E3/UL (ref 0–0.2)
BASOPHILS NFR BLD AUTO: 1 %
BILIRUB SERPL-MCNC: 0.3 MG/DL (ref 0–1.2)
BILIRUB UR QL STRIP: NEGATIVE
BUN SERPL-MCNC: 10 MG/DL (ref 6–24)
BUN/CREAT SERPL: 10 (ref 9–20)
CALCIUM SERPL-MCNC: 9.5 MG/DL (ref 8.7–10.2)
CHLORIDE SERPL-SCNC: 100 MMOL/L (ref 96–106)
CHOLEST SERPL-MCNC: 113 MG/DL (ref 100–199)
CO2 SERPL-SCNC: 25 MMOL/L (ref 20–29)
COLOR UR: YELLOW
CREAT SERPL-MCNC: 1.04 MG/DL (ref 0.76–1.27)
CREAT UR-MCNC: 103.5 MG/DL
EGFRCR SERPLBLD CKD-EPI 2021: 85 ML/MIN/1.73
EOSINOPHIL # BLD AUTO: 0.2 X10E3/UL (ref 0–0.4)
EOSINOPHIL NFR BLD AUTO: 5 %
ERYTHROCYTE [DISTWIDTH] IN BLOOD BY AUTOMATED COUNT: 12.7 % (ref 11.6–15.4)
GLOBULIN SER CALC-MCNC: 2.1 G/DL (ref 1.5–4.5)
GLUCOSE SERPL-MCNC: 112 MG/DL (ref 70–99)
GLUCOSE UR QL STRIP: NEGATIVE
HBA1C MFR BLD: 7 % (ref 4.8–5.6)
HCT VFR BLD AUTO: 44.1 % (ref 37.5–51)
HDLC SERPL-MCNC: 44 MG/DL
HGB BLD-MCNC: 14.3 G/DL (ref 13–17.7)
HGB UR QL STRIP: NEGATIVE
HIV 1+2 AB+HIV1 P24 AG SERPL QL IA: NON REACTIVE
IMM GRANULOCYTES # BLD AUTO: 0 X10E3/UL (ref 0–0.1)
IMM GRANULOCYTES NFR BLD AUTO: 0 %
KETONES UR QL STRIP: NEGATIVE
LDLC SERPL CALC-MCNC: 58 MG/DL (ref 0–99)
LEUKOCYTE ESTERASE UR QL STRIP: NEGATIVE
LYMPHOCYTES # BLD AUTO: 1.7 X10E3/UL (ref 0.7–3.1)
LYMPHOCYTES NFR BLD AUTO: 36 %
MCH RBC QN AUTO: 27.3 PG (ref 26.6–33)
MCHC RBC AUTO-ENTMCNC: 32.4 G/DL (ref 31.5–35.7)
MCV RBC AUTO: 84 FL (ref 79–97)
MICRO URNS: NORMAL
MICROALBUMIN UR-MCNC: <3 UG/ML
MONOCYTES # BLD AUTO: 0.4 X10E3/UL (ref 0.1–0.9)
MONOCYTES NFR BLD AUTO: 9 %
NEUTROPHILS # BLD AUTO: 2.3 X10E3/UL (ref 1.4–7)
NEUTROPHILS NFR BLD AUTO: 49 %
NITRITE UR QL STRIP: NEGATIVE
PH UR STRIP: 5.5 [PH] (ref 5–7.5)
PLATELET # BLD AUTO: 286 X10E3/UL (ref 150–450)
POTASSIUM SERPL-SCNC: 5 MMOL/L (ref 3.5–5.2)
PROT SERPL-MCNC: 7 G/DL (ref 6–8.5)
PROT UR QL STRIP: NEGATIVE
RBC # BLD AUTO: 5.23 X10E6/UL (ref 4.14–5.8)
SODIUM SERPL-SCNC: 139 MMOL/L (ref 134–144)
SP GR UR STRIP: 1.02 (ref 1–1.03)
SPECIMEN STATUS REPORT: NORMAL
TRIGL SERPL-MCNC: 45 MG/DL (ref 0–149)
UROBILINOGEN UR STRIP-MCNC: 0.2 MG/DL (ref 0.2–1)
VLDLC SERPL CALC-MCNC: 11 MG/DL (ref 5–40)
WBC # BLD AUTO: 4.7 X10E3/UL (ref 3.4–10.8)

## 2024-02-05 ENCOUNTER — TELEPHONE (OUTPATIENT)
Facility: CLINIC | Age: 54
End: 2024-02-05

## 2024-02-05 DIAGNOSIS — R22.1 LOCALIZED SWELLING, MASS AND LUMP, NECK: Primary | ICD-10-CM

## 2024-02-05 RX ORDER — LIDOCAINE 50 MG/G
PATCH TOPICAL
Qty: 30 PATCH | Refills: 2 | Status: SHIPPED | OUTPATIENT
Start: 2024-02-05 | End: 2024-03-18 | Stop reason: SDUPTHER

## 2024-02-05 RX ORDER — ATORVASTATIN CALCIUM 20 MG/1
20 TABLET, FILM COATED ORAL DAILY
Qty: 90 TABLET | Refills: 0 | Status: SHIPPED | OUTPATIENT
Start: 2024-02-05

## 2024-02-05 RX ORDER — METOPROLOL SUCCINATE 25 MG/1
25 TABLET, EXTENDED RELEASE ORAL DAILY
Qty: 90 TABLET | Refills: 1 | Status: SHIPPED | OUTPATIENT
Start: 2024-02-05

## 2024-02-05 RX ORDER — METOPROLOL SUCCINATE 25 MG/1
25 TABLET, EXTENDED RELEASE ORAL DAILY
Qty: 30 TABLET | Refills: 0 | OUTPATIENT
Start: 2024-02-05

## 2024-02-05 RX ORDER — LISINOPRIL 40 MG/1
40 TABLET ORAL DAILY
Qty: 30 TABLET | Refills: 0 | OUTPATIENT
Start: 2024-02-05

## 2024-02-05 RX ORDER — LISINOPRIL 40 MG/1
40 TABLET ORAL DAILY
Qty: 90 TABLET | Refills: 1 | Status: SHIPPED | OUTPATIENT
Start: 2024-02-05

## 2024-02-05 NOTE — TELEPHONE ENCOUNTER
----- Message from Starla Tsai sent at 2/2/2024 10:25 AM EST -----  Subject: Referral Request    Reason for referral request? Patient has an order for an ultrasound of his   neck. The order states ultrasound of the rt side of his neck, but it is   supposed to be an order for an ultrasound of the lt side of his neck. He   would like this corrected.   Provider patient wants to be referred to(if known):     Provider Phone Number(if known):    Additional Information for Provider?   ---------------------------------------------------------------------------  --------------  CALL BACK INFO    [phone]; OK to leave message on voicemail  ---------------------------------------------------------------------------  --------------

## 2024-02-19 DIAGNOSIS — E11.3299 TYPE 2 DIABETES MELLITUS WITH MILD NONPROLIFERATIVE RETINOPATHY WITHOUT MACULAR EDEMA, WITHOUT LONG-TERM CURRENT USE OF INSULIN, UNSPECIFIED LATERALITY (HCC): ICD-10-CM

## 2024-02-21 RX ORDER — SEMAGLUTIDE 0.68 MG/ML
INJECTION, SOLUTION SUBCUTANEOUS
Qty: 3 ML | Refills: 0 | Status: SHIPPED | OUTPATIENT
Start: 2024-02-21

## 2024-02-26 ENCOUNTER — OFFICE VISIT (OUTPATIENT)
Facility: CLINIC | Age: 54
End: 2024-02-26
Payer: MEDICARE

## 2024-02-26 VITALS
BODY MASS INDEX: 32.44 KG/M2 | HEIGHT: 69 IN | HEART RATE: 74 BPM | WEIGHT: 219 LBS | RESPIRATION RATE: 16 BRPM | OXYGEN SATURATION: 96 % | DIASTOLIC BLOOD PRESSURE: 80 MMHG | SYSTOLIC BLOOD PRESSURE: 128 MMHG

## 2024-02-26 DIAGNOSIS — E11.319 TYPE 2 DIABETES MELLITUS WITH RETINOPATHY, WITHOUT LONG-TERM CURRENT USE OF INSULIN, MACULAR EDEMA PRESENCE UNSPECIFIED, UNSPECIFIED LATERALITY, UNSPECIFIED RETINOPATHY SEVERITY (HCC): ICD-10-CM

## 2024-02-26 DIAGNOSIS — M25.562 CHRONIC PAIN OF LEFT KNEE: ICD-10-CM

## 2024-02-26 DIAGNOSIS — G89.29 CHRONIC PAIN OF LEFT KNEE: ICD-10-CM

## 2024-02-26 DIAGNOSIS — H54.8 LEGAL BLINDNESS, AS DEFINED IN UNITED STATES OF AMERICA: ICD-10-CM

## 2024-02-26 DIAGNOSIS — C61 MALIGNANT NEOPLASM OF PROSTATE (HCC): ICD-10-CM

## 2024-02-26 DIAGNOSIS — Z71.89 ADVANCE DIRECTIVE DISCUSSED WITH PATIENT: ICD-10-CM

## 2024-02-26 DIAGNOSIS — Z00.00 MEDICARE ANNUAL WELLNESS VISIT, SUBSEQUENT: Primary | ICD-10-CM

## 2024-02-26 DIAGNOSIS — D17.0 LIPOMA OF NECK: ICD-10-CM

## 2024-02-26 DIAGNOSIS — I25.10 ATHEROSCLEROSIS OF CORONARY ARTERY WITHOUT ANGINA PECTORIS, UNSPECIFIED VESSEL OR LESION TYPE, UNSPECIFIED WHETHER NATIVE OR TRANSPLANTED HEART: ICD-10-CM

## 2024-02-26 DIAGNOSIS — J45.20 MILD INTERMITTENT ASTHMA WITHOUT COMPLICATION: ICD-10-CM

## 2024-02-26 DIAGNOSIS — F20.9 SCHIZOPHRENIA, UNSPECIFIED TYPE (HCC): ICD-10-CM

## 2024-02-26 PROBLEM — S61.219A LACERATION OF FINGER OF LEFT HAND: Status: RESOLVED | Noted: 2024-01-17 | Resolved: 2024-02-26

## 2024-02-26 PROBLEM — C80.1: Status: RESOLVED | Noted: 2020-10-06 | Resolved: 2024-02-26

## 2024-02-26 PROBLEM — R68.81 EARLY SATIETY: Status: RESOLVED | Noted: 2021-08-06 | Resolved: 2024-02-26

## 2024-02-26 PROBLEM — R10.9 ABDOMINAL PAIN: Status: RESOLVED | Noted: 2021-08-06 | Resolved: 2024-02-26

## 2024-02-26 PROBLEM — R79.89 ABNORMAL LIVER FUNCTION TESTS: Status: RESOLVED | Noted: 2021-08-06 | Resolved: 2024-02-26

## 2024-02-26 PROBLEM — Z01.810 PRE-OPERATIVE CARDIOVASCULAR EXAMINATION: Status: RESOLVED | Noted: 2018-07-09 | Resolved: 2024-02-26

## 2024-02-26 PROCEDURE — G0439 PPPS, SUBSEQ VISIT: HCPCS | Performed by: NURSE PRACTITIONER

## 2024-02-26 PROCEDURE — 3079F DIAST BP 80-89 MM HG: CPT | Performed by: NURSE PRACTITIONER

## 2024-02-26 PROCEDURE — 3074F SYST BP LT 130 MM HG: CPT | Performed by: NURSE PRACTITIONER

## 2024-02-26 PROCEDURE — 3051F HG A1C>EQUAL 7.0%<8.0%: CPT | Performed by: NURSE PRACTITIONER

## 2024-02-26 RX ORDER — ASPIRIN 81 MG/1
81 TABLET, CHEWABLE ORAL DAILY
COMMUNITY

## 2024-02-26 SDOH — ECONOMIC STABILITY: INCOME INSECURITY: HOW HARD IS IT FOR YOU TO PAY FOR THE VERY BASICS LIKE FOOD, HOUSING, MEDICAL CARE, AND HEATING?: SOMEWHAT HARD

## 2024-02-26 SDOH — ECONOMIC STABILITY: HOUSING INSECURITY
IN THE LAST 12 MONTHS, WAS THERE A TIME WHEN YOU DID NOT HAVE A STEADY PLACE TO SLEEP OR SLEPT IN A SHELTER (INCLUDING NOW)?: NO

## 2024-02-26 SDOH — ECONOMIC STABILITY: FOOD INSECURITY: WITHIN THE PAST 12 MONTHS, THE FOOD YOU BOUGHT JUST DIDN'T LAST AND YOU DIDN'T HAVE MONEY TO GET MORE.: SOMETIMES TRUE

## 2024-02-26 SDOH — HEALTH STABILITY: PHYSICAL HEALTH: ON AVERAGE, HOW MANY MINUTES DO YOU ENGAGE IN EXERCISE AT THIS LEVEL?: 30 MIN

## 2024-02-26 SDOH — HEALTH STABILITY: PHYSICAL HEALTH: ON AVERAGE, HOW MANY DAYS PER WEEK DO YOU ENGAGE IN MODERATE TO STRENUOUS EXERCISE (LIKE A BRISK WALK)?: 3 DAYS

## 2024-02-26 SDOH — ECONOMIC STABILITY: FOOD INSECURITY: WITHIN THE PAST 12 MONTHS, YOU WORRIED THAT YOUR FOOD WOULD RUN OUT BEFORE YOU GOT MONEY TO BUY MORE.: SOMETIMES TRUE

## 2024-02-26 SDOH — ECONOMIC STABILITY: TRANSPORTATION INSECURITY
IN THE PAST 12 MONTHS, HAS LACK OF TRANSPORTATION KEPT YOU FROM MEETINGS, WORK, OR FROM GETTING THINGS NEEDED FOR DAILY LIVING?: NO

## 2024-02-26 ASSESSMENT — LIFESTYLE VARIABLES
HOW OFTEN DO YOU HAVE A DRINK CONTAINING ALCOHOL: MONTHLY OR LESS
HOW MANY STANDARD DRINKS CONTAINING ALCOHOL DO YOU HAVE ON A TYPICAL DAY: 1 OR 2
HOW OFTEN DO YOU HAVE SIX OR MORE DRINKS ON ONE OCCASION: 1
HOW OFTEN DO YOU HAVE A DRINK CONTAINING ALCOHOL: 2
HOW MANY STANDARD DRINKS CONTAINING ALCOHOL DO YOU HAVE ON A TYPICAL DAY: 1

## 2024-02-26 ASSESSMENT — PATIENT HEALTH QUESTIONNAIRE - PHQ9
SUM OF ALL RESPONSES TO PHQ QUESTIONS 1-9: 2
SUM OF ALL RESPONSES TO PHQ9 QUESTIONS 1 & 2: 2
1. LITTLE INTEREST OR PLEASURE IN DOING THINGS: 1
SUM OF ALL RESPONSES TO PHQ QUESTIONS 1-9: 2
2. FEELING DOWN, DEPRESSED OR HOPELESS: 1
SUM OF ALL RESPONSES TO PHQ QUESTIONS 1-9: 2
SUM OF ALL RESPONSES TO PHQ QUESTIONS 1-9: 2

## 2024-02-26 NOTE — ASSESSMENT & PLAN NOTE
Monitored by specialist- no acute findings meriting change in the plan  Seeing urology every 6 months; recent PSA 1.57

## 2024-02-26 NOTE — ASSESSMENT & PLAN NOTE
patient states stopped taking psychiatric medications; no longer seeing psychiatric. Denies SI/HI

## 2024-02-26 NOTE — ASSESSMENT & PLAN NOTE
Borderline controlled, continue current medications, medication adherence emphasized, and lifestyle modifications recommended\  A1c increased from 6.5 to 7%

## 2024-02-26 NOTE — PROGRESS NOTES
Medicare Annual Wellness Visit    Luis Enrique David is here for Medicare AWV    Assessment & Plan   Medicare annual wellness visit, subsequent  Schizophrenia, unspecified type (HCC)  Assessment & Plan:    patient states stopped taking psychiatric medications; no longer seeing psychiatric. Denies SI/HI  Legal blindness, as defined in [place]  Comments:  in right eye  Orders:  -     External Referral To Ophthalmology  Mild intermittent asthma without complication  Type 2 diabetes mellitus with retinopathy, without long-term current use of insulin, macular edema presence unspecified, unspecified laterality, unspecified retinopathy severity (HCC)  Assessment & Plan:   Borderline controlled, continue current medications, medication adherence emphasized, and lifestyle modifications recommended\  A1c increased from 6.5 to 7%  Orders:  -     External Referral To Ophthalmology  Lipoma of neck  Comments:  has been present over 4 years; no change in size  refuses surgical referral  Malignant neoplasm of prostate (HCC)  Assessment & Plan:   Monitored by specialist- no acute findings meriting change in the plan  Seeing urology every 6 months; recent PSA 1.57  Chronic pain of left knee  Atherosclerosis of coronary artery without angina pectoris, unspecified vessel or lesion type, unspecified whether native or transplanted heart  Assessment & Plan:   Monitored by specialist- no acute findings meriting change in the plan  Last saw cardiology 2023; will schedule for follow-up   On baby Aspirin 81 mg; denies chest pain  Advance directive discussed with patient  Recommendations for Preventive Services Due: see orders and patient instructions/AVS.  Recommended screening schedule for the next 5-10 years is provided to the patient in written form: see Patient Instructions/AVS.     Return in about 3 months (around 5/26/2024) for diabetes.     Subjective   HTN  Diabetes type 2     Reviewed ultrasound of neck showing lipoma; pt

## 2024-02-26 NOTE — PATIENT INSTRUCTIONS
[facility] Financial Resources*  (Call 211 if need more resources.)   Medical  [facility] Financial Assistance  What they offer: The [employer] Financial Assistance Program helps uninsured patients who do not qualify for government-sponsored health insurance and cannot afford to pay for their medical care. Insured patient may also qualify for assistance based on family income, family size, and medical needs.   Phone Number: [phone]  How to apply for the [employer] Financial Assistance Program:  Option 1: To apply for financial assistance, a patient (or their family or other               provider) should fill out the Financial Assistance Application. Copies of the Financial Assistance Application and the FAP may be obtained for free by calling the [employer] service department at [phone].  Option 2:  The Financial Assistance Application and policy may be obtained for free by downloading a copy from the [employer] website:                       https://www.[employer]/patient-resources/financial-assistance                         The [facility] Uninsured Program       www.[employer].org  What they offer: The [facility] Uninsured Program can assist you if you’re uninsured and have been diagnosed with chronic, debilitating, or life-threatening disease.   Phone Number: 1-627.100.1175  Website: www.[employer].org    [facility]  What they offer: Mobile health clinic for uninsured community members  Phone number: [phone]    Utilities  [employer]  What they offer: Partnership with the [place] Department of . Assist with finding and applying for government funded programs and benefits.  You can also update your benefits or report changes through [employer].  Website: https://www.artaculous.[place].gov/  Phone Number: 8335CALLVA (135-285-2768)    [employer] [place] [employer]  What they offer: [employer] is [employer]’s energy assistance program of last

## 2024-02-26 NOTE — ASSESSMENT & PLAN NOTE
Monitored by specialist- no acute findings meriting change in the plan  Last saw cardiology 2023; will schedule for follow-up   On baby Aspirin 81 mg; denies chest pain

## 2024-02-26 NOTE — PROGRESS NOTES
Luis Enrique David is a 54 y.o. male (: 1970) presenting to address:    Chief Complaint   Patient presents with    Medicare AWV       Vitals:    24 1311   Pulse: 74   Resp: 16   SpO2: 96%       Coordination of Care Questionaire:   1. \"Have you been to the ER, urgent care clinic since your last visit?  Hospitalized since your last visit?\" Yes    2. \"Have you seen or consulted any other health care providers outside of the [facility] since your last visit?\" No     3. For patients aged 45-75: Has the patient had a colonoscopy / FIT/ Cologuard? No      If the patient is female:    4. For patients aged 40-74: Has the patient had a mammogram within the past 2 years? N/A      5. For patients aged 21-65: Has the patient had a pap smear? N/A    Advanced Directive:   1. Do you have an Advanced Directive? No    2. Would you like information on Advanced Directives? no

## 2024-03-07 DIAGNOSIS — E11.3299 TYPE 2 DIABETES MELLITUS WITH MILD NONPROLIFERATIVE RETINOPATHY WITHOUT MACULAR EDEMA, WITHOUT LONG-TERM CURRENT USE OF INSULIN, UNSPECIFIED LATERALITY (HCC): ICD-10-CM

## 2024-03-07 RX ORDER — SEMAGLUTIDE 0.68 MG/ML
INJECTION, SOLUTION SUBCUTANEOUS
Qty: 3 ML | Refills: 0 | Status: SHIPPED | OUTPATIENT
Start: 2024-03-07

## 2024-03-15 ENCOUNTER — TELEPHONE (OUTPATIENT)
Facility: CLINIC | Age: 54
End: 2024-03-15

## 2024-03-15 DIAGNOSIS — E11.319 TYPE 2 DIABETES MELLITUS WITH RETINOPATHY, WITHOUT LONG-TERM CURRENT USE OF INSULIN, MACULAR EDEMA PRESENCE UNSPECIFIED, UNSPECIFIED LATERALITY, UNSPECIFIED RETINOPATHY SEVERITY (HCC): Primary | ICD-10-CM

## 2024-03-15 RX ORDER — BLOOD-GLUCOSE METER
1 KIT MISCELLANEOUS DAILY
Qty: 1 KIT | Refills: 0 | Status: SHIPPED | OUTPATIENT
Start: 2024-03-15

## 2024-03-15 NOTE — TELEPHONE ENCOUNTER
----- Message from Luis Enrique David sent at 3/15/2024  3:05 PM EDT -----  Regarding: Prescription home delivery  Contact: [phone]  I called the member pharmacy number on the back of my  [employer] [facility] card and the gave me the fax number to give to you to send my perscriptions

## 2024-03-15 NOTE — TELEPHONE ENCOUNTER
Spoke with patient and advised to either have mail order fax the medication for me to approve or get the name so we can add for his next refill.    Glucometer sent to local pharmacy.

## 2024-03-18 DIAGNOSIS — Z91.013 SHELLFISH ALLERGY: ICD-10-CM

## 2024-03-18 DIAGNOSIS — K21.9 GASTRO-ESOPHAGEAL REFLUX DISEASE WITHOUT ESOPHAGITIS: ICD-10-CM

## 2024-03-18 DIAGNOSIS — J45.40 MODERATE PERSISTENT ASTHMA WITHOUT COMPLICATION: ICD-10-CM

## 2024-03-18 DIAGNOSIS — E11.319 TYPE 2 DIABETES MELLITUS WITH RETINOPATHY, WITHOUT LONG-TERM CURRENT USE OF INSULIN, MACULAR EDEMA PRESENCE UNSPECIFIED, UNSPECIFIED LATERALITY, UNSPECIFIED RETINOPATHY SEVERITY (HCC): ICD-10-CM

## 2024-03-18 DIAGNOSIS — E11.3299 TYPE 2 DIABETES MELLITUS WITH MILD NONPROLIFERATIVE RETINOPATHY WITHOUT MACULAR EDEMA, WITHOUT LONG-TERM CURRENT USE OF INSULIN, UNSPECIFIED LATERALITY (HCC): ICD-10-CM

## 2024-03-18 RX ORDER — METOPROLOL SUCCINATE 25 MG/1
25 TABLET, EXTENDED RELEASE ORAL DAILY
Qty: 90 TABLET | Refills: 1 | Status: SHIPPED | OUTPATIENT
Start: 2024-03-18

## 2024-03-18 RX ORDER — BLOOD-GLUCOSE METER
1 KIT MISCELLANEOUS DAILY
Qty: 1 KIT | Refills: 0 | Status: SHIPPED | OUTPATIENT
Start: 2024-03-18

## 2024-03-18 RX ORDER — SILDENAFIL 100 MG/1
100 TABLET, FILM COATED ORAL PRN
Qty: 20 TABLET | Refills: 5 | OUTPATIENT
Start: 2024-03-18

## 2024-03-18 RX ORDER — LIDOCAINE 50 MG/G
PATCH TOPICAL
Qty: 30 PATCH | Refills: 2 | Status: SHIPPED | OUTPATIENT
Start: 2024-03-18

## 2024-03-18 RX ORDER — ATORVASTATIN CALCIUM 20 MG/1
20 TABLET, FILM COATED ORAL DAILY
Qty: 90 TABLET | Refills: 0 | Status: SHIPPED | OUTPATIENT
Start: 2024-03-18

## 2024-03-18 RX ORDER — OMEPRAZOLE 20 MG/1
20 CAPSULE, DELAYED RELEASE ORAL DAILY
Qty: 90 CAPSULE | Refills: 2 | Status: SHIPPED | OUTPATIENT
Start: 2024-03-18

## 2024-03-18 RX ORDER — ASPIRIN 81 MG/1
81 TABLET, CHEWABLE ORAL DAILY
Qty: 90 TABLET | Refills: 1 | Status: SHIPPED | OUTPATIENT
Start: 2024-03-18

## 2024-03-18 RX ORDER — SEMAGLUTIDE 0.68 MG/ML
INJECTION, SOLUTION SUBCUTANEOUS
Qty: 3 ML | Refills: 0 | Status: SHIPPED | OUTPATIENT
Start: 2024-03-18

## 2024-03-18 RX ORDER — LISINOPRIL 40 MG/1
40 TABLET ORAL DAILY
Qty: 90 TABLET | Refills: 1 | Status: SHIPPED | OUTPATIENT
Start: 2024-03-18

## 2024-03-18 RX ORDER — EPINEPHRINE 0.3 MG/.3ML
0.3 INJECTION SUBCUTANEOUS ONCE
Qty: 1 EACH | Refills: 0 | Status: SHIPPED | OUTPATIENT
Start: 2024-03-18 | End: 2024-03-18

## 2024-03-18 RX ORDER — FLUTICASONE PROPIONATE AND SALMETEROL 250; 50 UG/1; UG/1
1 POWDER RESPIRATORY (INHALATION) EVERY 12 HOURS
Qty: 60 EACH | Refills: 3 | Status: SHIPPED | OUTPATIENT
Start: 2024-03-18

## 2024-03-18 RX ORDER — ALBUTEROL SULFATE 90 UG/1
AEROSOL, METERED RESPIRATORY (INHALATION)
Qty: 18 G | Refills: 2 | Status: SHIPPED | OUTPATIENT
Start: 2024-03-18

## 2024-04-18 ENCOUNTER — NURSE ONLY (OUTPATIENT)
Facility: CLINIC | Age: 54
End: 2024-04-18

## 2024-04-18 DIAGNOSIS — J45.40 MODERATE PERSISTENT ASTHMA WITHOUT COMPLICATION: ICD-10-CM

## 2024-04-18 DIAGNOSIS — E78.5 HYPERLIPIDEMIA, UNSPECIFIED HYPERLIPIDEMIA TYPE: ICD-10-CM

## 2024-04-18 DIAGNOSIS — E11.319 TYPE 2 DIABETES MELLITUS WITH RETINOPATHY, WITHOUT LONG-TERM CURRENT USE OF INSULIN, MACULAR EDEMA PRESENCE UNSPECIFIED, UNSPECIFIED LATERALITY, UNSPECIFIED RETINOPATHY SEVERITY (HCC): Primary | ICD-10-CM

## 2024-04-19 LAB
ALBUMIN SERPL-MCNC: 4.3 G/DL (ref 3.8–4.9)
ALBUMIN/GLOB SERPL: 2.2 {RATIO} (ref 1.2–2.2)
ALP SERPL-CCNC: 86 IU/L (ref 44–121)
ALT SERPL-CCNC: 33 IU/L (ref 0–44)
AST SERPL-CCNC: 28 IU/L (ref 0–40)
BASOPHILS # BLD AUTO: 0.1 X10E3/UL (ref 0–0.2)
BASOPHILS NFR BLD AUTO: 1 %
BILIRUB SERPL-MCNC: 0.4 MG/DL (ref 0–1.2)
BUN SERPL-MCNC: 12 MG/DL (ref 6–24)
BUN/CREAT SERPL: 10 (ref 9–20)
CALCIUM SERPL-MCNC: 9.1 MG/DL (ref 8.7–10.2)
CHLORIDE SERPL-SCNC: 105 MMOL/L (ref 96–106)
CHOLEST SERPL-MCNC: 108 MG/DL (ref 100–199)
CO2 SERPL-SCNC: 22 MMOL/L (ref 20–29)
CREAT SERPL-MCNC: 1.16 MG/DL (ref 0.76–1.27)
EGFRCR SERPLBLD CKD-EPI 2021: 74 ML/MIN/1.73
EOSINOPHIL # BLD AUTO: 0.3 X10E3/UL (ref 0–0.4)
EOSINOPHIL NFR BLD AUTO: 7 %
ERYTHROCYTE [DISTWIDTH] IN BLOOD BY AUTOMATED COUNT: 13 % (ref 11.6–15.4)
GLOBULIN SER CALC-MCNC: 2 G/DL (ref 1.5–4.5)
GLUCOSE SERPL-MCNC: 129 MG/DL (ref 70–99)
HBA1C MFR BLD: 6.8 % (ref 4.8–5.6)
HCT VFR BLD AUTO: 41.3 % (ref 37.5–51)
HDLC SERPL-MCNC: 33 MG/DL
HGB BLD-MCNC: 13.6 G/DL (ref 13–17.7)
IMM GRANULOCYTES # BLD AUTO: 0 X10E3/UL (ref 0–0.1)
IMM GRANULOCYTES NFR BLD AUTO: 0 %
IMP & REVIEW OF LAB RESULTS: NORMAL
LDLC SERPL CALC-MCNC: 34 MG/DL (ref 0–99)
LYMPHOCYTES # BLD AUTO: 1.7 X10E3/UL (ref 0.7–3.1)
LYMPHOCYTES NFR BLD AUTO: 36 %
Lab: NORMAL
MCH RBC QN AUTO: 27.9 PG (ref 26.6–33)
MCHC RBC AUTO-ENTMCNC: 32.9 G/DL (ref 31.5–35.7)
MCV RBC AUTO: 85 FL (ref 79–97)
MONOCYTES # BLD AUTO: 0.4 X10E3/UL (ref 0.1–0.9)
MONOCYTES NFR BLD AUTO: 8 %
NEUTROPHILS # BLD AUTO: 2.3 X10E3/UL (ref 1.4–7)
NEUTROPHILS NFR BLD AUTO: 48 %
PLATELET # BLD AUTO: 313 X10E3/UL (ref 150–450)
POTASSIUM SERPL-SCNC: 3.8 MMOL/L (ref 3.5–5.2)
PROT SERPL-MCNC: 6.3 G/DL (ref 6–8.5)
RBC # BLD AUTO: 4.88 X10E6/UL (ref 4.14–5.8)
SODIUM SERPL-SCNC: 143 MMOL/L (ref 134–144)
SPECIMEN STATUS REPORT: NORMAL
TRIGL SERPL-MCNC: 271 MG/DL (ref 0–149)
VLDLC SERPL CALC-MCNC: 41 MG/DL (ref 5–40)
WBC # BLD AUTO: 4.7 X10E3/UL (ref 3.4–10.8)

## 2024-05-15 DIAGNOSIS — E11.319 TYPE 2 DIABETES MELLITUS WITH RETINOPATHY, WITHOUT LONG-TERM CURRENT USE OF INSULIN, MACULAR EDEMA PRESENCE UNSPECIFIED, UNSPECIFIED LATERALITY, UNSPECIFIED RETINOPATHY SEVERITY (HCC): Primary | ICD-10-CM

## 2024-05-15 RX ORDER — LANCETS 28 GAUGE
1 EACH MISCELLANEOUS DAILY
Qty: 100 EACH | Refills: 3 | Status: SHIPPED | OUTPATIENT
Start: 2024-05-15

## 2024-05-15 RX ORDER — BLOOD SUGAR DIAGNOSTIC
1 STRIP MISCELLANEOUS DAILY
Qty: 100 EACH | Refills: 3 | Status: SHIPPED | OUTPATIENT
Start: 2024-05-15

## 2024-06-25 DIAGNOSIS — E11.3299 TYPE 2 DIABETES MELLITUS WITH MILD NONPROLIFERATIVE RETINOPATHY WITHOUT MACULAR EDEMA, WITHOUT LONG-TERM CURRENT USE OF INSULIN, UNSPECIFIED LATERALITY (HCC): ICD-10-CM

## 2024-06-25 RX ORDER — SEMAGLUTIDE 0.68 MG/ML
0.5 INJECTION, SOLUTION SUBCUTANEOUS WEEKLY
Qty: 3 ML | Refills: 0 | Status: SHIPPED | OUTPATIENT
Start: 2024-06-25

## 2024-07-01 ENCOUNTER — OFFICE VISIT (OUTPATIENT)
Facility: CLINIC | Age: 54
End: 2024-07-01
Payer: MEDICARE

## 2024-07-01 VITALS
HEART RATE: 64 BPM | BODY MASS INDEX: 32.88 KG/M2 | DIASTOLIC BLOOD PRESSURE: 91 MMHG | WEIGHT: 222 LBS | RESPIRATION RATE: 16 BRPM | OXYGEN SATURATION: 94 % | TEMPERATURE: 98.3 F | HEIGHT: 69 IN | SYSTOLIC BLOOD PRESSURE: 138 MMHG

## 2024-07-01 DIAGNOSIS — Z86.010 HISTORY OF COLONIC POLYPS: ICD-10-CM

## 2024-07-01 DIAGNOSIS — H54.8 LEGALLY BLIND IN RIGHT EYE, AS DEFINED IN USA: ICD-10-CM

## 2024-07-01 DIAGNOSIS — F20.9 SCHIZOPHRENIA, UNSPECIFIED TYPE (HCC): ICD-10-CM

## 2024-07-01 DIAGNOSIS — J45.20 MILD INTERMITTENT ASTHMA WITHOUT COMPLICATION: ICD-10-CM

## 2024-07-01 DIAGNOSIS — E11.3299 TYPE 2 DIABETES MELLITUS WITH MILD NONPROLIFERATIVE RETINOPATHY WITHOUT MACULAR EDEMA, WITHOUT LONG-TERM CURRENT USE OF INSULIN, UNSPECIFIED LATERALITY (HCC): Primary | ICD-10-CM

## 2024-07-01 DIAGNOSIS — G47.30 SLEEP APNEA, UNSPECIFIED TYPE: ICD-10-CM

## 2024-07-01 DIAGNOSIS — E78.5 DYSLIPIDEMIA: ICD-10-CM

## 2024-07-01 DIAGNOSIS — D12.6 BENIGN NEOPLASM OF COLON, UNSPECIFIED PART OF COLON: ICD-10-CM

## 2024-07-01 DIAGNOSIS — Z23 NEED FOR PNEUMOCOCCAL 20-VALENT CONJUGATE VACCINATION: ICD-10-CM

## 2024-07-01 DIAGNOSIS — Z91.013 SHELLFISH ALLERGY: ICD-10-CM

## 2024-07-01 DIAGNOSIS — I10 PRIMARY HYPERTENSION: ICD-10-CM

## 2024-07-01 DIAGNOSIS — C61 MALIGNANT NEOPLASM OF PROSTATE (HCC): ICD-10-CM

## 2024-07-01 DIAGNOSIS — I25.10 ATHEROSCLEROSIS OF CORONARY ARTERY WITHOUT ANGINA PECTORIS, UNSPECIFIED VESSEL OR LESION TYPE, UNSPECIFIED WHETHER NATIVE OR TRANSPLANTED HEART: ICD-10-CM

## 2024-07-01 DIAGNOSIS — D17.0 LIPOMA OF NECK: ICD-10-CM

## 2024-07-01 DIAGNOSIS — Z12.11 SCREENING FOR COLON CANCER: ICD-10-CM

## 2024-07-01 DIAGNOSIS — I25.2 HISTORY OF MYOCARDIAL INFARCTION: ICD-10-CM

## 2024-07-01 PROBLEM — E66.01 MORBID OBESITY (HCC): Status: RESOLVED | Noted: 2021-08-06 | Resolved: 2024-07-01

## 2024-07-01 PROBLEM — R51.9 HEADACHE: Status: RESOLVED | Noted: 2021-08-06 | Resolved: 2024-07-01

## 2024-07-01 PROBLEM — J30.9 ATOPIC RHINITIS: Status: RESOLVED | Noted: 2021-08-06 | Resolved: 2024-07-01

## 2024-07-01 PROBLEM — J45.40 MODERATE PERSISTENT ASTHMA: Status: RESOLVED | Noted: 2021-08-06 | Resolved: 2024-07-01

## 2024-07-01 PROBLEM — I11.9 HYPERTENSIVE HEART DISEASE WITHOUT HEART FAILURE: Status: RESOLVED | Noted: 2018-07-09 | Resolved: 2024-07-01

## 2024-07-01 PROBLEM — M25.569 KNEE PAIN: Status: RESOLVED | Noted: 2021-08-06 | Resolved: 2024-07-01

## 2024-07-01 PROBLEM — Z71.89 ADVANCE DIRECTIVE DISCUSSED WITH PATIENT: Status: RESOLVED | Noted: 2017-05-03 | Resolved: 2024-07-01

## 2024-07-01 PROBLEM — G47.10 HYPERSOMNIA WITH SLEEP APNEA: Status: RESOLVED | Noted: 2021-08-06 | Resolved: 2024-07-01

## 2024-07-01 PROBLEM — R94.31 ABNORMAL EKG: Status: RESOLVED | Noted: 2018-07-09 | Resolved: 2024-07-01

## 2024-07-01 PROBLEM — E11.9 TYPE 2 DIABETES MELLITUS WITHOUT COMPLICATION, WITHOUT LONG-TERM CURRENT USE OF INSULIN (HCC): Status: RESOLVED | Noted: 2018-07-09 | Resolved: 2024-07-01

## 2024-07-01 PROCEDURE — 90677 PCV20 VACCINE IM: CPT | Performed by: NURSE PRACTITIONER

## 2024-07-01 PROCEDURE — G0009 ADMIN PNEUMOCOCCAL VACCINE: HCPCS | Performed by: NURSE PRACTITIONER

## 2024-07-01 PROCEDURE — 99214 OFFICE O/P EST MOD 30 MIN: CPT | Performed by: NURSE PRACTITIONER

## 2024-07-01 PROCEDURE — 3044F HG A1C LEVEL LT 7.0%: CPT | Performed by: NURSE PRACTITIONER

## 2024-07-01 PROCEDURE — 3075F SYST BP GE 130 - 139MM HG: CPT | Performed by: NURSE PRACTITIONER

## 2024-07-01 PROCEDURE — 3079F DIAST BP 80-89 MM HG: CPT | Performed by: NURSE PRACTITIONER

## 2024-07-01 NOTE — ASSESSMENT & PLAN NOTE
Monitored by specialist- no acute findings meriting change in the plan  Seeing urology every 3 months; recent PSA 1.0

## 2024-07-01 NOTE — PROGRESS NOTES
\"Have you been to the ER, urgent care clinic since your last visit?  Hospitalized since your last visit?\"    NO    “Have you seen or consulted any other health care providers outside of [facility] since your last visit?”    Yes- Opthalmology      “Have you had a colorectal cancer screening such as a colonoscopy/FIT/Cologuard?    NO    Date of last Colonoscopy: 3/5/2019  No cologuard on file  No FIT/FOBT on file   No flexible sigmoidoscopy on file         Click Here for Release of Records Request      Patient was given VIS for review, consent was obtained and per orders of Shannan Maldonado NP, injection of Prevnar 20 given by Latoya Cartwright LPN. Patient observed. No signs nor symptoms of any adverse reactions.Patient tolerated injection well.    
in the plan  Last saw cardiology 2023; has yet to schedule follow-up with Dr. Rowe  On baby Aspirin 81 mg; denies chest pain  16. Benign neoplasm of colon, unspecified part of colon         Follow up and disposition:   Return in about 3 months (around 10/1/2024) for diabetes.      Subjective:    Labs obtained prior to visit? No  Reviewed with patient? N/A    Refuses surgical referral for lipoma on neck     Patient abdominal cramping for 1 day; denies blood in stool or vomiting/diarrhea    Urology every 3 months, ophthalmology every 6 months    ROS:     Review of Systems   Constitutional:  Negative for chills, fatigue and fever.   HENT: Negative.     Respiratory:  Negative for chest tightness, shortness of breath and wheezing.    Cardiovascular:  Negative for chest pain, palpitations and leg swelling.   Gastrointestinal: Negative.  Negative for abdominal pain, blood in stool and diarrhea.   Genitourinary: Negative.  Negative for difficulty urinating and hematuria.   Musculoskeletal:  Negative for arthralgias and back pain.   Neurological:  Negative for dizziness and light-headedness.   Psychiatric/Behavioral: Negative.  Negative for confusion, decreased concentration, dysphoric mood, self-injury, sleep disturbance and suicidal ideas. The patient is not nervous/anxious.          The problem list was updated as a part of today's visit.  Patient Active Problem List   Diagnosis    Dyslipidemia    Legal blindness, as defined in [place]    Type 2 diabetes mellitus with retinopathy, without long-term current use of insulin (HCC)    Renal insufficiency, mild    Mild intermittent asthma    History of colonic polyps    GERD (gastroesophageal reflux disease)    History of myocardial infarction    Schizophrenia (HCC)    Primary osteoarthritis of right knee    Hypertension    Hypertensive left ventricular hypertrophy, without heart failure    Atherosclerosis of coronary artery without angina pectoris    Benign

## 2024-07-12 LAB — HBA1C MFR BLD: 6.5 % (ref 4.8–5.6)

## 2024-07-13 PROBLEM — F41.9 ANXIETY: Status: RESOLVED | Noted: 2021-08-06 | Resolved: 2024-07-13

## 2024-07-13 PROBLEM — M17.32 POST-TRAUMATIC OSTEOARTHRITIS OF LEFT KNEE: Status: RESOLVED | Noted: 2018-07-12 | Resolved: 2024-07-13

## 2024-07-13 ASSESSMENT — ENCOUNTER SYMPTOMS
SHORTNESS OF BREATH: 0
CHEST TIGHTNESS: 0
ABDOMINAL PAIN: 0
WHEEZING: 0
BACK PAIN: 0
GASTROINTESTINAL NEGATIVE: 1
BLOOD IN STOOL: 0
DIARRHEA: 0

## 2024-07-13 NOTE — ASSESSMENT & PLAN NOTE
Monitored by specialist- no acute findings meriting change in the plan  Last saw cardiology 2023; has yet to schedule follow-up with Dr. Rowe  On baby Aspirin 81 mg; denies chest pain

## 2024-07-13 NOTE — ASSESSMENT & PLAN NOTE
patient states stopped taking psychiatric medications; no longer seeing psychiatric. Denies SI/HI

## 2024-07-22 DIAGNOSIS — E11.3299 TYPE 2 DIABETES MELLITUS WITH MILD NONPROLIFERATIVE RETINOPATHY WITHOUT MACULAR EDEMA, WITHOUT LONG-TERM CURRENT USE OF INSULIN, UNSPECIFIED LATERALITY (HCC): Primary | ICD-10-CM

## 2024-07-25 ENCOUNTER — TELEPHONE (OUTPATIENT)
Facility: CLINIC | Age: 54
End: 2024-07-25

## 2024-07-25 NOTE — TELEPHONE ENCOUNTER
Called patient back and provided patient again with the number for [facility] to schedule his survellience colonoscopy. Pt's last colonoscopy was 3/2019 and needed 5 year follow-up--3/2024. Patient will call today to schedule colonoscopy appt. Advised pt if bleeding in stool returns, to go to ER ASAP.   Patient daughter would like call back, stated patient is competent and able to sign his name

## 2024-07-25 NOTE — TELEPHONE ENCOUNTER
----- Message from Latoya Cartwright LPN sent at 7/25/2024  7:59 AM EDT -----  Regarding: FW: Blood in my stool  Contact: [phone]    ----- Message -----  From: Luis Enrique David  Sent: 7/24/2024   8:26 PM EDT  To: [facility] Clinical Staff  Subject: Blood in my stool                                Hi Mrs. Van I just wanted to let you know that I had blood in my stool  on 7/20/24 but I haven’t seen any since .

## 2024-08-04 DIAGNOSIS — E11.3299 TYPE 2 DIABETES MELLITUS WITH MILD NONPROLIFERATIVE RETINOPATHY WITHOUT MACULAR EDEMA, WITHOUT LONG-TERM CURRENT USE OF INSULIN, UNSPECIFIED LATERALITY (HCC): ICD-10-CM

## 2024-08-05 ENCOUNTER — TELEPHONE (OUTPATIENT)
Facility: CLINIC | Age: 54
End: 2024-08-05

## 2024-08-05 DIAGNOSIS — E11.3299 TYPE 2 DIABETES MELLITUS WITH MILD NONPROLIFERATIVE RETINOPATHY WITHOUT MACULAR EDEMA, WITHOUT LONG-TERM CURRENT USE OF INSULIN, UNSPECIFIED LATERALITY (HCC): Primary | ICD-10-CM

## 2024-08-05 RX ORDER — SEMAGLUTIDE 0.68 MG/ML
INJECTION, SOLUTION SUBCUTANEOUS
Qty: 3 ML | Refills: 0 | OUTPATIENT
Start: 2024-08-05

## 2024-08-05 RX ORDER — SEMAGLUTIDE 2.68 MG/ML
2 INJECTION, SOLUTION SUBCUTANEOUS
Qty: 3 ML | Refills: 0 | Status: SHIPPED | OUTPATIENT
Start: 2024-08-05

## 2024-08-05 NOTE — TELEPHONE ENCOUNTER
Patient stated that the pharmacy informed him that his Ozempic is suppose to be the 2mg instead of the 1mg.

## 2024-08-29 RX ORDER — ATORVASTATIN CALCIUM 20 MG/1
20 TABLET, FILM COATED ORAL DAILY
Qty: 90 TABLET | Refills: 0 | Status: SHIPPED | OUTPATIENT
Start: 2024-08-29 | End: 2024-08-31 | Stop reason: SDUPTHER

## 2024-08-31 DIAGNOSIS — E11.3299 TYPE 2 DIABETES MELLITUS WITH MILD NONPROLIFERATIVE RETINOPATHY WITHOUT MACULAR EDEMA, WITHOUT LONG-TERM CURRENT USE OF INSULIN, UNSPECIFIED LATERALITY (HCC): ICD-10-CM

## 2024-08-31 RX ORDER — ATORVASTATIN CALCIUM 20 MG/1
20 TABLET, FILM COATED ORAL DAILY
Qty: 90 TABLET | Refills: 0 | OUTPATIENT
Start: 2024-08-31

## 2024-08-31 RX ORDER — LIDOCAINE 50 MG/G
PATCH TOPICAL
Qty: 30 PATCH | Refills: 2 | Status: SHIPPED | OUTPATIENT
Start: 2024-08-31

## 2024-08-31 RX ORDER — ATORVASTATIN CALCIUM 20 MG/1
20 TABLET, FILM COATED ORAL DAILY
Qty: 90 TABLET | Refills: 0 | Status: SHIPPED | OUTPATIENT
Start: 2024-08-31

## 2024-08-31 RX ORDER — SEMAGLUTIDE 2.68 MG/ML
2 INJECTION, SOLUTION SUBCUTANEOUS
Qty: 3 ML | Refills: 0 | Status: SHIPPED | OUTPATIENT
Start: 2024-08-31

## 2024-09-23 DIAGNOSIS — E11.3299 TYPE 2 DIABETES MELLITUS WITH MILD NONPROLIFERATIVE RETINOPATHY WITHOUT MACULAR EDEMA, WITHOUT LONG-TERM CURRENT USE OF INSULIN, UNSPECIFIED LATERALITY (HCC): ICD-10-CM

## 2024-09-23 RX ORDER — SEMAGLUTIDE 2.68 MG/ML
INJECTION, SOLUTION SUBCUTANEOUS
Qty: 3 ML | Refills: 0 | Status: SHIPPED | OUTPATIENT
Start: 2024-09-23 | End: 2024-09-29 | Stop reason: SDUPTHER

## 2024-09-29 DIAGNOSIS — E11.3299 TYPE 2 DIABETES MELLITUS WITH MILD NONPROLIFERATIVE RETINOPATHY WITHOUT MACULAR EDEMA, WITHOUT LONG-TERM CURRENT USE OF INSULIN, UNSPECIFIED LATERALITY (HCC): ICD-10-CM

## 2024-09-29 RX ORDER — SEMAGLUTIDE 2.68 MG/ML
2 INJECTION, SOLUTION SUBCUTANEOUS
Qty: 3 ML | Refills: 0 | Status: SHIPPED | OUTPATIENT
Start: 2024-09-29

## 2024-10-01 ENCOUNTER — OFFICE VISIT (OUTPATIENT)
Facility: CLINIC | Age: 54
End: 2024-10-01

## 2024-10-01 VITALS
OXYGEN SATURATION: 94 % | TEMPERATURE: 98.2 F | RESPIRATION RATE: 16 BRPM | DIASTOLIC BLOOD PRESSURE: 80 MMHG | BODY MASS INDEX: 31.73 KG/M2 | WEIGHT: 214.2 LBS | HEIGHT: 69 IN | HEART RATE: 74 BPM | SYSTOLIC BLOOD PRESSURE: 132 MMHG

## 2024-10-01 DIAGNOSIS — F20.9 SCHIZOPHRENIA, UNSPECIFIED TYPE (HCC): ICD-10-CM

## 2024-10-01 DIAGNOSIS — H54.8 LEGALLY BLIND IN RIGHT EYE, AS DEFINED IN USA: ICD-10-CM

## 2024-10-01 DIAGNOSIS — E11.3299 TYPE 2 DIABETES MELLITUS WITH MILD NONPROLIFERATIVE RETINOPATHY WITHOUT MACULAR EDEMA, WITHOUT LONG-TERM CURRENT USE OF INSULIN, UNSPECIFIED LATERALITY (HCC): ICD-10-CM

## 2024-10-01 DIAGNOSIS — C61 MALIGNANT NEOPLASM OF PROSTATE (HCC): ICD-10-CM

## 2024-10-01 DIAGNOSIS — Z23 ENCOUNTER FOR IMMUNIZATION: Primary | ICD-10-CM

## 2024-10-01 DIAGNOSIS — I10 PRIMARY HYPERTENSION: ICD-10-CM

## 2024-10-01 DIAGNOSIS — M17.11 PRIMARY OSTEOARTHRITIS OF RIGHT KNEE: ICD-10-CM

## 2024-10-01 ASSESSMENT — PATIENT HEALTH QUESTIONNAIRE - PHQ9
SUM OF ALL RESPONSES TO PHQ QUESTIONS 1-9: 0
1. LITTLE INTEREST OR PLEASURE IN DOING THINGS: NOT AT ALL
SUM OF ALL RESPONSES TO PHQ QUESTIONS 1-9: 0
SUM OF ALL RESPONSES TO PHQ9 QUESTIONS 1 & 2: 0
SUM OF ALL RESPONSES TO PHQ QUESTIONS 1-9: 0
2. FEELING DOWN, DEPRESSED OR HOPELESS: NOT AT ALL
SUM OF ALL RESPONSES TO PHQ QUESTIONS 1-9: 0

## 2024-10-01 NOTE — PROGRESS NOTES
Patient did not take medication today.    Luis Enrique David presents today for   Chief Complaint   Patient presents with    Follow-up    Diabetes       Is someone accompanying this pt? no    Is the patient using any DME equipment during OV? no    Depression Screening:       No data to display                Learning Assessment:  Failed to redirect to the Timeline version of the REVFS SmartLink.    Health Maintenance reviewed and discussed and ordered per Provider.    Health Maintenance Due   Topic Date Due    COVID-19 Vaccine (3 - 2023-24 season) 09/01/2024   .    \"Have you been to the ER, urgent care clinic since your last visit?  Hospitalized since your last visit?\"    no    “Have you seen or consulted any other health care providers outside our system since your last visit?”    no           Patient was given VIS for review, consent was obtained and per orders of NP.Shannan Maldonado , injection of Flu vaccine  given by Declan Morgan [facility]. Patient observed. No signs nor symptoms of any adverse reactions.Patient tolerated injection well.

## 2024-10-01 NOTE — PROGRESS NOTES
[address] [place] [address]               [phone]      Luis Enrique David is a 54 y.o. male and presents with Follow-up and Diabetes       Assessment/Plan:    1. Encounter for immunization  -     Influenza, FLUCELVAX Trivalent, (age 6 mo+) IM, Preservative Free, 0.5mL  2. Type 2 diabetes mellitus with mild nonproliferative retinopathy without macular edema, without long-term current use of insulin, unspecified laterality (HCC)  3. Schizophrenia, unspecified type (HCC)  Assessment & Plan:     patient states stopped taking psychiatric medications; no longer seeing psychiatric. Denies SI/HI      4. Legally blind in right eye, as defined in [place]  5. Primary osteoarthritis of right knee  Assessment & Plan:   Chronic, at goal (stable), continue current treatment plan  Orders:  -     diclofenac sodium (VOLTAREN) 1 % GEL; Apply topically 4 times daily, Topical, 4 TIMES DAILY Starting Tue 10/1/2024, Disp-100 g, R-1, Normal  6. Primary hypertension  Assessment & Plan:   Chronic, at goal (stable), continue current treatment plan, medication adherence emphasized, and lifestyle modifications recommended  7. Malignant neoplasm of prostate (HCC)  Assessment & Plan:     Monitored by specialist- no acute findings meriting change in the plan  Seeing urology every 5 months; recent PSA 1.0            Follow up and disposition:   Return in about 3 months (around 1/1/2025).      Subjective:    Labs obtained prior to visit? No  Reviewed with patient? N/A    Patient about to celebrate 1 year wedding anniversary 12/2024      ROS:     Review of Systems   Constitutional:  Negative for chills, fatigue and fever.   HENT: Negative.     Respiratory:  Negative for chest tightness, shortness of breath and wheezing.    Cardiovascular:  Negative for chest pain, palpitations and leg swelling.   Gastrointestinal: Negative.  Negative for abdominal pain, blood in stool and diarrhea.   Genitourinary: Negative.  Negative

## 2024-10-02 ASSESSMENT — ENCOUNTER SYMPTOMS
WHEEZING: 0
SHORTNESS OF BREATH: 0
ABDOMINAL PAIN: 0
BACK PAIN: 0
GASTROINTESTINAL NEGATIVE: 1
BLOOD IN STOOL: 0
CHEST TIGHTNESS: 0
DIARRHEA: 0

## 2024-10-02 NOTE — ASSESSMENT & PLAN NOTE
Monitored by specialist- no acute findings meriting change in the plan  Seeing urology every 5 months; recent PSA 1.0

## 2024-10-02 NOTE — ASSESSMENT & PLAN NOTE
patient states stopped taking psychiatric medications; no longer seeing psychiatric. Denies SI/HI

## 2024-10-20 DIAGNOSIS — E11.3299 TYPE 2 DIABETES MELLITUS WITH MILD NONPROLIFERATIVE RETINOPATHY WITHOUT MACULAR EDEMA, WITHOUT LONG-TERM CURRENT USE OF INSULIN, UNSPECIFIED LATERALITY (HCC): ICD-10-CM

## 2024-10-20 RX ORDER — SEMAGLUTIDE 2.68 MG/ML
INJECTION, SOLUTION SUBCUTANEOUS
Qty: 3 ML | Refills: 0 | Status: SHIPPED | OUTPATIENT
Start: 2024-10-20

## 2024-11-06 DIAGNOSIS — G89.29 CHRONIC PAIN OF LEFT KNEE: Primary | ICD-10-CM

## 2024-11-06 DIAGNOSIS — M25.562 CHRONIC PAIN OF LEFT KNEE: Primary | ICD-10-CM

## 2024-11-06 DIAGNOSIS — Z96.652 HISTORY OF LEFT KNEE REPLACEMENT: ICD-10-CM

## 2024-11-26 ENCOUNTER — OFFICE VISIT (OUTPATIENT)
Facility: CLINIC | Age: 54
End: 2024-11-26
Payer: COMMERCIAL

## 2024-11-26 ENCOUNTER — HOSPITAL ENCOUNTER (OUTPATIENT)
Facility: HOSPITAL | Age: 54
Setting detail: SPECIMEN
Discharge: HOME | End: 2024-11-29
Payer: COMMERCIAL

## 2024-11-26 VITALS
TEMPERATURE: 98.1 F | OXYGEN SATURATION: 93 % | HEART RATE: 77 BPM | HEIGHT: 69 IN | DIASTOLIC BLOOD PRESSURE: 75 MMHG | SYSTOLIC BLOOD PRESSURE: 113 MMHG | BODY MASS INDEX: 31.16 KG/M2 | WEIGHT: 210.4 LBS | RESPIRATION RATE: 16 BRPM

## 2024-11-26 DIAGNOSIS — C61 MALIGNANT NEOPLASM OF PROSTATE (HCC): ICD-10-CM

## 2024-11-26 DIAGNOSIS — E11.3299 TYPE 2 DIABETES MELLITUS WITH MILD NONPROLIFERATIVE RETINOPATHY WITHOUT MACULAR EDEMA, WITHOUT LONG-TERM CURRENT USE OF INSULIN, UNSPECIFIED LATERALITY (HCC): ICD-10-CM

## 2024-11-26 DIAGNOSIS — D17.0 LIPOMA OF NECK: Primary | ICD-10-CM

## 2024-11-26 DIAGNOSIS — D17.22 LIPOMA OF LEFT FOREARM: ICD-10-CM

## 2024-11-26 DIAGNOSIS — I10 PRIMARY HYPERTENSION: ICD-10-CM

## 2024-11-26 LAB
EST. AVERAGE GLUCOSE BLD GHB EST-MCNC: 123 MG/DL
HBA1C MFR BLD: 5.9 % (ref 4.2–5.6)

## 2024-11-26 PROCEDURE — 3074F SYST BP LT 130 MM HG: CPT | Performed by: NURSE PRACTITIONER

## 2024-11-26 PROCEDURE — 3078F DIAST BP <80 MM HG: CPT | Performed by: NURSE PRACTITIONER

## 2024-11-26 PROCEDURE — 36415 COLL VENOUS BLD VENIPUNCTURE: CPT

## 2024-11-26 PROCEDURE — 83036 HEMOGLOBIN GLYCOSYLATED A1C: CPT

## 2024-11-26 PROCEDURE — 3044F HG A1C LEVEL LT 7.0%: CPT | Performed by: NURSE PRACTITIONER

## 2024-11-26 PROCEDURE — 99212 OFFICE O/P EST SF 10 MIN: CPT | Performed by: NURSE PRACTITIONER

## 2024-11-26 ASSESSMENT — PATIENT HEALTH QUESTIONNAIRE - PHQ9
SUM OF ALL RESPONSES TO PHQ9 QUESTIONS 1 & 2: 0
SUM OF ALL RESPONSES TO PHQ QUESTIONS 1-9: 0
1. LITTLE INTEREST OR PLEASURE IN DOING THINGS: NOT AT ALL
SUM OF ALL RESPONSES TO PHQ QUESTIONS 1-9: 0
2. FEELING DOWN, DEPRESSED OR HOPELESS: NOT AT ALL
SUM OF ALL RESPONSES TO PHQ QUESTIONS 1-9: 0
SUM OF ALL RESPONSES TO PHQ QUESTIONS 1-9: 0

## 2024-11-26 NOTE — PROGRESS NOTES
[address] [place] [address]               [phone]      Luis Enrique David is a 54 y.o. male and presents with Anticoagulation and Arm Pain (Left, 3 weeks )       Assessment/Plan:    1. Lipoma of neck  Assessment & Plan:   Refuses surgery.  2. Type 2 diabetes mellitus with mild nonproliferative retinopathy without macular edema, without long-term current use of insulin, unspecified laterality (HCC)  Assessment & Plan:   Chronic, at goal (stable), continue current treatment plan  Orders:  -     Hemoglobin A1C; Future  3. Lipoma of left forearm  Assessment & Plan:    Refuses surgical referral.   4. Malignant neoplasm of prostate (HCC)  Assessment & Plan:        Monitored by specialist- no acute findings meriting change in the plan  Seeing urology every 5 months; recent PSA 1.0        5. Primary hypertension  Assessment & Plan:   Chronic, at goal (stable), continue current treatment plan, medication adherence emphasized, and lifestyle modifications recommended         Follow up and disposition:   Return in about 3 months (around 2/26/2025).      Subjective:    Labs obtained prior to visit? No  Reviewed with patient? N/A    Patient noticed a lump to his left forearm about 2-3 weeks ago; denies tenderness     ROS:     Review of Systems   Constitutional:  Negative for chills, fatigue and fever.   HENT: Negative.     Respiratory:  Negative for chest tightness, shortness of breath and wheezing.    Cardiovascular:  Negative for chest pain, palpitations and leg swelling.   Gastrointestinal: Negative.  Negative for abdominal pain, blood in stool and diarrhea.   Genitourinary: Negative.  Negative for difficulty urinating and hematuria.   Musculoskeletal:  Positive for arthralgias. Negative for back pain.   Skin:         Lump to left forearm   Neurological:  Negative for dizziness and light-headedness.   Psychiatric/Behavioral: Negative.  Negative for confusion, decreased concentration,

## 2024-11-26 NOTE — PROGRESS NOTES
Luis Enrique David presents today for   Chief Complaint   Patient presents with    Anticoagulation    Arm Pain     Left, 3 weeks        Is someone accompanying this pt? no    Is the patient using any DME equipment during OV? no    Depression Screening:       No data to display                Learning Assessment:  Failed to redirect to the Timeline version of the [employer] SmartLink.    Health Maintenance reviewed and discussed and ordered per Provider.    There are no preventive care reminders to display for this patient..          \"Have you been to the ER, urgent care clinic since your last visit?  Hospitalized since your last visit?\"    no    “Have you seen or consulted any other health care providers outside our system since your last visit?”    no

## 2024-11-30 ASSESSMENT — ENCOUNTER SYMPTOMS
SHORTNESS OF BREATH: 0
BACK PAIN: 0
BLOOD IN STOOL: 0
GASTROINTESTINAL NEGATIVE: 1
CHEST TIGHTNESS: 0
WHEEZING: 0
ABDOMINAL PAIN: 0
DIARRHEA: 0

## 2024-12-02 DIAGNOSIS — E11.3299 TYPE 2 DIABETES MELLITUS WITH MILD NONPROLIFERATIVE RETINOPATHY WITHOUT MACULAR EDEMA, WITHOUT LONG-TERM CURRENT USE OF INSULIN, UNSPECIFIED LATERALITY (HCC): ICD-10-CM

## 2024-12-03 RX ORDER — SEMAGLUTIDE 2.68 MG/ML
2 INJECTION, SOLUTION SUBCUTANEOUS
Qty: 3 ML | Refills: 0 | Status: SHIPPED | OUTPATIENT
Start: 2024-12-03

## 2025-01-14 RX ORDER — ATORVASTATIN CALCIUM 20 MG/1
20 TABLET, FILM COATED ORAL DAILY
Qty: 90 TABLET | Refills: 1 | Status: SHIPPED | OUTPATIENT
Start: 2025-01-14

## 2025-01-14 RX ORDER — METOPROLOL SUCCINATE 25 MG/1
25 TABLET, EXTENDED RELEASE ORAL DAILY
Qty: 90 TABLET | Refills: 1 | Status: SHIPPED | OUTPATIENT
Start: 2025-01-14

## 2025-01-14 RX ORDER — LISINOPRIL 40 MG/1
40 TABLET ORAL DAILY
Qty: 90 TABLET | Refills: 1 | Status: SHIPPED | OUTPATIENT
Start: 2025-01-14

## 2025-01-19 DIAGNOSIS — E11.3299 TYPE 2 DIABETES MELLITUS WITH MILD NONPROLIFERATIVE RETINOPATHY WITHOUT MACULAR EDEMA, WITHOUT LONG-TERM CURRENT USE OF INSULIN, UNSPECIFIED LATERALITY (HCC): ICD-10-CM

## 2025-01-21 RX ORDER — SEMAGLUTIDE 2.68 MG/ML
2 INJECTION, SOLUTION SUBCUTANEOUS
Qty: 3 ML | Refills: 0 | Status: SHIPPED | OUTPATIENT
Start: 2025-01-21

## 2025-02-05 DIAGNOSIS — E11.3299 TYPE 2 DIABETES MELLITUS WITH MILD NONPROLIFERATIVE RETINOPATHY WITHOUT MACULAR EDEMA, WITHOUT LONG-TERM CURRENT USE OF INSULIN, UNSPECIFIED LATERALITY: ICD-10-CM

## 2025-02-06 RX ORDER — SEMAGLUTIDE 2.68 MG/ML
2 INJECTION, SOLUTION SUBCUTANEOUS
Qty: 3 ML | Refills: 0 | OUTPATIENT
Start: 2025-02-06

## 2025-03-05 DIAGNOSIS — E11.3299 TYPE 2 DIABETES MELLITUS WITH MILD NONPROLIFERATIVE RETINOPATHY WITHOUT MACULAR EDEMA, WITHOUT LONG-TERM CURRENT USE OF INSULIN, UNSPECIFIED LATERALITY (HCC): ICD-10-CM

## 2025-03-05 RX ORDER — SEMAGLUTIDE 2.68 MG/ML
2 INJECTION, SOLUTION SUBCUTANEOUS
Qty: 3 ML | Refills: 0 | Status: SHIPPED | OUTPATIENT
Start: 2025-03-05

## 2025-05-09 DIAGNOSIS — E11.3299 TYPE 2 DIABETES MELLITUS WITH MILD NONPROLIFERATIVE RETINOPATHY WITHOUT MACULAR EDEMA, WITHOUT LONG-TERM CURRENT USE OF INSULIN, UNSPECIFIED LATERALITY (HCC): ICD-10-CM

## 2025-05-12 RX ORDER — SEMAGLUTIDE 2.68 MG/ML
2 INJECTION, SOLUTION SUBCUTANEOUS
Qty: 3 ML | Refills: 0 | Status: SHIPPED | OUTPATIENT
Start: 2025-05-12 | End: 2025-05-12

## 2025-06-02 DIAGNOSIS — E11.3299 TYPE 2 DIABETES MELLITUS WITH MILD NONPROLIFERATIVE RETINOPATHY WITHOUT MACULAR EDEMA, WITHOUT LONG-TERM CURRENT USE OF INSULIN, UNSPECIFIED LATERALITY (HCC): Primary | ICD-10-CM

## 2025-06-30 ENCOUNTER — TELEPHONE (OUTPATIENT)
Facility: CLINIC | Age: 55
End: 2025-06-30

## 2025-07-01 ENCOUNTER — TELEPHONE (OUTPATIENT)
Facility: CLINIC | Age: 55
End: 2025-07-01

## 2025-07-11 ENCOUNTER — OFFICE VISIT (OUTPATIENT)
Facility: CLINIC | Age: 55
End: 2025-07-11
Payer: COMMERCIAL

## 2025-07-11 ENCOUNTER — HOSPITAL ENCOUNTER (OUTPATIENT)
Facility: HOSPITAL | Age: 55
Setting detail: SPECIMEN
Discharge: HOME | End: 2025-07-14
Payer: COMMERCIAL

## 2025-07-11 VITALS
OXYGEN SATURATION: 95 % | WEIGHT: 204.8 LBS | SYSTOLIC BLOOD PRESSURE: 144 MMHG | BODY MASS INDEX: 30.33 KG/M2 | DIASTOLIC BLOOD PRESSURE: 93 MMHG | TEMPERATURE: 97.1 F | RESPIRATION RATE: 16 BRPM | HEIGHT: 69 IN | HEART RATE: 74 BPM

## 2025-07-11 DIAGNOSIS — M17.11 PRIMARY OSTEOARTHRITIS OF RIGHT KNEE: ICD-10-CM

## 2025-07-11 DIAGNOSIS — Z13.29 SCREENING FOR THYROID DISORDER: ICD-10-CM

## 2025-07-11 DIAGNOSIS — Z13.89 SCREENING FOR HEMATURIA OR PROTEINURIA: ICD-10-CM

## 2025-07-11 DIAGNOSIS — K21.9 GASTRO-ESOPHAGEAL REFLUX DISEASE WITHOUT ESOPHAGITIS: ICD-10-CM

## 2025-07-11 DIAGNOSIS — G47.30 SLEEP APNEA, UNSPECIFIED TYPE: ICD-10-CM

## 2025-07-11 DIAGNOSIS — Z13.0 SCREENING FOR DEFICIENCY ANEMIA: ICD-10-CM

## 2025-07-11 DIAGNOSIS — R25.2 MUSCLE CRAMPS: ICD-10-CM

## 2025-07-11 DIAGNOSIS — K21.9 GASTROESOPHAGEAL REFLUX DISEASE WITHOUT ESOPHAGITIS: ICD-10-CM

## 2025-07-11 DIAGNOSIS — E78.5 DYSLIPIDEMIA: Primary | ICD-10-CM

## 2025-07-11 DIAGNOSIS — E11.319 TYPE 2 DIABETES MELLITUS WITH RETINOPATHY, WITHOUT LONG-TERM CURRENT USE OF INSULIN, MACULAR EDEMA PRESENCE UNSPECIFIED, UNSPECIFIED LATERALITY, UNSPECIFIED RETINOPATHY SEVERITY (HCC): ICD-10-CM

## 2025-07-11 DIAGNOSIS — N28.9 RENAL INSUFFICIENCY, MILD: ICD-10-CM

## 2025-07-11 DIAGNOSIS — E11.3299 TYPE 2 DIABETES MELLITUS WITH MILD NONPROLIFERATIVE RETINOPATHY WITHOUT MACULAR EDEMA, WITHOUT LONG-TERM CURRENT USE OF INSULIN, UNSPECIFIED LATERALITY (HCC): ICD-10-CM

## 2025-07-11 DIAGNOSIS — J45.40 MODERATE PERSISTENT ASTHMA WITHOUT COMPLICATION: ICD-10-CM

## 2025-07-11 DIAGNOSIS — I10 PRIMARY HYPERTENSION: ICD-10-CM

## 2025-07-11 DIAGNOSIS — E78.5 DYSLIPIDEMIA: ICD-10-CM

## 2025-07-11 LAB
ALBUMIN SERPL-MCNC: 4.6 G/DL (ref 3.4–5)
ALBUMIN/GLOB SERPL: 1.7 (ref 0.8–1.7)
ALP SERPL-CCNC: 120 U/L (ref 45–117)
ALT SERPL-CCNC: 50 U/L (ref 10–50)
ANION GAP SERPL CALC-SCNC: 12 MMOL/L (ref 3–18)
APPEARANCE UR: CLEAR
AST SERPL-CCNC: 28 U/L (ref 10–38)
BACTERIA URNS QL MICRO: ABNORMAL /HPF
BASOPHILS # BLD: 0 K/UL (ref 0–0.1)
BASOPHILS NFR BLD: 0 % (ref 0–2)
BILIRUB SERPL-MCNC: 0.6 MG/DL (ref 0.2–1)
BILIRUB UR QL: NEGATIVE
BUN SERPL-MCNC: 15 MG/DL (ref 6–23)
BUN/CREAT SERPL: 13 (ref 12–20)
CALCIUM SERPL-MCNC: 10.3 MG/DL (ref 8.5–10.1)
CHLORIDE SERPL-SCNC: 96 MMOL/L (ref 98–107)
CHOLEST SERPL-MCNC: 126 MG/DL
CO2 SERPL-SCNC: 27 MMOL/L (ref 21–32)
COLOR UR: YELLOW
CREAT SERPL-MCNC: 1.14 MG/DL (ref 0.6–1.3)
CREAT UR-MCNC: 69.3 MG/DL (ref 30–125)
DIFFERENTIAL METHOD BLD: ABNORMAL
EOSINOPHIL # BLD: 0.09 K/UL (ref 0–0.4)
EOSINOPHIL NFR BLD: 2.2 % (ref 0–5)
EPITH CASTS URNS QL MICRO: ABNORMAL /LPF (ref 0–5)
ERYTHROCYTE [DISTWIDTH] IN BLOOD BY AUTOMATED COUNT: 12.2 % (ref 11.6–14.5)
EST. AVERAGE GLUCOSE BLD GHB EST-MCNC: 289 MG/DL
GLOBULIN SER CALC-MCNC: 2.7 G/DL (ref 2–4)
GLUCOSE SERPL-MCNC: 410 MG/DL (ref 74–108)
GLUCOSE UR STRIP.AUTO-MCNC: >1000 MG/DL
HBA1C MFR BLD: 11.7 % (ref 4.2–5.6)
HCT VFR BLD AUTO: 47 % (ref 36–48)
HDLC SERPL-MCNC: 35 MG/DL (ref 40–60)
HDLC SERPL: 3.6 (ref 0–5)
HGB BLD-MCNC: 15.4 G/DL (ref 13–16)
HGB UR QL STRIP: NEGATIVE
IMM GRANULOCYTES # BLD AUTO: 0.01 K/UL (ref 0–0.04)
IMM GRANULOCYTES NFR BLD AUTO: 0.2 % (ref 0–0.5)
KETONES UR QL STRIP.AUTO: NEGATIVE MG/DL
LDLC SERPL CALC-MCNC: 56 MG/DL (ref 0–100)
LEUKOCYTE ESTERASE UR QL STRIP.AUTO: NEGATIVE
LYMPHOCYTES # BLD: 1.44 K/UL (ref 0.9–3.6)
LYMPHOCYTES NFR BLD: 35.1 % (ref 21–52)
MAGNESIUM SERPL-MCNC: 2.2 MG/DL (ref 1.6–2.6)
MCH RBC QN AUTO: 28.1 PG (ref 24–34)
MCHC RBC AUTO-ENTMCNC: 32.8 G/DL (ref 31–37)
MCV RBC AUTO: 85.6 FL (ref 78–100)
MICROALBUMIN UR-MCNC: 1.75 MG/DL (ref 0–3)
MICROALBUMIN/CREAT UR-RTO: 25 MG/G (ref 0–30)
MONOCYTES # BLD: 0.34 K/UL (ref 0.05–1.2)
MONOCYTES NFR BLD: 8.3 % (ref 3–10)
NEUTS SEG # BLD: 2.22 K/UL (ref 1.8–8)
NEUTS SEG NFR BLD: 54.2 % (ref 40–73)
NITRITE UR QL STRIP.AUTO: NEGATIVE
NRBC # BLD: 0 K/UL (ref 0–0.01)
NRBC BLD-RTO: 0 PER 100 WBC
PH UR STRIP: 6 (ref 5–8)
PLATELET # BLD AUTO: 279 K/UL (ref 135–420)
PMV BLD AUTO: 10.2 FL (ref 9.2–11.8)
POTASSIUM SERPL-SCNC: 4.8 MMOL/L (ref 3.5–5.5)
PROT SERPL-MCNC: 7.3 G/DL (ref 6.4–8.2)
PROT UR STRIP-MCNC: NEGATIVE MG/DL
RBC # BLD AUTO: 5.49 M/UL (ref 4.35–5.65)
RBC #/AREA URNS HPF: NEGATIVE /HPF (ref 0–5)
SODIUM SERPL-SCNC: 134 MMOL/L (ref 136–145)
SP GR UR REFRACTOMETRY: >1.03 (ref 1–1.04)
TRIGL SERPL-MCNC: 177 MG/DL (ref 0–150)
TSH, 3RD GENERATION: 1.26 UIU/ML (ref 0.27–4.2)
UROBILINOGEN UR QL STRIP.AUTO: 0.2 EU/DL (ref 0.2–1)
VLDLC SERPL CALC-MCNC: 35 MG/DL
WBC # BLD AUTO: 4.1 K/UL (ref 4.6–13.2)
WBC URNS QL MICRO: NEGATIVE /HPF (ref 0–5)

## 2025-07-11 PROCEDURE — 3077F SYST BP >= 140 MM HG: CPT | Performed by: NURSE PRACTITIONER

## 2025-07-11 PROCEDURE — 81001 URINALYSIS AUTO W/SCOPE: CPT

## 2025-07-11 PROCEDURE — 82043 UR ALBUMIN QUANTITATIVE: CPT

## 2025-07-11 PROCEDURE — 83036 HEMOGLOBIN GLYCOSYLATED A1C: CPT

## 2025-07-11 PROCEDURE — 3046F HEMOGLOBIN A1C LEVEL >9.0%: CPT | Performed by: NURSE PRACTITIONER

## 2025-07-11 PROCEDURE — 85025 COMPLETE CBC W/AUTO DIFF WBC: CPT

## 2025-07-11 PROCEDURE — 3080F DIAST BP >= 90 MM HG: CPT | Performed by: NURSE PRACTITIONER

## 2025-07-11 PROCEDURE — 80053 COMPREHEN METABOLIC PANEL: CPT

## 2025-07-11 PROCEDURE — 83735 ASSAY OF MAGNESIUM: CPT

## 2025-07-11 PROCEDURE — 36415 COLL VENOUS BLD VENIPUNCTURE: CPT

## 2025-07-11 PROCEDURE — 80061 LIPID PANEL: CPT

## 2025-07-11 PROCEDURE — 84443 ASSAY THYROID STIM HORMONE: CPT

## 2025-07-11 PROCEDURE — 82570 ASSAY OF URINE CREATININE: CPT

## 2025-07-11 PROCEDURE — 99204 OFFICE O/P NEW MOD 45 MIN: CPT | Performed by: NURSE PRACTITIONER

## 2025-07-11 RX ORDER — LANCETS 28 GAUGE
1 EACH MISCELLANEOUS DAILY
Qty: 100 EACH | Refills: 3 | Status: SHIPPED | OUTPATIENT
Start: 2025-07-11

## 2025-07-11 RX ORDER — INSULIN GLARGINE 100 [IU]/ML
15 INJECTION, SOLUTION SUBCUTANEOUS NIGHTLY
Qty: 3 ML | Refills: 0 | Status: SHIPPED | OUTPATIENT
Start: 2025-07-11 | End: 2025-08-10

## 2025-07-11 RX ORDER — OMEPRAZOLE 20 MG/1
20 CAPSULE, DELAYED RELEASE ORAL DAILY
Qty: 90 CAPSULE | Refills: 2 | Status: SHIPPED | OUTPATIENT
Start: 2025-07-11

## 2025-07-11 RX ORDER — ALBUTEROL SULFATE 90 UG/1
INHALANT RESPIRATORY (INHALATION)
Qty: 18 G | Refills: 2 | Status: SHIPPED | OUTPATIENT
Start: 2025-07-11

## 2025-07-11 RX ORDER — BLOOD SUGAR DIAGNOSTIC
1 STRIP MISCELLANEOUS DAILY
Qty: 100 EACH | Refills: 3 | Status: SHIPPED | OUTPATIENT
Start: 2025-07-11

## 2025-07-11 SDOH — ECONOMIC STABILITY: FOOD INSECURITY: WITHIN THE PAST 12 MONTHS, THE FOOD YOU BOUGHT JUST DIDN'T LAST AND YOU DIDN'T HAVE MONEY TO GET MORE.: NEVER TRUE

## 2025-07-11 SDOH — ECONOMIC STABILITY: FOOD INSECURITY: WITHIN THE PAST 12 MONTHS, YOU WORRIED THAT YOUR FOOD WOULD RUN OUT BEFORE YOU GOT MONEY TO BUY MORE.: NEVER TRUE

## 2025-07-11 ASSESSMENT — PATIENT HEALTH QUESTIONNAIRE - PHQ9
2. FEELING DOWN, DEPRESSED OR HOPELESS: NOT AT ALL
SUM OF ALL RESPONSES TO PHQ QUESTIONS 1-9: 0
SUM OF ALL RESPONSES TO PHQ QUESTIONS 1-9: 0
1. LITTLE INTEREST OR PLEASURE IN DOING THINGS: NOT AT ALL
SUM OF ALL RESPONSES TO PHQ QUESTIONS 1-9: 0
SUM OF ALL RESPONSES TO PHQ QUESTIONS 1-9: 0

## 2025-07-12 NOTE — PROGRESS NOTES
Patient states he has taken medication today.    Luis Enrique Daivd presents today for   Chief Complaint   Patient presents with    Follow-up     Stays thirsty     Medication Refill    Urinary Frequency    Hand Problem     Cramps in both        Is someone accompanying this pt? No     Is the patient using any DME equipment during OV? No         7/11/2025     8:53 AM   PHQ-9    Little interest or pleasure in doing things 0   Feeling down, depressed, or hopeless 0   PHQ-2 Score 0   PHQ-9 Total Score 0            Health Maintenance reviewed and discussed and ordered per Provider.    Health Maintenance Due   Topic Date Due    Hepatitis B vaccine (1 of 3 - 19+ 3-dose series) Never done    Diabetic foot exam  01/17/2025    Diabetic Alb to Cr ratio (uACR) test  01/17/2025    Lipids  04/18/2025    GFR test (Diabetes, CKD 3-4, OR last GFR 15-59)  04/18/2025    A1C test (Diabetic or Prediabetic)  05/26/2025   .        \"Have you been to the ER, urgent care clinic since your last visit?  Hospitalized since your last visit?\"    No     “Have you seen or consulted any other health care providers outside our system since your last visit?”    No           
Received critical note on mobile device of glucose of 441. Answering service unable to get in touch with on call provider per note. This NP called patient who was alert and oriented and saw the result. Patient complains of thirst but denies other concerns. Trulicity requires prior authorization which will be completed on Monday. Therefore, Lantus insulin sent to patients pharmacy for him to  tomorrow morning. Advised patient to call 911 or go to ER if symptoms worsens. Pt voiced understanding and will  insulin tomorrow morning at local pharmacy.
urination and has not been monitoring his blood sugar levels due to a lack of test strips and lancets. He requests refills for these supplies. He acknowledges not adhering to a strict diabetic diet and consuming cookies, cakes, and ice cream, although he has been eating watermelon. He requests a refill for Ozempic and Trulicity. Additionally, he requests refills for albuterol, Voltaren, and Viagra. He recently had his metoprolol and lisinopril refilled and requests a refill for omeprazole. He has an appointment with his urologist next week and has been drinking a lot of water.    He reports experiencing cramping in his hands and legs for the past 3 to 4 months.    He saw his urologist yesterday and was informed that he has BPH and sugar in his urine.    Review of Systems   Constitutional:  Negative for chills, fatigue and fever.   HENT: Negative.     Respiratory:  Negative for chest tightness, shortness of breath and wheezing.    Cardiovascular:  Negative for chest pain, palpitations and leg swelling.   Gastrointestinal: Negative.  Negative for abdominal pain, blood in stool and diarrhea.   Endocrine: Positive for polydipsia and polyuria.   Genitourinary: Negative.  Negative for difficulty urinating and hematuria.   Musculoskeletal:  Positive for myalgias. Negative for back pain.   Neurological:  Negative for dizziness and light-headedness.   Psychiatric/Behavioral: Negative.  Negative for confusion, decreased concentration, dysphoric mood, self-injury, sleep disturbance and suicidal ideas. The patient is not nervous/anxious.           Objective   Blood pressure (!) 144/93, pulse 74, temperature 97.1 °F (36.2 °C), temperature source Temporal, resp. rate 16, height 1.753 m (5' 9\"), weight 92.9 kg (204 lb 12.8 oz), SpO2 95%.  Physical Exam  Neurological: Sensation is intact.  Cardiovascular: Pulses are normal.  Other: Blood pressure is elevated.   Foot exam: Equal pulses bilaterally-dorsalis pedis and tibial

## 2025-07-13 ENCOUNTER — TELEPHONE (OUTPATIENT)
Facility: CLINIC | Age: 55
End: 2025-07-13

## 2025-07-14 PROBLEM — R25.2 MUSCLE CRAMPS: Status: ACTIVE | Noted: 2025-07-14

## 2025-07-14 ASSESSMENT — ENCOUNTER SYMPTOMS
BACK PAIN: 0
GASTROINTESTINAL NEGATIVE: 1
DIARRHEA: 0
SHORTNESS OF BREATH: 0
CHEST TIGHTNESS: 0
ABDOMINAL PAIN: 0
BLOOD IN STOOL: 0
WHEEZING: 0

## 2025-07-15 DIAGNOSIS — G89.29 CHRONIC PAIN OF LEFT KNEE: Primary | ICD-10-CM

## 2025-07-15 DIAGNOSIS — M25.562 CHRONIC PAIN OF LEFT KNEE: Primary | ICD-10-CM

## 2025-07-15 NOTE — PROGRESS NOTES
Left knee xray ordered 11/2024 but patient has not completed yet. Will order ortho referral and advised patient to come in before next visit in August.

## 2025-07-22 ENCOUNTER — TELEPHONE (OUTPATIENT)
Facility: CLINIC | Age: 55
End: 2025-07-22

## 2025-07-22 NOTE — TELEPHONE ENCOUNTER
PA denied because letter states documentation not sent for type 2 diabetes but this provider sent the A1c results with PA. Will submit new PA.

## 2025-08-04 RX ORDER — INSULIN GLARGINE 100 [IU]/ML
15 INJECTION, SOLUTION SUBCUTANEOUS NIGHTLY
Qty: 3 ML | Refills: 0 | Status: SHIPPED | OUTPATIENT
Start: 2025-08-04 | End: 2025-09-03

## 2025-08-26 ENCOUNTER — TELEPHONE (OUTPATIENT)
Facility: CLINIC | Age: 55
End: 2025-08-26

## (undated) DEVICE — LIGHT HANDLE: Brand: DEVON

## (undated) DEVICE — BLADE SHV 3.5MM TOMCAT

## (undated) DEVICE — PADDING CAST SOF-ROL 6INX4YD --

## (undated) DEVICE — FLEX ADVANTAGE 3000CC: Brand: FLEX ADVANTAGE

## (undated) DEVICE — DISPOSABLE TOURNIQUET CUFF SINGLE BLADDER, SINGLE PORT AND QUICK CONNECT CONNECTOR: Brand: COLOR CUFF

## (undated) DEVICE — STOCKING ANTIEMB KNEE MED REG --

## (undated) DEVICE — (D)GLOVE SURG TRIFLX 8 PWD LTX -- DISC BY MFR USE ITEM 302994

## (undated) DEVICE — (D)STRIP SKN CLSR 0.5X4IN WHT --

## (undated) DEVICE — (D)GLOVE SURG 8.5 PWD LTX -- DISC BY MFR USE ITEM 110051

## (undated) DEVICE — 3M™ BAIR PAWS FLEX™ WARMING GOWN, STANDARD, 20 PER CASE 81003: Brand: BAIR PAWS™

## (undated) DEVICE — SOFT SILICONE HYDROCELLULAR SACRUM DRESSING WITH LOCK AWAY LAYER: Brand: ALLEVYN LIFE SACRUM (LARGE) PACK OF 10

## (undated) DEVICE — PREP SKN CHLRAPRP APPL 10.5ML --

## (undated) DEVICE — Z CONVERTED USE 2274618 CRUTCH ALU ADLT 5.2IN-5.10IN PREM

## (undated) DEVICE — KNEE ARTHROSCOPY III-LF: Brand: MEDLINE INDUSTRIES, INC.

## (undated) DEVICE — SUTURE MCRYL SZ 4-0 L18IN ABSRB UD L19MM PS-2 3/8 CIR PRIM Y496G

## (undated) DEVICE — MEDI-VAC NON-CONDUCTIVE SUCTION TUBING: Brand: CARDINAL HEALTH

## (undated) DEVICE — BANDAGE, HONEYCOMB ELAS 4IN: Brand: CARDINAL HEALTH

## (undated) DEVICE — [ARTHROSCOPY PUMP,  DO NOT USE IF PACKAGE IS DAMAGED,  KEEP DRY,  KEEP AWAY FROM SUNLIGHT,  PROTECT FROM HEAT AND RADIOACTIVE SOURCES.]: Brand: FLOSTEADY

## (undated) DEVICE — SOLUTION IRRIG 3000ML 0.9% SOD CHL FLX CONT 0797208] ICU MEDICAL INC]

## (undated) DEVICE — NEEDLE HYPO 18GA L1.5IN PNK S STL HUB POLYPR SHLD REG BVL

## (undated) DEVICE — KIT CLN UP BON SECOURS MARYV

## (undated) DEVICE — DRAPE,REIN 53X77,STERILE: Brand: MEDLINE

## (undated) DEVICE — 3M™ STERI-STRIP™ COMPOUND BENZOIN TINCTURE 40 BAGS/CARTON 4 CARTONS/CASE C1544: Brand: 3M™ STERI-STRIP™